# Patient Record
Sex: FEMALE | Race: BLACK OR AFRICAN AMERICAN | Employment: OTHER | URBAN - METROPOLITAN AREA
[De-identification: names, ages, dates, MRNs, and addresses within clinical notes are randomized per-mention and may not be internally consistent; named-entity substitution may affect disease eponyms.]

---

## 2017-01-13 NOTE — TELEPHONE ENCOUNTER
Forms received from [facility] for plan of care. Forms completed and faxed back. No further action needed.

## 2017-02-06 NOTE — TELEPHONE ENCOUNTER
Paulette from [address] [facility] pt is being discharged from [facility] this week as she is no longer home bound. Pt will need to have orders for outpatient Physical Therapy, pt had back surgery October 2016. Please advise.

## 2017-02-06 NOTE — TELEPHONE ENCOUNTER
Paulette from [facility] stts pt needs script to continue therapy. Pt would like script mailed to her home.  Please advise

## 2017-02-07 NOTE — TELEPHONE ENCOUNTER
Reviewed doctor's recommendations with Paulette from [address], she will contact the surgeon's office, no further questions at this time.

## 2017-02-16 NOTE — TELEPHONE ENCOUNTER
Rx request for Levocetirizine Dihydrochloride 5 mg, PASSED Allergy Protocol. Rx filled per protocol.      Refill Protocol Appointment Criteria  · Appointment scheduled in the past 12 months or in the next 3 months  Recent Visits       Provider Department Pr

## 2017-02-25 NOTE — TELEPHONE ENCOUNTER
Per Sartell, the Methocarbamol 750mg tab is not covered under pt's insurance. Pls call [phone] for a prior auth, pls let Sartell know when approved.

## 2017-03-01 NOTE — TELEPHONE ENCOUNTER
PA for Methocarbamol 750 mg tab completed with [employer] via CMM response time 3-5 business days [address] [facility]

## 2017-03-08 NOTE — PROGRESS NOTES
LUMBAR SPINE EVALUATION:   Referring Physician: Dr. Galdino Gay  Diagnosis: s/p lumbar fusion     Date of Service: 3/8/2017   Date of surgery: 10/20/16                       MD appt: 3/15/17    PATIENT SUMMARY   Mauricio Angeles is a 73year old y/o female lumbar ROM, decreased LE MMT, decreased left SLS. Pt currently has difficulty with bending, sitting, standing, sleeping, walking. Pt scored 59% limitation on FOTO.    Jayna Dia would benefit from skilled Physical Therapy to address the above impairments to im Therapeutic Exercises; Neuromuscular Re-education; Therapeutic Activity; Ultrasound;  Electrical Stim; Traction; Patient education: Home exercise program instruction;     Education or treatment limitation: None  Rehab Potential:good  FOTO: 41/100  Current s

## 2017-03-10 NOTE — PROGRESS NOTES
Diagnosis: s/p lumbar fusion L2-5  Authorized # of Visits:  2         Next MD visit: none scheduled  Fall Risk: standard         Precautions: n/a           Medication Changes since last visit?: No  5th visit FOTO:           D/C visit FOTO:   Subjective: Pt

## 2017-03-15 NOTE — PROGRESS NOTES
Diagnosis: s/p lumbar fusion L2-5  Authorized # of Visits: 3/8 (per POC)         Next MD visit: 3/31/17 Dr. Augie Cameron: standard         Precautions: n/a           Medication Changes since last visit?: No  5th visit FOTO:           D/C visit FOT

## 2017-03-17 NOTE — PROGRESS NOTES
Diagnosis: s/p lumbar fusion L2-5  DOS: 10/20/16  Authorized # of Visits: 4/8 (per POC)         Next MD visit: 3/31/17 Dr. Kory Soria: standard         Precautions: n/a           Medication Changes since last visit?: No  5th visit FOTO:

## 2017-03-21 NOTE — PROGRESS NOTES
Diagnosis: s/p lumbar fusion L2-5  DOS: 10/20/16  Authorized # of Visits: 5/8 (per POC)         Next MD visit: 3/31/17 Dr. Lorenzo Hall  Fall Risk: standard         Precautions: n/a           Medication Changes since last visit?: No  5th visit FOTO:  Status Treatment Time: 45 min  Goals       •  Therapy Goals          1. Pt will be I with HEP to improve therapy outcome.    2. Pt will be able to tolerate sitting >1 hour with LBP<2/10.   3. Pt will be able to tolerate sleeping > 4 hours without waking due to LBP

## 2017-03-23 NOTE — PROGRESS NOTES
Diagnosis: s/p lumbar fusion L2-5  DOS: 10/20/16  Authorized # of Visits: 6/8 (per POC)         Next MD visit: 3/31/17 Dr. Ines Workman  Fall Risk: standard         Precautions: n/a           Medication Changes since last visit?: No  5th visit FOTO:  Status sitting >1 hour with LBP<2/10. 3. Pt will be able to tolerate sleeping > 4 hours without waking due to LBP.    4.  Pt will be able to tolerate walking with no AD > 10 min with LBP<3/10.

## 2017-03-27 NOTE — PROGRESS NOTES
Diagnosis: s/p lumbar fusion L2-5  DOS: 10/20/16  Authorized # of Visits: 7/8 (per POC)         Next MD visit: 3/31/17 Dr. Kezia Tristan  Fall Risk: standard         Precautions: n/a           Medication Changes since last visit?: No  5th visit FOTO:  Status to improve therapy outcome.    2. Pt will be able to tolerate sitting >1 hour with LBP<2/10. 3. Pt will be able to tolerate sleeping > 4 hours without waking due to LBP.    4.  Pt will be able to tolerate walking with no AD > 10 min with LBP<3/10.

## 2017-03-29 NOTE — PROGRESS NOTES
Diagnosis: s/p lumbar fusion L2-5  DOS: 10/20/16  Authorized # of Visits: 8/8 (per POC)         Next MD visit: 3/31/17 Dr. Chano Bernstein  Fall Risk: standard         Precautions: n/a           Medication Changes since last visit?: No  5th visit FOTO:  Status shoulder flex, hooklying heel slides   3/15/17:  Added hooklying figure 4 stretch with gentle self OP     Assessment: Pt presents with limited lumbar ROM, improved LE strength, decreased balance on left leg. Pt has met 3/4 LTGs.  Pt will continue to benefi

## 2017-04-06 NOTE — PROGRESS NOTES
Diagnosis: s/p lumbar fusion L2-5  DOS: 10/20/16  Authorized # of Visits: 9/16 (per POC)         Next MD visit: 5/17/17 Dr. Soraya Richardson  Fall Risk: standard         Precautions: n/a           Medication Changes since last visit?: No  5th visit FOTO:  Status min  Goals       •  Therapy Goals          1. Pt will be I with HEP to improve therapy outcome.    2. Pt will be able to tolerate sitting >1 hour with LBP<2/10. 3. Pt will be able to tolerate sleeping > 4 hours without waking due to LBP.    4.  Pt will be

## 2017-04-11 NOTE — PROGRESS NOTES
Diagnosis: s/p lumbar fusion L2-5  DOS: 10/20/16  Authorized # of Visits: 10/16 (per POC)         Next MD visit: 5/17/17 Dr. Marcellus Szymanski: standard         Precautions: n/a           Medication Changes since last visit?: No  5th visit FOTO:  Statu and ROM, soft tissue mob for pain relief and tissue extensibility. Pt is able to demo proper squat lift. Initiated golfers lift techniques       Plan: progress as tolerated to improve functional mobility, progress standing exercises as able.   Begin body

## 2017-04-13 NOTE — PROGRESS NOTES
Diagnosis: s/p lumbar fusion L2-5  DOS: 10/20/16  Authorized # of Visits: 11/16 (per POC)         Next MD visit: 5/17/17 Dr. Ella Broussard: standard         Precautions: n/a           Medication Changes since last visit?: No  5th visit FOTO:  Statu Plan: progress as tolerated to improve functional mobility, progress standing exercises as able. Charges: TEx2, man mob 1       Total Timed Treatment: 44 min  Total Treatment Time: 45 min  Goals       •  Therapy Goals          1.  Pt will be I wi

## 2017-04-15 NOTE — TELEPHONE ENCOUNTER
Dr. Anitra Huerta pt requested order for mammogam. LOV w/you 12/5/2016 with no f/u appointments booked. Should pt make appointment with your before order? Order is pended, if you approve.

## 2017-04-18 NOTE — PROGRESS NOTES
Diagnosis: s/p lumbar fusion L2-5  DOS: 10/20/16  Authorized # of Visits: 12/16 (per POC)         Next MD visit: 5/17/17 Dr. Tesfaye Strickland: standard         Precautions: n/a           Medication Changes since last visit?: No  5th visit FOTO:  Statu mobility, progress standing exercises as able. Charges: TEx3      Total Timed Treatment: 44 min  Total Treatment Time: 45 min  Goals       •  Therapy Goals          1. Pt will be I with HEP to improve therapy outcome.    2.  Pt will be able to tolerate

## 2017-04-20 NOTE — PROGRESS NOTES
Diagnosis: s/p lumbar fusion L2-5  DOS: 10/20/16  Authorized # of Visits: 14/16 (per POC)         Next MD visit: 5/17/17 Dr. Cornelious Alpers: standard         Precautions: n/a           Medication Changes since last visit?: No  5th visit FOTO:  Statu paraspinals with pt in prone        HEP: 3/10/17-hooklying hip add squeeze, hooklying shoulder flex, hooklying heel slides   3/15/17:  Added hooklying figure 4 stretch with gentle self OP     Assessment: Pt reporting onset of LE tingling at night.   Nasra

## 2017-04-24 NOTE — TELEPHONE ENCOUNTER
Gynecology Medications; PLEASE ADVISE ON REFILLS. THANKS.   Protocol Criteria:  · Appointment scheduled in the past 12 months or the next 3 months  · Pap smear in the past 12 months  · Pap smear WNL manually verified  Recent Visits       Provider Department [facility], [facility]     In 4 days [facility], [address] [place] [address]     In 1 week [facility] [address]     In 1 week [place], [place] [address] [place]

## 2017-04-28 NOTE — PROGRESS NOTES
Diagnosis: s/p lumbar fusion L2-5  DOS: 10/20/16  Authorized # of Visits: 16/16 (per POC)         Next MD visit: 5/17/17 Dr. Perez Deaner: standard         Precautions: n/a           Medication Changes since last visit?: No  5th visit FOTO:  Statu PT to improve balance for gait for 3 scheduled visits     Charges: TEx1, NMREx2      Total Timed Treatment: 40 min  Total Treatment Time: 43 min

## 2017-05-01 NOTE — TELEPHONE ENCOUNTER
Pt verified she was able to  prescription. Pt asked if 30-day refills could be changed to 90-day refill. Noted pt has 3 refills remaining on Losartan-HCTZ, contacted pharmacy and changed to one 90-day refill.

## 2017-05-02 NOTE — PROGRESS NOTES
Diagnosis: s/p lumbar fusion L2-5  DOS: 10/20/16  Authorized # of Visits: 17/19 (per POC)         Next MD visit: 5/17/17 Dr. Kervin Herr: standard         Precautions: n/a           Medication Changes since last visit?: No  5th visit FOTO:  Statu Assessment: Pt with exacerbation of pain following ironing while seated. Instructed in body mechanics and corrected technique      Plan: progress as tolerated to improve functional mobility, progress standing exercises as able.       Charges: TEx3

## 2017-05-04 NOTE — PROGRESS NOTES
Diagnosis: s/p lumbar fusion L2-5  DOS: 10/20/16  Authorized # of Visits: 18/19 (per POC)         Next MD visit: 5/17/17 Dr. Carlos Kanner: standard         Precautions: n/a           Medication Changes since last visit?: No  5th visit FOTO:  Statu gentle self OP   5/4/17: Updated HEP    Assessment: Pt has good understanding of HEP and progression. Pt continues to have difficulty bridging and turning over in bed.       Plan: progress as tolerated to improve functional mobility, progress standing exer

## 2017-05-10 NOTE — PROGRESS NOTES
Diagnosis: s/p lumbar fusion L2-5  DOS: 10/20/16  Authorized # of Visits: 19/19 (per POC)         Next MD visit: 5/17/17 Dr. Nadia Swenson: standard         Precautions: n/a           Medication Changes since last visit?: No  5th visit FOTO:  Statu tolerate walking with no AD > 10 min with LBP<3/10.  GOAL ACHIEVED

## 2017-05-18 NOTE — PROGRESS NOTES
HPI:    Patient ID: Jermaine Perez is a 81year old female. Hypertension  This is a chronic problem. The current episode started more than 1 year ago. The problem is controlled.  Pertinent negatives include no chest pain, headaches, palpitations, periphe Tab Take 1 tablet by mouth once daily. Disp: 30 tablet Rfl: 3   DEBLITANE 0.35 MG Oral Tab TAKE ONE TABLET BY MOUTH EVERY DAY Disp: 84 tablet Rfl: 0   LANSOPRAZOLE 30 MG Oral Capsule Delayed Release TAKE 1 CAPSULE  BY MOUTH EVERY MORNING BEFORE BREAKFAST. Pulse: 71    Temp: 98.2 °F (36.8 °C)    TempSrc: Oral    Resp: 16    Height: 5' 6\" (1.676 m)    Weight: 176 lb (79.833 kg)          Body mass index is 28.42 kg/(m^2). PHYSICAL EXAM:   Physical Exam    Constitutional: No distress.    HENT:   Right Ea

## 2017-05-18 NOTE — PATIENT INSTRUCTIONS
Comply with medications. Pain management via prescription medications as needed. Recommend weight loss via daily exercising and consistent healthy dietary changes. Monitor blood pressures and record at home. Limit salt intake.   CBC order to reassess ane

## 2017-07-07 NOTE — TELEPHONE ENCOUNTER
May have been an old patient of mine at [facility], I cannot remember, was not there for very long, but she does not see me regularly, declined any prescriptions

## 2017-07-17 NOTE — PROGRESS NOTES
LOWER EXTREMITY EVALUATION:   Referring Physician: Dr. Laurent Jaime  Date of Onset: Feb. 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

## 2017-07-17 NOTE — PROGRESS NOTES
Patient is a 82-year-old female who presents today with left and right patella chondromalacia complaints left may be a little more symptomatic going up stairs can aggravate it.   She was treated some time ago she has not certain 1 with therapy and it did re arthritic change or femoral-tibial arthritis    Impression left and right knee patella chondromalacia    I discussed with her proper knee mechanics physical therapy was ordered if she can tolerate anti-inflammatories and approved through her primary care m

## 2017-07-20 NOTE — PROGRESS NOTES
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

## 2017-07-28 NOTE — TELEPHONE ENCOUNTER
Darryl Jorge needs a refill of:    •  AmLODIPine Besylate 5 MG Oral Tab, Take 1 tablet (5 mg total) by mouth daily. , Disp: 90 tablet, Rfl: 0

## 2017-07-31 NOTE — PROGRESS NOTES
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

## 2017-08-02 NOTE — PROGRESS NOTES
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 Discussed patient POC with the PT.     Charges: TX x 3       Total Timed Treatment: 45 min  Total Treatment Time: 47 min

## 2017-08-07 NOTE — PROGRESS NOTES
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 Discussed patient POC with the PT.     Charges: TX x 3       Total Timed Treatment: 45 min  Total Treatment Time: 47 min

## 2017-08-09 NOTE — PROGRESS NOTES
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 Progress Knee strength by one full grade so that patient may have sufficient strength to tolerate WB activities such as stairs without increased pain or deviation--IN PROGRESS  3.  Progress hip strength to 4+/5 or greater throughout so that pa

## 2017-08-14 NOTE — PROGRESS NOTES
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

## 2017-08-16 NOTE — PROGRESS NOTES
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 Thank you for your referral. If you have any questions, please contact me at Dept: [phone].     Sincerely,  Lorenzo Mention    Electronically signed

## 2017-08-16 NOTE — PROGRESS NOTES
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  All goals set at time of initial ev

## 2017-09-01 NOTE — TELEPHONE ENCOUNTER
LOV 5-18-17 refilled per protocol  Refill Protocol Appointment Criteria  · Appointment scheduled in the past 12 months or in the next 3 months  Recent Outpatient Visits            1 week ago S/P lumbar spinal fusion    [address] [place] [address]

## 2017-09-08 NOTE — TELEPHONE ENCOUNTER
Please advise on refill  Refill Protocol Appointment Criteria  · Appointment scheduled in the past 6 months or in the next 3 months  Recent Outpatient Visits            2 weeks ago S/P lumbar spinal fusion    [address]

## 2017-09-25 NOTE — TELEPHONE ENCOUNTER
Reason for Disposition  • Patient wants to be seen    Protocols used: FOOT PAIN-A-OH  Action Requested: Summary for Provider     []  Critical Lab, Recommendations Needed  [] Need Additional Advice  []   FYI    []   Need Orders  [] Need Medications Sent t

## 2017-10-02 NOTE — PATIENT INSTRUCTIONS
Continue with gabapentin. Consider follow up with neurosurgeon and an initial consult with neurology. EMG ordered.

## 2017-10-02 NOTE — PROGRESS NOTES
HPI:    Patient ID: Justen Steele is a 81year old female. Numbness   This is a chronic problem. The current episode started more than 1 year ago. The problem occurs intermittently. The problem has been waxing and waning.  Associated symptoms include nu mg total) rectally daily as needed. Disp: 10 suppository Rfl: 0   docusate sodium 100 MG Oral Cap Take 100 mg by mouth 2 (two) times daily. Disp: 10 capsule Rfl: 0   simvastatin 20 MG Oral Tab Take 20 mg by mouth nightly.  Disp:  Rfl:    Diclofenac Sodium (

## 2017-10-20 NOTE — PROCEDURES
[address]  Phone: [phone]  Fax: [phone]    ELECTRODIAGNOSTIC REPORT          Patient:  Prashant Lyles YOB: 1945  Patient ID: [id number] Hand Dominance: Sarah Conclusion: She relates bilateral toes feet anterior tibial uncomfortable sensation which predominant occurs at night. If she stands up symptoms resolved. He has had lumbar disc surgery fusion.     The [facility] conduction study reveals bilateral mild chr L.  GLUTEUS MED N None None None None 3-5 N N N   L. TIB ANTERIOR N None None None None 5-7 N N N   L. GASTROCN (MED) N None None None None N N N N   L. VAST LATERALIS N None None None None N N N N   L. TIB POSTERIOR N None None None None 3-5 N N N   TIB AN

## 2017-11-24 NOTE — TELEPHONE ENCOUNTER
Chart reviewed. Refills sent per Triage Dept protocol.      Refill Protocol Appointment Criteria  · Appointment scheduled in the past 6 months or in the next 3 months  Recent Outpatient Visits            1 month ago Lumbar radiculopathy    [facility]

## 2017-11-30 NOTE — TELEPHONE ENCOUNTER
Pt called in requesting a refill on medication below, please. Pt states she only has a few days left.     Current Outpatient Prescriptions:     •  LOSARTAN POTASSIUM-HCTZ 50-12.5 MG Oral Tab, TAKE ONE TABLET BY MOUTH ONE TIME DAILY , Disp: 90 tablet, Rfl:

## 2017-12-01 NOTE — TELEPHONE ENCOUNTER
Requesting Losartan-HCTZ refill    Failed IM/FM refill protocol, please advise    Hypertensive Medications  Protocol Criteria:  · Appointment scheduled in the past 6 months or in the next 3 months  · BMP or CMP in the past 12 months  · Creatinine result <

## 2017-12-22 NOTE — TELEPHONE ENCOUNTER
Pt calling to request refill request for GABAPENTIN 300 MG Oral Cap. .. please advise       Current Outpatient Prescriptions:  GABAPENTIN 300 MG Oral Cap TAKE ONE CAPSULE BY MOUTH TWICE DAILY  Disp: 60 capsule Rfl: 0

## 2017-12-22 NOTE — TELEPHONE ENCOUNTER
Please advise on behalf of Dr. Geoffrey Malik, pt is almost out of medication. She takes it daily since her surgery in October 2016. Pt called because she got a message that her medication was \"denied\". No documentation noted. Please advise.

## 2017-12-22 NOTE — TELEPHONE ENCOUNTER
Refill Protocol Appointment Criteria: Medication filled for 90 days per protocol.     · Appointment scheduled in the past 6 months or in the next 3 months  Recent Outpatient Visits            2 weeks ago S/P lumbar fusion    [address]

## 2018-02-14 NOTE — IMAGING NOTE
Pt returned to holding area 1253pm cookies and juice provided. Pt hob at 30 degrees denies headache .  Friend at pt side     1345 post instructions given verbal et written pt instructed complete bed rest with lying at 30 degree angle only activity to bathro

## 2018-02-22 NOTE — TELEPHONE ENCOUNTER
Patient dropped off disability parking placard to be completed and mailed to the [place], [place]. Form placed in [address]. Please contact pt once form has been mailed.

## 2018-02-28 NOTE — TELEPHONE ENCOUNTER
Pt calling regarding form. Pt is asking for a copy of the form for her records mailed to her home also. .please advise

## 2018-03-24 NOTE — TELEPHONE ENCOUNTER
Refill Protocol Appointment Criteria  · Appointment scheduled in the past 6 months or in the next 3 months  Recent Outpatient Visits            1 month ago S/P lumbar laminectomy    Rosy Renteria MD    Office Visi

## 2018-03-26 NOTE — TELEPHONE ENCOUNTER
Kalia Kc stopped in the [facility] office. She received notice from her insurance that the Methocarbamol is not on her list of covered drugs or is included on the drug list but subject to certain limits.  Does Dr. Nadiya Bennett want to send in a prior auth or does h

## 2018-03-26 NOTE — TELEPHONE ENCOUNTER
Maco Salazar is due for her annual mammogram next month. Can an order pls be entered? Yolis Rich know when done.

## 2018-03-26 NOTE — TELEPHONE ENCOUNTER
Patient informed that a prior authorization can be initiated per Dr. Burke Ignacio for Methocarbamol.    Patient states the methocarbamol was filled on 03/19/18 and she will contact pharmacy to have them send over prior authorization information one week before

## 2018-04-13 NOTE — TELEPHONE ENCOUNTER
Signed Prescriptions Disp Refills    lansoprazole 30 MG Oral Capsule Delayed Release 30 capsule 2      Sig: TAKE ONE CAPSULE BY MOUTH IN THE MORNING BEFORE BREAKFAST        Authorizing Provider: Sonya Velasquez        Ordering User: Sofi Garibay

## 2018-04-17 NOTE — PATIENT INSTRUCTIONS
Medication reviewed and renewed where needed and appropriate. Comply with medications. Monitor blood pressures and record at home. Limit salt intake. Recommend weight loss via daily exercising and consistent healthy dietary changes. Hep C screen done.

## 2018-04-17 NOTE — PROGRESS NOTES
HPI:    Patient ID: Rosa M Villegas is a 81year old female. Needs shingrix as well as Hep C screen. Hypertension   This is a chronic problem. The current episode started more than 1 year ago. The problem is unchanged. The problem is controlled.  Per FLUTICASONE PROPIONATE 50 MCG/ACT Nasal Suspension spray 2 sprays in each nostril daily Disp: 16 g Rfl: 1   LOSARTAN POTASSIUM-HCTZ 50-12.5 MG Oral Tab TAKE ONE TABLET BY MOUTH ONE TIME DAILY  Disp: 90 tablet Rfl: 0   Clindamycin Phosphate 1 % External L present. Pulmonary/Chest: Effort normal and breath sounds normal. No respiratory distress. Neurological: She is alert and oriented to person, place, and time. ASSESSMENT/PLAN:   1.  Essential hypertension  To goal  - AmLODIPine Besylate 5 MG

## 2018-04-30 NOTE — TELEPHONE ENCOUNTER
Pt called in requesting to file an appeal for the following medication to be approved:      Current Outpatient Prescriptions:     •  METHOCARBAMOL 750 MG Oral Tab, TAKE ONE TABLET BY MOUTH TWICE DAILY , Disp: 60 tablet, Rfl: 1

## 2018-05-03 NOTE — TELEPHONE ENCOUNTER
PA for Methocarbamol 750 mg tab completed with [employer] via CMM response time 24-72 hours KEY [id number].

## 2018-05-23 NOTE — LETTER
[address]   Date:   6/24/2019     Name:   Justen Steele    YOB: 1945   MRN:   [id number]       WHERE IS YOUR PAIN NOW? Ernie the areas on your body where you feel the described sensations.   Use the appropriate sym
no

## 2018-06-06 NOTE — TELEPHONE ENCOUNTER
Failed per nursing protocol - please advise on refill request       Hypertensive Medications  Protocol Criteria:  · Appointment scheduled in the past 6 months or in the next 3 months  · BMP or CMP in the past 12 months  · Creatinine result < 2  Recent Out

## 2018-07-17 NOTE — TELEPHONE ENCOUNTER
Refill Protocol Appointment Criteria  · Appointment scheduled in the past 6 months or in the next 3 months  Recent Outpatient Visits            3 months ago Need for hepatitis C screening test    [facility] [place], [facility], [address], [address] Frieda Plummer,

## 2018-08-25 NOTE — TELEPHONE ENCOUNTER
Failed per nursing protocol - please advise on refill request       Gynecology Medications  Protocol Criteria:  · Appointment scheduled in the past 12 months or the next 3 months  · Pap smear in the past 12 months  · Pap smear WNL manually verified  Recen

## 2018-09-04 NOTE — TELEPHONE ENCOUNTER
Hypertensive Medications  Protocol Criteria:  · Appointment scheduled in the past 6 months or in the next 3 months  · BMP or CMP in the past 12 months  · Creatinine result < 2  Recent Outpatient Visits            2 weeks ago S/P lumbar spinal fusion    D Sig: TAKE ONE TABLET BY MOUTH ONE TIME DAILY            LOV 4-17-18  LR 6-6-18 # 90    Unable to refill per protocol. pls advise, thanks.

## 2018-09-07 NOTE — PATIENT INSTRUCTIONS
Take the gabapentin 300mg capsules 3 times per day. When you run out you may request that either I or Dr. Daxa Malik refill the medication.  In either case let the provider know that the dose has been changed to three times per day so we can give you the appr office 48-72 hours to fill the prescription as our physicians rotate between multiple offices and procedure days in the hospitals. · Patient must present photo ID at time of .  If a designated family member will be picking up prescription, office mu

## 2018-09-07 NOTE — H&P
[facility] [address], [address], [address], [facility]    History and Physical    La Verkin Friend Patient Status:  No patient class for patient encounter    1945 MRN [id number]   [facility], symptoms. Alleviating factors: medications have been helping   Incontinence:  Denies b/b incontinence     Previous diagnostic tests: X-ray lumbar spine 8/17/18  Previous treatments:  Medications: Ibuprofen, methocarbamol. L2-L5 lumbar fusion in 2016.   C Musculoskeletal: Positive for back pain and gait problem. Skin: Negative for color change and rash. Neurological: Positive for numbness. Negative for weakness. Physical Exam:   Vital Signs:  Blood pressure 112/74, pulse 72, resp.  rate 16, heigh to 300mg TID. Methocarbamol refilled. Follow up in 6 weeks. If pain persists may need to consider caudal JACQUELINE.     Jessie Barrientos,   9/7/2018

## 2018-09-10 NOTE — TELEPHONE ENCOUNTER
Refill Protocol Appointment Criteria  · Appointment scheduled in the past 12 months or in the next 3 months  Recent Outpatient Visits            3 days ago Lumbar radiculopathy    LAILA Disla, [address], [address], [place]

## 2018-09-21 NOTE — PROGRESS NOTES
LUMBAR SPINE EVALUATION:   Referring Physician: Dr. Karen Drummond  Date of Onset: May 2018 Date of Service: 9/21/2018   Diagnosis: L5 radiculopathy  PATIENT SUMMARY:   Sabas Jack is a 72year old y/o female who presents to therapy today with complaints of syndrome L/S L, decreased lumbopelvic rotation.    ROM:     Lumbar         Pain (+/-)   Flexion WNL tips to ankles L/S flat Pain-free   Extension Limited 25%  Pain-free   R Sidebend WNL    L Sidebend Limited 25% L SIJ pain   R Rotation WNL    L Rotation Gamez improvement. Frequency / Duration: Patient will be seen for 2x/week or a total of 12 visits over a 90 day period.   Treatment will include: Manual Therapy, Neuromuscular Re-education, Therapeutic Activities, Therapeutic Exercise and Home Exercise Progra

## 2018-09-24 NOTE — PROGRESS NOTES
Diagnosis: L5 radiculopathy  Authorized # of Visits:  12         Next MD visit: none scheduled  Fall Risk: standard         Precautions: n/a           Medication Changes since last visit?: No  Subjective: Reports she felt great after last session and only mobility and continue core stabilization to improve body mechanics.      Charges: MT 1, EX 2       Total Timed Treatment: MT 15 min, EX 25 min  Total Treatment Time: 43 min

## 2018-09-25 PROBLEM — E78.00 HIGH CHOLESTEROL: Status: ACTIVE | Noted: 2018-09-25

## 2018-09-25 PROBLEM — E66.9 OBESITY (BMI 30-39.9): Status: ACTIVE | Noted: 2018-09-25

## 2018-09-25 PROBLEM — M19.90 OSTEOARTHRITIS: Status: ACTIVE | Noted: 2018-09-25

## 2018-09-25 NOTE — PROGRESS NOTES
[facility] Consultation Note       Date of Consult:  2018    Patient:  Vj Muir  :      1945  MRN:      [id number]    Referring Provider: Dr. Mike Aguilera       Chief Complaint:  Patient presents with:  Consult: non surgi TAKE ONE TABLET BY MOUTH ONE TIME DAILY  Disp: 90 tablet Rfl: 0   SHAROBEL 0.35 MG Oral Tab TAKE ONE TABLET BY MOUTH ONE TIME DAILY  Disp: 84 tablet Rfl: 0   ibuprofen 200 MG Oral Tab Take 200 mg by mouth every 6 (six) hours as needed for Pain.  Disp:  Rfl: - non-medical: Not on file    Occupational History      Occupation:         Comment: retired    Tobacco Use      Smoking status: Former Smoker        Packs/day: 0.00        Years: 15.00        Pack years: 0        Start date: 12/26/2017      Smoke oats, 2 % milk, one packet of oatmeal, sausage, eggs, toast. Black tea Fruit, yogurt, candy Skips, yogurt, fruit, chips,  Ice cream sandwich, popcorn Sausages, veggies, roll   +sweet tooth  Popcorn    Soda Drinker?: No  If yes, how much?:      Number of re and has remained in relatively good control. HYPERCHOLESTEROLEMIA:  The patient states that her cholesterol has been well controlled on her current medication.     Lab Results   Component Value Date/Time    CHOLEST 198 06/02/2016 12:30 PM     (H)

## 2018-09-28 NOTE — PROGRESS NOTES
Diagnosis: L5 radiculopathy  Authorized # of Visits:  12         Next MD visit: none scheduled  Fall Risk: standard         Precautions: n/a           Medication Changes since last visit?: No  Subjective: 1/10 LBP since last sessions, but feels a little st able to tolerate up to 1 hr standing activities with max 3/10 pain to be able to perform chores at home. (PROGRESSING)  Pt will be able to improve FOTO score by 10 pts to demonstrate functional improvement.      Plan: Continue manual therapy to restore SI m

## 2018-10-01 NOTE — PROGRESS NOTES
Diagnosis: L5 radiculopathy  Authorized # of Visits:  12         Next MD visit: none scheduled  Fall Risk: standard         Precautions: n/a           Medication Changes since last visit?: No  Subjective: Reports LLE pain-free since last session and is wal prone hip ext bilat      Assessment: No longer experiencing adverse neural tension of L sciatic N. Less stiffness into L hip extension with improved glut max activation. TA and multifidi strength still lacking making it difficult to control trunk mobility.

## 2018-10-05 NOTE — PROGRESS NOTES
Diagnosis: L5 radiculopathy  Authorized # of Visits:  12         Next MD visit: none scheduled  Fall Risk: standard         Precautions: n/a           Medication Changes since last visit?: No  Subjective: Reports she has been relatively pain-free since las hold 5 sec with PPT  -Standing hip flexor stretch L stance x2 min   -prone hip ext EOB 2x10 hold 3 sec  -Standing hip abd YTB 3x10 bilat  -Frog position PPT x10 hold 10 sec  -TG DL squat L6 hip dissociation 6 min.  -Sweeping and vacuuming mechanics instruc

## 2018-10-10 NOTE — TELEPHONE ENCOUNTER
Medication request: Methocarbamol. 750mg. Take 1 tablet BID. #60.  No refills    LOV-9/7/2018  NOV-10/19/2018    /Last refill:9/7/2018

## 2018-10-11 NOTE — TELEPHONE ENCOUNTER
Please call patient and verify the frequency of the medication, per latest medication recor (listed as historical) patient is taking it three times a day, previous record that was discontinued was twice a day, once verified, needs to route it to the [facility]

## 2018-10-11 NOTE — TELEPHONE ENCOUNTER
Please call patient and verify the frequency of the medication, per latest medication record, listed as historical, patient is taking it three times a day, previous record that was discontinued was twice a day, once verified, needs to route it to the PCP f

## 2018-10-12 NOTE — PROGRESS NOTES
Diagnosis: L5 radiculopathy  Authorized # of Visits:  12         Next MD visit: none scheduled  Fall Risk: standard         Precautions: n/a           Medication Changes since last visit?: No  Subjective: Tried sweeping at home and was able adjust and avoi hip ext EOB 2x10 hold 3 sec EX  -quadruped rocking x20  -TG DL squat L6 hip dissociation 6 min.  -Standing hip flexor stretch L stance x2 min   -prone hip ext EOB 2x10 hold 3 sec  -S/L clams with TA brace bilat hold 5 sec x10  -Frog position PPT x20 hold 5 along TL junction)  Pt will be able to improve FOTO score by 10 pts to demonstrate functional improvement.      Plan: Updated HEP with progression towards discharge next week secondary to progress made  Charges: MT 1, EX 2       Total Timed Treatment: MT 15

## 2018-10-13 NOTE — TELEPHONE ENCOUNTER
Pt states Dr. Momo aSwyer told her he would take over ordering this, she asked pharmacy to send rx to him, she prefers he orders it. She is taking it three times a day. She will reach out to pharmacy and Dr. Momo Sawyer.   Message sent to pharmacy with denial.

## 2018-10-15 NOTE — PROGRESS NOTES
Diagnosis: L5 radiculopathy  Authorized # of Visits:  12         Next MD visit: none scheduled  Fall Risk: standard         Precautions: n/a           Medication Changes since last visit?: No  Subjective: Did a couple loads of laundry over the weekend and min.  -Standing hip flexor stretch L stance.    -EOB hip flexor stretching x2 min bilat  -prone hip ext EOB 2x10 hold 3 sec EX  -quadruped rocking x20  -TG DL squat L6 hip dissociation 6 min.  -Standing hip flexor stretch L stance x2 min   -prone hip ext EO any adverse effects. Plan on progression of HEP in 2 visits followed by discharge secondary to progress made.      Goals: 6 wks  Pt will be able to walk up to 30 min max 3/10 pain to be able to go grocery shopping. (MET)  Be able to sleep through the night

## 2018-10-16 NOTE — TELEPHONE ENCOUNTER
Please review; protocol failed.   Hypertensive Medications  Protocol Criteria:  · Appointment scheduled in the past 6 months or in the next 3 months  · BMP or CMP in the past 12 months  · Creatinine result < 2  Recent Outpatient Visits            Yesterday

## 2018-10-19 NOTE — PROGRESS NOTES
HPI:    Patient ID: Isai Shore is a 72year old female. 72year old female with h/o L2-5 fusion with left radicular lower back pain presents for follow up. Has been participating in physical therapy. Reports 75% relief of radicular symptoms.   Takes g Rfl: 0   Clindamycin Phosphate 1 % External Lotion Apply 1 Application topically 2 (two) times daily. Disp: 60 mL Rfl: 11   ASPIRIN EC LOW DOSE 81 MG Oral Tab EC TAKE 1 TABLET BY MOUTH DAILY.  Disp: 30 tablet Rfl: 3   bisacodyl 10 MG Rectal Suppos Place 1 needed. She may see me on an as-needed basis.     Carson Vogel DO  Physical Medicine and [address] [place] [address]

## 2018-10-19 NOTE — PATIENT INSTRUCTIONS
If the pain stays in the same area and worsens after you have completed physical therapy you may give my office a phone call and I can certainly prescribe you some more therapy.  If the symptoms are in a different area or are different in a manner that is c

## 2018-10-25 NOTE — PROGRESS NOTES
Diagnosis: L5 radiculopathy  Authorized # of Visits:  12         Next MD visit: none scheduled  Fall Risk: standard         Precautions: n/a           Medication Changes since last visit?: No  Subjective: Reports she is almost 100% better and has been comp squeezes x15 hold 5 sec  -S/L clams with TA brace bilat hold 5 sec x15 EX  -TG DL squat L6 hip dissociation 6 min.  -Standing hip flexor stretch L stance.    -EOB hip flexor stretching x2 min bilat  -prone hip ext EOB 2x10 hold 3 sec EX  -quadruped rocking piriformis stretching 3x30 sec bilat  -BKFO with PPT x5 bilat alternating (4 min)  -STS Training 3x5 with 2# MB reach  -Standing hip abd YTB 3x10 bilat  -Standing hip ext YTB 3x10 bilat   -Multifidi Reaches RTB 3x10 bilat    HEP - EOB hip flexor stretching

## 2018-11-13 RX ORDER — METHOCARBAMOL 750 MG/1
TABLET, FILM COATED ORAL
Qty: 60 TABLET | Refills: 0 | Status: SHIPPED | OUTPATIENT
Start: 2018-11-13 | End: 2018-12-18

## 2018-11-13 NOTE — TELEPHONE ENCOUNTER
Refill request for methocarbamol 750 mg, BID, #60, no refills    LOV: 10/19/18  NOV: none  Last refilled on 10/10/18

## 2018-11-13 NOTE — TELEPHONE ENCOUNTER
Pharmacy sent us a refill order for pt Methocarbamol    I reviewed last progress note from Dr Russell Azar    I called pt to confirm she is still taking the Methocarbamol 750 mg Takes one twice daily.  Pt confirmed still taking this Rx  I will refill this Rx one time only and route this note to Dr Russell Azar as Cornelio Colbert

## 2018-11-19 NOTE — TELEPHONE ENCOUNTER
Refill request for gabapentin 300 mg, TID, #90, no refills    LOV: 10/19/18  NOV: none  Last refilled on 10/16/18

## 2018-11-21 NOTE — TELEPHONE ENCOUNTER
Received [facility] Rx Health Guide for prescribing narcotic and methocarbamal and nortriptyline  Left on Dr Konrad echeverria for review and routed    No response needed.

## 2018-11-29 NOTE — TELEPHONE ENCOUNTER
RX declined as patient does not seen any pcp at our office. Note sent to pharmacy to contact patients pcp office.

## 2018-12-03 NOTE — TELEPHONE ENCOUNTER
Hypertensive Medications  Protocol Criteria:  · Appointment scheduled in the past 6 months or in the next 3 months  · BMP or CMP in the past 12 months  · Creatinine result < 2  Recent Outpatient Visits            1 month ago     [address] advise. Thank you.         Hypertensive Medications Protocol [id number] 8:21 AM   CMP or BMP in past 12 months    Appointment in past 6 or next 3 months

## 2018-12-03 NOTE — TELEPHONE ENCOUNTER
Refill passed per Oralee Snantonietta protocol.     Refill Protocol Appointment Criteria  · Appointment scheduled in the past 6 months or in the next 3 months  Recent Outpatient Visits            1 month ago     [id number] [facility],

## 2018-12-05 NOTE — TELEPHONE ENCOUNTER
Pt is calling because she was told by Dorian Ospina that the pneumonia vaccine comes in two series. Pt would like to know if she has had both injections.  Pt states that she seeing a different provider at the time but had all of her records sent to Dr. Ashley Dash

## 2018-12-06 RX ORDER — LANSOPRAZOLE 30 MG/1
CAPSULE, DELAYED RELEASE ORAL
Qty: 90 CAPSULE | Refills: 0 | Status: SHIPPED | OUTPATIENT
Start: 2018-12-06 | End: 2019-04-01

## 2018-12-10 NOTE — PROGRESS NOTES
[address], [address]  Dept: [phone]       Patient:  Kylah Joe  :      1945  MRN:      [id number]    Chief Complaint:  Patient presents wit Oral Tab TAKE ONE TABLET BY MOUTH ONE TIME DAILY  Disp: 90 tablet Rfl: 0   SHAROBEL 0.35 MG Oral Tab TAKE ONE TABLET BY MOUTH ONE TIME DAILY  Disp: 84 tablet Rfl: 0   ibuprofen 200 MG Oral Tab Take 200 mg by mouth every 6 (six) hours as needed for Pain.  Sean Flower Tobacco comment: starting to quit. 4/22/17 is the quiting date    Substance and Sexual Activity      Alcohol use:  Yes        Alcohol/week: 0.0 oz        Comment: occasionally      Drug use: No      Sexual activity: Not on file    Other Topics      Cathleen 20  · # of snacks per day: 1 Type of snacks:  fruit  · Amount of soda consumption per day:    · Amount of water (in ounces) per day:  64  · Drinking between meals only:  yes  · Toughest challenge:  exercise    Nutritional Goals  Limit carbohydrates to 100 30-39. 9)    PLAN     Patient is not interested in bariatric surgery. Patient desires to pursue traditional weight loss at this time. HYPERTENSION: Blood pressure stable on the above medications. No interval change in antihypertensive medication.      D

## 2018-12-12 NOTE — TELEPHONE ENCOUNTER
[facility], scanned documents in media, and care everywhere. Do not see immunization records from another facility. Called the patient to see where she had the pneumonia vaccines.      Patient cannot recall which provider she was

## 2018-12-14 RX ORDER — METHOCARBAMOL 750 MG/1
TABLET, FILM COATED ORAL
Qty: 60 TABLET | Refills: 0 | Status: CANCELLED | OUTPATIENT
Start: 2018-12-14

## 2018-12-17 NOTE — TELEPHONE ENCOUNTER
Refill request for methocarbamol 175 mg, BID, #60, no refills    LOV: 10/19/18  NOV: none  Last refilled on 11/13/18

## 2018-12-18 NOTE — TELEPHONE ENCOUNTER
OK to refill this time, but if she continues to require these medications I'd like to see her in a month or two for follow up.

## 2018-12-18 NOTE — TELEPHONE ENCOUNTER
Medication request: Gabapentin 300 mg Oral Cap, #90, no refill  Take one capsule by mouth three times daily. [place]: 11/19/18    Medication request: Methocarbamol 750 MG Oral Tab, #60, no refill  Take one tablet by mouth twice daily.      ILP

## 2018-12-18 NOTE — TELEPHONE ENCOUNTER
Spoke with pt and informed her that Dr. Verna Maria has filled the medications requested this time, but if she continues to need the medications for pain management, she needs to schedule an appointment in the next month or two for a follow up.  Pt verbalized u

## 2019-01-22 NOTE — TELEPHONE ENCOUNTER
Please review; protocol failed.     Hypertensive Medications  Protocol Criteria:  · Appointment scheduled in the past 6 months or in the next 3 months  · BMP or CMP in the past 12 months  · Creatinine result < 2  Recent Outpatient Visits            1 month

## 2019-02-06 NOTE — TELEPHONE ENCOUNTER
Medication request: Methocarbamol 750mg 1 Tab PO BID    [place] 12/18/18 #60 r-0    Medication request: Gabapentin 300mg 1 Cap PO TID    [place]: 12/18/18 #90 r-0    LOV: 10/19/18  No follow up scheduled

## 2019-03-04 PROBLEM — R63.5 WEIGHT GAIN: Status: ACTIVE | Noted: 2019-03-04

## 2019-03-04 PROBLEM — R63.2 INCREASED APPETITE: Status: ACTIVE | Noted: 2019-03-04

## 2019-03-04 NOTE — PROGRESS NOTES
[address], [address]  Dept: [phone]       Patient:  Vj Friend  :      1945  MRN:      [id number]    Chief Complaint:  Patient presents wit IN THE MORNING BEFOR BREAKFAST Disp: 30 capsule Rfl: 0   lansoprazole 30 MG Oral Capsule Delayed Release TAKE ONE CAPSULE BY MOUTH IN THE MORNING BEFOR BREAKFAST Disp: 90 capsule Rfl: 0   LOSARTAN POTASSIUM-HCTZ 50-12.5 MG Oral Tab TAKE ONE TABLET BY MOUTH Years: 15.00        Pack years: 0        Start date: 12/26/2017      Smokeless tobacco: Never Used      Tobacco comment: starting to quit. 4/22/17 is the quiting date    Substance and Sexual Activity      Alcohol use:  Yes        Alcohol/week: 0.0 oz microdiscectomy   • TOTAL ABDOMINAL HYSTERECTOMY  1992    w/ BSO   • TUBAL LIGATION       Family History:    Family History   Problem Relation Age of Onset   • Diabetes Father    • Hypertension Mother    • Lipids Mother    • Heart Disorder Mother    • Canc appearance: alert, cooperative and mildly obese  Head: Normocephalic, without obvious abnormality, atraumatic  Eyes: conjunctivae/corneas clear. PERRL, EOM's intact. Fundi benign.   Lungs: clear to auscultation bilaterally  Heart: S1, S2 normal, no murmur, VITAMIN B12; Future  -     VITAMIN D, 25-HYDROXY; Future  -     HEMOGLOBIN A1C; Future  -     LEPTIN, SERUM; Future  -     EKG 12-LEAD;  Future    Obesity (BMI 30-39.9)  -     VITAMIN B12; Future  -     VITAMIN D, 25-HYDROXY; Future  -     HEMOGLOBIN A1C; F

## 2019-03-04 NOTE — PATIENT INSTRUCTIONS
I or my staff will contact you with results of the Xray. Start the steroid pack for pain. If the pain persists may consider physical therapy, either land-based or in the pool.

## 2019-03-04 NOTE — TELEPHONE ENCOUNTER
Pt req an order for needed labs before her Annual wellness check on 5/13 and Pt is also req an order for a mammogram, please call when order has been submitted.

## 2019-03-04 NOTE — PROGRESS NOTES
HPI:    Patient ID: Idalia Taylor is a 72year old female. She has a history of lumbosacral fusion, presents today with new onset right lateral hip and groin pain that started approximately 2 weeks ago with no inciting event.   Symptoms are described as Fluticasone Propionate 50 MCG/ACT Nasal Suspension spray 2 sprays in each nostril daily Disp: 16 g Rfl: 2   ibuprofen 200 MG Oral Tab Take 200 mg by mouth every 6 (six) hours as needed for Pain.  Disp:  Rfl:    acyclovir 5 % External Ointment Apply 1 g to from intra-articular hip pathology, trochanteric bursitis or both. Recommend medrol dosepak, XR of the right hip. If symptoms persist consider PT either land-based or aquatic therapy.     Kelly Espinosa DO  Physical Medicine and Rehabilitation  [facility]

## 2019-03-05 NOTE — TELEPHONE ENCOUNTER
Pt informed of order on chart. Agrees to make a appointment for next month. No further action needed.

## 2019-03-05 NOTE — TELEPHONE ENCOUNTER
Please advise on refill request.     Hypertensive Medications  Protocol Criteria:  · Appointment scheduled in the past 6 months or in the next 3 months  · BMP or CMP in the past 12 months  · Creatinine result < 2  Recent Outpatient Visits            Kayley Lobo

## 2019-03-06 NOTE — TELEPHONE ENCOUNTER
DO CAMILA Batista Nurse             Please let the patient know there is very little arthritis in the hip. She should continue with the medrol dosepak, call the clinic or send a message through [address] with a status update next week.

## 2019-03-11 NOTE — TELEPHONE ENCOUNTER
[facility] [employer] sent form regarding elderly patient taking Methocarbamol. Form put in Dr Jada Higgins for review.

## 2019-03-12 NOTE — TELEPHONE ENCOUNTER
Requested Prescriptions     Refused Prescriptions Disp Refills   • SHAROBEL 0.35 MG Oral Tab [Pharmacy Med Name: Sharobel 0.35 Mg Tab Nort] 84 tablet 0     Sig: TAKE ONE TABLET BY MOUTH ONE TIME DAILY     Refused By: Kaci Martinez     Reason for R

## 2019-03-13 NOTE — TELEPHONE ENCOUNTER
Pt. informed insurance was verified and MRI right hip wo cpt [id number] is a covered benefit and does not require authorization. She will call in the am to schedule appt.

## 2019-03-13 NOTE — TELEPHONE ENCOUNTER
Spoke with pt still with right groin pain 6/10, Medrol dose pack completed this Monday it help some but the pain still there, walking and moving pain is worsen. Takes Advil but doesn't help. Please advice?

## 2019-03-14 NOTE — TELEPHONE ENCOUNTER
Refill request for gabapentin 300 mg, TID, #90, no refills    LOV: 3/4/19  NOV: none  Last refilled on 2/6/19

## 2019-03-22 NOTE — TELEPHONE ENCOUNTER
----- Message from Miles Steen DO sent at 3/22/2019  9:02 AM CDT -----  Please let the patient know there is a small tear of one of the tendons of the hip, and a mild amount of arthritis seen on the MRI of the hip.  She should follow up with me about nikko

## 2019-03-22 NOTE — TELEPHONE ENCOUNTER
S/w patient and relayed the results of her Right hip MRI that was completed 3/21/19. Pt was informed that she has a small tear in one of the tendons and that there is a mild amount of arthritis.  Pt was instructed to begin PT (starting 4/1/19) and f/u half

## 2019-04-01 NOTE — PROGRESS NOTES
LUMBAR SPINE EVALUATION:   Referring Physician: Dr. Karen Drummond  Date of Onset: Mid Feb 2019 Date of Service: 4/1/2019   Diagnosis: Right groin pain (R10.31)  Spinal stenosis of lumbar region with neurogenic claudication (I99.560)  PATIENT SUMMARY:   Hiram Cabezas Total abdominal hysterectomy  1992    w/ BSO   • Tubal ligation     L2-5 Fusion    Medications reviewed in chart with pt.         ASSESSMENT:   Pt presents with R SIJ dysfunction with underlying extension rotation syndrome of lumbar spine secondary to core instructed in and issued a HEP for quadruped rocking for hip mobility, BKFO with TA bracing, SLS hip isometrics 4x30 sec bilat  Manual therapy: Long axis traction SIJ manip R, R femoral long axis traction manipulation, R L5-S1 gapping SB technique  Tx resp furnished under this plan of treatment and while under my care.     X______________________________________________ Date________________  Certification From: 2/7/2428      To: 6/30/2019

## 2019-04-10 NOTE — TELEPHONE ENCOUNTER
Gabapentin 300mg. Take 1 capsule TID. #90. No refills.     Last filled-3/15/2019    LOV-3/4/2019  NOV-4/22/2019

## 2019-04-11 NOTE — PROGRESS NOTES
Diagnosis: Right groin pain (R10.31)  Spinal stenosis of lumbar region with neurogenic claudication (S51.303)  Authorized # of Visits:  12 (MEDICARE PART A&B)         Next MD visit: none scheduled  Fall Risk: standard         Precautions: L2-5 fusion  Medi

## 2019-04-15 NOTE — PROGRESS NOTES
Diagnosis: Right groin pain (R10.31)  Spinal stenosis of lumbar region with neurogenic claudication (M78.294)  Authorized # of Visits:  12 (MEDICARE PART A&B)         Next MD visit: none scheduled  Fall Risk: standard         Precautions: L2-5 fusion  Medi pain  Improve FOTO scores by 10 pts to demonstrate functional improvement. Plan: Continue manual therapy to restore hip and lumbo sacral mobility and progress core and proximal hip stabilization exercises to improve segmental control.  ADD PRAVEENA Fleming

## 2019-04-17 NOTE — TELEPHONE ENCOUNTER
Medication request: methocarbamol 750mg 1 TAB BID    LOV: 03/04/19  NOV: 04/22/19    [place] 02[place]06/19 #60 r-0

## 2019-04-18 NOTE — PROGRESS NOTES
Diagnosis: Right groin pain (R10.31)  Spinal stenosis of lumbar region with neurogenic claudication (M80.383)  Authorized # of Visits:  12 (MEDICARE PART A&B)         Next MD visit: none scheduled  Fall Risk: standard         Precautions: L2-5 fusion  Medi Goals:  6 wks  Pain-free bed mobility with ease. (MET)  Pain-free STS transitions with minimal difficulty.  (LOW CHAIRS DIFFICULT)  Negotiate stairs reciprocally max 3/10 pain to be able to get around her house without difficulty. (MET)  At least 30 min

## 2019-04-22 RX ORDER — FLUTICASONE PROPIONATE 50 MCG
SPRAY, SUSPENSION (ML) NASAL
Qty: 16 G | Refills: 1 | Status: SHIPPED | OUTPATIENT
Start: 2019-04-22 | End: 2019-09-10

## 2019-04-22 NOTE — PROGRESS NOTES
HPI:    Patient ID: Jud Fink is a 69year old female.     72-year-old female with history of lumbosacral surgery presents for follow-up of aching left lower extremity pain in the posterior lateral thigh with associated numbness and tingling that is mo 4 MG Oral Tablet Therapy Pack Take as directed Disp: 1 Package Rfl: 0   AMLODIPINE BESYLATE 5 MG Oral Tab TAKE ONE TABLET BY MOUTH ONE TIME DAILY  Disp: 90 tablet Rfl: 0   lansoprazole 30 MG Oral Capsule Delayed Release TAKE ONE CAPSULE BY MOUTH IN THE MOR 3/21/19:  FINDINGS:          BONES:             No acute fracture. No significant marrow edema. Susceptibility artifact projects over the visualized lower lumbar spine, compatible with prior fusion. Correlate with history.   SOFT TISSUES:            Ther

## 2019-04-22 NOTE — TELEPHONE ENCOUNTER
Patient requesting refill for     LEVOCETIRIZINE DIHYDROCHLORIDE 5 MG Oral Tab TAKE ONE TABLET BY MOUTH IN THE EVENING Disp: 2 tablet Rfl: 0     Was last filled for 2 tablets and requesting 30 or 90 day supply. Patient is out and its allergy season.

## 2019-04-22 NOTE — PROGRESS NOTES
Diagnosis: Right groin pain (R10.31)  Spinal stenosis of lumbar region with neurogenic claudication (F83.058)  Authorized # of Visits:  12 (MEDICARE PART A&B)         Next MD visit: none scheduled  Fall Risk: standard         Precautions: L2-5 fusion  Medi iliopsoas activation R 2 sets to fatigue  -BKFO 5 reps alt x4 min  -SLS hip isometrics 4x45 sec bilat  -Hip unloading TG DL Squat L5 x5 min EX  -Supine hip flexion ball rolls x30  -Supine heel slides into [facility] for iliopsoas activation R 2 sets to fatigue

## 2019-04-23 NOTE — TELEPHONE ENCOUNTER
Please advise regarding rx. Last refilled for quantity of 2.  Pt is requesting 30 day or 90 day supply  Refill Protocol Appointment Criteria  · Appointment scheduled in the past 12 months or in the next 3 months  Recent Outpatient Visits            Today Kirstin March

## 2019-04-25 NOTE — PROGRESS NOTES
Diagnosis: Right groin pain (R10.31)  Spinal stenosis of lumbar region with neurogenic claudication (J03.759)  Authorized # of Visits:  12 (MEDICARE PART A&B)         Next MD visit: none scheduled  Fall Risk: standard         Precautions: L2-5 fusion  Medi flexion with MET holds  -R hip harmonics  -STM R TFL vastus lateralis MT  -Long axis traction SIJ manip bilat  -R hip traction and lateral distraciton manip  -Mid T/S and TL manip  -FMP joint technique into R hip flexion with MET holds  -R hip harmonics  - max 3/10 pain to be able to get around her house without difficulty. (MET)  At least 30 min walking to be able to go grocery shopping without difficulty max 3/10 pain  Improve FOTO scores by 10 pts to demonstrate functional improvement.      Plan: Continue

## 2019-04-25 NOTE — TELEPHONE ENCOUNTER
Please review; protocol failed.  D/t labs  Requested Prescriptions     Pending Prescriptions Disp Refills   • AMLODIPINE BESYLATE 5 MG Oral Tab [Pharmacy Med Name: Amlodipine Besylate 5 Mg Tab Asce] 90 tablet 0     Sig: TAKE ONE TABLET BY MOUTH ONE TIME ERICKA

## 2019-05-02 NOTE — PROGRESS NOTES
Diagnosis: Right groin pain (R10.31)  Spinal stenosis of lumbar region with neurogenic claudication (P32.869)  Authorized # of Visits:  12 (MEDICARE PART A&B)         Next MD visit: none scheduled  Fall Risk: standard         Precautions: L2-5 fusion  Medi bilat  -R hip traction and lateral distraciton manip  -Mid T/S and TL manip  -FMP joint technique into R hip flexion with MET holds  -R hip harmonics  -STM R TFL vastus lateralis MT  -Long axis traction SIJ manip R  -R hip traction manip  -Mid T/S and TL m 5x15 sec bilat  -BKFO 5 reps alt x4 min with biofeedback  -PPT against wall with arm raises 2x10 bilat   -Hip unloading TG DL Squat L5 x5 min  -STS with RTB knees and MB reaching 2x10 2 lb  -EOB Donkey kicks 3x10 bilat  -Self paraspinal release tennis ball

## 2019-05-06 NOTE — TELEPHONE ENCOUNTER
Refill request for gabapentin 300 mg, TID, #90, no refills    LOV: 4/22/19  NOV: 6/24/19  Last refilled on 4/10/19

## 2019-05-09 NOTE — PROGRESS NOTES
Diagnosis: Right groin pain (R10.31)  Spinal stenosis of lumbar region with neurogenic claudication (W38.148)  Authorized # of Visits:  12 (MEDICARE PART A&B)         Next MD visit: none scheduled  Fall Risk: standard         Precautions: L2-5 fusion  Medi limited by tissue tightness, but responded well to [facility] and stretching educated pt on proper self stretching with review of proximal hip strengthening. Ready to transition to full HEP next visit secondary to progress max.      Goals:  6 wks  Pain-free bed mo

## 2019-05-13 NOTE — PATIENT INSTRUCTIONS
All adult screening ordered and done appropriate for patient's age and gender and risk factors and complaints. Mammogram ordered. Pneumococcal vaccine ordered and administered. Medication reviewed and renewed where needed and appropriate.   Comply with m

## 2019-05-13 NOTE — PROGRESS NOTES
HPI:    Patient ID: Aileen Suresh is a 69year old female.     69year old male here for medicare physical and for status update on any confirmed chronic medical illnesses and follow up on any previous labs or procedures that were suggestive or in need of TABLET BY MOUTH IN THE EVENING Disp: 30 tablet Rfl: 0   lansoprazole 30 MG Oral Capsule Delayed Release TAKE ONE CAPSULE BY MOUTH IN THE MORNING BEFOR BREAKFAST Disp: 90 capsule Rfl: 1   lansoprazole 30 MG Oral Capsule Delayed Release TAKE ONE CAPSULE BY M for: FOB, OCCULTSTOOL No flowsheet data found. Glaucoma Screening      Ophthalmology Visit Annually       Bone Density Screening      Dexascan Every two years No results found for this or any previous visit. No flowsheet data found.    Pap and Pelvic flowsheet data found. Dilated Eye exam  Annually No flowsheet data found. No flowsheet data found. COPD      Spirometry Testing Annually No results found for this or any previous visit. No flowsheet data found.           General Health     In the pa Scorin    Scoring Interpretation: 0 - 3 No Risk     Depression Screening (PHQ-2/PHQ-9): Over the LAST 2 WEEKS   Little interest or pleasure in doing things (over the last two weeks)?: Not at all    Feeling down, depressed, or hopeless (over the last tw METABOLIC PANEL (14); Future  - URINALYSIS, ROUTINE; Future    3. S/P lumbar laminectomy  Stable; no complaints today    4. Dyslipidemia  Status update  - LIPID PANEL; Future    5. Breast cancer screening  Mammogram ordered.   - [facility] SCREENING BILAT (CPT=770

## 2019-05-17 NOTE — PROGRESS NOTES
Patient Name: Idalia Taylor, : 1945, MRN: [id number]   Date:  2019  Referring Physician:  Garret Edouard    Diagnosis: Right groin pain (R10.31)  Spinal stenosis of lumbar region with neurogenic claudication (L29.127)    Discharge Summary MT 1, EX 2       Total Timed Treatment: MT 15 min, EX 30 min  Total Treatment Time: 48 min    FOTO: 52/100 today, 44/100 at eval.    Plan: DC with full HEP due to goals met.     Thank you for your referral. If you have any questions, please contact me at [facility]

## 2019-05-20 RX ORDER — LEVOCETIRIZINE DIHYDROCHLORIDE 5 MG/1
TABLET, FILM COATED ORAL
Qty: 90 TABLET | Refills: 1 | Status: SHIPPED | OUTPATIENT
Start: 2019-05-20 | End: 2019-08-05

## 2019-05-20 NOTE — TELEPHONE ENCOUNTER
----- Message from Epifanio Alfaro DO sent at 5/14/2019  7:55 AM CDT -----  All labs have been reviewed and are normal.  Vitamin B12 is actually in excess. Patient actually could probably cut back on some of her vitamin B12 supplementation.

## 2019-05-20 NOTE — TELEPHONE ENCOUNTER
Left message for pt DARWIN turpin for verbal release advised of message from Kim Betts. Advised to contact office with further questions or concerns.

## 2019-05-20 NOTE — TELEPHONE ENCOUNTER
Advised patient of Dr. Reji Yuan note. Patient verbalized understanding. Patient indicated that she only takes one a day 50+ multivitamin and it contains 25mg of B-12, otherwise does not taking any additional Vitamin B-12 supplementation. Please advise.

## 2019-05-22 NOTE — TELEPHONE ENCOUNTER
Methocarbamol 750mg. Tkae 1 tablet BID. #60.  No refills    Last filled-4/17/2019    LOV-4/22/2019  NOV-6/24/2019

## 2019-05-24 NOTE — TELEPHONE ENCOUNTER
Refill request for meloxicam 15 mg, take 1 tab daily as needed, #30, no refills    LOV: 4/22/19  NOV: 6/24/19  Last refilled on 4/22/19

## 2019-05-29 PROBLEM — Z51.81 ENCOUNTER FOR THERAPEUTIC DRUG MONITORING: Status: ACTIVE | Noted: 2019-05-29

## 2019-05-29 NOTE — PROGRESS NOTES
[address], [address]  Dept: [phone]       Patient:  Kye Garcia  :      1945  MRN:      [id number]    Chief Complaint:  Patient presents wit EVENING Disp: 2 tablet Rfl: 0   FLUTICASONE PROPIONATE 50 MCG/ACT Nasal Suspension spray 2 sprays in each nostril daily Disp: 16 g Rfl: 1   lansoprazole 30 MG Oral Capsule Delayed Release TAKE ONE CAPSULE BY MOUTH IN THE MORNING BEFOR BREAKFAST Disp: 90 ca on file        Inability: Not on file      Transportation needs:        Medical: Not on file        Non-medical: Not on file    Tobacco Use      Smoking status: Former Smoker        Packs/day: 0.00        Years: 15.00        Pack years: 0        Start date COMBINED     • POSTERIOR LUMBAR INTERBODY FUSION - MIS TLIF 1 LEVEL N/A 10/20/2016    Performed by Landon Parra MD at [place] [address]  5/21/2014    Lumbar microdiscectomy   • TOTAL ABDOMINAL HYSTERECTOMY  1992    w/ B back pain  Neurological: negative  Behavioral/Psych: negative  Endocrine: negative  All other systems were reviewed and are negative    Physical Exam:   General appearance: alert, cooperative and mildly obese  Head: Normocephalic, without obvious abnormali for therapeutic drug monitoring          Josefina Velazquez MD

## 2019-05-30 RX ORDER — LOSARTAN POTASSIUM AND HYDROCHLOROTHIAZIDE 12.5; 5 MG/1; MG/1
TABLET ORAL
Qty: 90 TABLET | Refills: 0 | OUTPATIENT
Start: 2019-05-30

## 2019-05-30 NOTE — TELEPHONE ENCOUNTER
Duplicate request, previously addressed.   [address], [phone]   Outpatient Medication Detail      Disp Refills Start End    LOSARTAN POTASSIUM-HCTZ 50-12.5 MG Oral Tab 90 tablet 1 3/5/2019     Sig: TAKE ONE TABLET BY MOUTH ONE TIME DAILY     Sent to pharmacy as: Losartan Potassium-HCTZ 50-12.5 MG Oral Tablet    E-Prescribing Status: Receipt confirmed by pharmacy (3/5/2019 12:48 PM CST)      Too soon for refill

## 2019-06-05 NOTE — TELEPHONE ENCOUNTER
Please review; protocol failed.     Requested Prescriptions     Pending Prescriptions Disp Refills   • SHAROBEL 0.35 MG Oral Tab [Pharmacy Med Name: Sharobel 0.35 Mg Tab Nort] 84 tablet 0     Sig: TAKE ONE TABLET BY MOUTH ONE TIME DAILY         Recent Visit

## 2019-06-24 NOTE — PATIENT INSTRUCTIONS
Increase the gabapentin as follows: Take 1 cap in the morning, 2 capsules at nighttime x1 week, then    2 capsules in the mroning, 2 capsules at nighttime.

## 2019-06-24 NOTE — PROGRESS NOTES
HPI:    Patient ID: Evens Benitez is a 69year old female.     45-year-old female with history of lumbosacral surgery presents for follow-up of aching left lower extremity pain in the posterior lateral thigh with associated numbness and tingling that is mo every evening.  Disp: 30 tablet Rfl: 2   Meloxicam 15 MG Oral Tab Take 1 tablet daily as needed Disp: 30 tablet Rfl: 0   LEVOCETIRIZINE DIHYDROCHLORIDE 5 MG Oral Tab TAKE ONE TABLET BY MOUTH IN THE EVENING Disp: 90 tablet Rfl: 1   GABAPENTIN 300 MG Oral Cap pain with flexion or extension    Provocative tests: Negative supine SLR bilaterally. ttp bilateral proximal lateral hips.    Neurological:   Strength: 5/5 LE bilaterally    Sensation: decreased sensation to LT left lateral leg and dorsum of the foot    Ref

## 2019-06-25 NOTE — TELEPHONE ENCOUNTER
Meloxicam 15mg. Take 1 tablet daily PRN. #30. No refills.     Last filled- 5/24/2019    LOV-6/24/2019  NOV-8/5/2019

## 2019-06-26 NOTE — TELEPHONE ENCOUNTER
[employer] Medicare Rx RE: Methocarbamol use high risk in elderly patient. Letter left in Dr. Agustina Shaw for review.

## 2019-07-10 RX ORDER — GABAPENTIN 300 MG/1
CAPSULE ORAL
Qty: 120 CAPSULE | Refills: 1 | Status: SHIPPED | OUTPATIENT
Start: 2019-07-10 | End: 2019-08-05

## 2019-07-10 NOTE — TELEPHONE ENCOUNTER
Gabapentin 300mg Take 2 cap BID. #120.  No refill    Last filled-5/6/2019    LOV-6/24/2019  NOV-8/5/2019

## 2019-07-26 NOTE — TELEPHONE ENCOUNTER
Medication request: Meloxicam 15mg 1 TAB QD PRN    LOV: 06/24/19  NOV: 08/05/19    [place] 06/26/19 #30 r-0    S/w patient who states she takes meloxicam daily and she needs a refill.

## 2019-07-26 NOTE — TELEPHONE ENCOUNTER
Refill request for methocarbamol 750 mg, BID, #60, no refills    LOV: 6/24/19  NOV: 8/5/19  Last refilled on 6/24/19

## 2019-07-26 NOTE — TELEPHONE ENCOUNTER
Refill passed per [facility] protocol.     Hypertensive Medications  Protocol Criteria:  · Appointment scheduled in the past 6 months or in the next 3 months  · BMP or CMP in the past 12 months  · Creatinine result < 2  Recent Outpatient Visits

## 2019-07-29 NOTE — TELEPHONE ENCOUNTER
Action Requested: Summary for Provider     []  Critical Lab, Recommendations Needed  [x] Need Additional Advice  []   FYI    []   Need Orders  [] Need Medications Sent to Pharmacy  []  Other     SUMMARY: Patient is requesting to see only Dr. Vickie Galindo this

## 2019-08-05 NOTE — PROGRESS NOTES
HPI:    Patient ID: Patt Oconnell is a 69year old female. 72-year-old female with history of lumbar fusion presents for follow-up. States that she continues to improve since last time I saw her.   She has very lateral right hip pain, and the back seem admits        Current Outpatient Medications:  gabapentin 300 MG Oral Cap TAKE 2 CAPSULES BY MOUTH TWO TIMES DAILY Disp: 120 capsule Rfl: 1   Meloxicam 15 MG Oral Tab TAKE ONE TABLET BY MOUTH DAILY AS NEEDED Disp: 30 tablet Rfl: 0   METHOCARBAMOL 750 MG Or Constitutional: She appears well-developed and well-nourished. HENT:   Head: Normocephalic and atraumatic.    Eyes: Conjunctivae and EOM are normal.   Musculoskeletal:   Lumbar range of motion: Lumbar extension to approximately 10 degrees with mild bila

## 2019-08-13 NOTE — PATIENT INSTRUCTIONS
Medication reviewed and renewed where needed and appropriate. Comply with medications. Monitor blood pressures and record at home. Limit salt intake. Recommend weight loss via daily exercising and consistent healthy dietary changes.   DEXA scan ordered f

## 2019-08-13 NOTE — PROGRESS NOTES
HPI:    Patient ID: Milena Trejo is a 69year old female.     Patient is a 72-year-old -American female who on July 26, 2019 was traveling back to [facility] from a short stent from out of town stopped at a rest area with her family somewhat fatigued POTASSIUM-HCTZ 50-12.5 MG Oral Tab TAKE ONE TABLET BY MOUTH ONE TIME DAILY  Disp: 90 tablet Rfl: 1   ibuprofen 200 MG Oral Tab Take 200 mg by mouth every 6 (six) hours as needed for Pain.  Disp:  Rfl:    Clindamycin Phosphate 1 % External Lotion Apply 1 [facility] Visit:  Requested Prescriptions      No prescriptions requested or ordered in this encounter       Imaging & Referrals:  XR DEXA BONE DENSITOMETRY (CPT=77080)  Patient Instructions   Medication reviewed and renewed where needed and appropriate.   Comply wit

## 2019-08-28 NOTE — PROGRESS NOTES
[address]  Dept: [phone]       Patient:  Evens Benitez  :      1945  MRN:      [id number]    Chief Complaint:  Patient presents wit MOUTH IN THE EVENING Disp: 2 tablet Rfl: 0   FLUTICASONE PROPIONATE 50 MCG/ACT Nasal Suspension spray 2 sprays in each nostril daily Disp: 16 g Rfl: 1   lansoprazole 30 MG Oral Capsule Delayed Release TAKE ONE CAPSULE BY MOUTH IN THE MORNING BEFOR BREAKFAS starting to quit. 4/22/17 is the quiting date    Substance and Sexual Activity      Alcohol use:  Yes        Alcohol/week: 0.0 standard drinks        Comment: occasionally      Drug use: No      Sexual activity: Not on file    Lifestyle      Physical Jorge Olson Problem Relation Age of Onset   • Diabetes Father    • Hypertension Mother    • Lipids Mother    • Heart Disorder Mother    • Breast Cancer Sister         in her 29's.    • Cancer Other         Uncles with lung cancer       Food Journal  · Marlon and Sonja Sidhu intact. Fundi benign.   Lungs: clear to auscultation bilaterally  Heart: S1, S2 normal, no murmur, click, rub or gallop, regular rate and rhythm  Abdomen: soft, obese, non tender  Extremities: extremities normal, atraumatic, no cyanosis or edema  Pulses: 2+ refilled. Will start at 8 mg  Needs ekg    Diagnoses and all orders for this visit:    Essential hypertension    Increased appetite    Weight gain    Encounter for therapeutic drug monitoring    Obesity (BMI 30-39. 9)          Tyra Parr MD

## 2019-08-30 NOTE — TELEPHONE ENCOUNTER
Refill passed per [facility] protocol.   Hypertensive Medications  Protocol Criteria:  · Appointment scheduled in the past 6 months or in the next 3 months  · BMP or CMP in the past 12 months  · Creatinine result < 2  Recent Outpatient Visits

## 2019-09-03 NOTE — TELEPHONE ENCOUNTER
Pt LM on office VM re: status of phentermine Rx.    Returned pt phone call advising her that receipt confirmed by [facility] on [address]. 8/28/19  Pt will call pharm to confirm

## 2019-09-09 NOTE — TELEPHONE ENCOUNTER
Dr Kena Ellington, see messages--separate scripts for the losartan and HCTZ? See pended scripts. Called patient, she clarified that [facility] said her losartan-HCTZ combo is out of stock. Verified pharmacy.     Called [facility], the combo is out of stock, on backorder,

## 2019-09-09 NOTE — TELEPHONE ENCOUNTER
Per pt the pharmacy has sent two faxes to ask if the medication they are changing her medication to is sufficient? Pt was informed losartan is on back order. She is unsure of the name of the alt. She has been out for 1 week.        Current Outpatient Medica

## 2019-09-10 NOTE — TELEPHONE ENCOUNTER
Refill passed per [facility] protocol.   Refill Protocol Appointment Criteria  · Appointment scheduled in the past 6 months or in the next 3 months  Recent Outpatient Visits            1 week ago Essential hypertension    [facility]

## 2019-09-20 NOTE — TELEPHONE ENCOUNTER
Action Requested: Summary for Provider     []  Critical Lab, Recommendations Needed  [] Need Additional Advice  []   FYI    []   Need Orders  [] Need Medications Sent to Pharmacy  []  Other     SUMMARY: Per protocol, advised office visit.  Nothing available

## 2019-09-20 NOTE — TELEPHONE ENCOUNTER
Pt states having severe pain in left should and Pt is unable to raise her arm and has a bump on right side that her bra covers which has some discomfort.      Transferred to triage

## 2019-09-20 NOTE — PROGRESS NOTES
HPI:    Patient ID: Mary Anne Manuel is a 69year old female. HPI she came in today complainig of left sided neck pain radiating to her shoulder , she Woke up on Wednesday morning with pain on her left side . She does have pain with movement.  Being still Psychiatric/Behavioral: Negative for agitation, behavioral problems, confusion, hallucinations and sleep disturbance. The patient is not nervous/anxious.             Current Outpatient Medications:  FLUTICASONE PROPIONATE 50 MCG/ACT Nasal Suspension spray 2 hydrocortisone 1 % External Cream Apply 1 Application topically 2 (two) times daily. Disp: 30 g Rfl: 0   Probiotic Product (ACIDOPHILUS PROBIOTIC BLEND OR) Take  by mouth 2 (two) times daily.  Disp:  Rfl:    Multiple Vitamins-Minerals (MULTI-VITAMIN/MINERAL Constitutional: She is oriented to person, place, and time. She appears well-developed and well-nourished. No distress. HENT:   Head: Normocephalic and atraumatic. Right Ear: Tympanic membrane and external ear normal. No drainage or tenderness.  No mast She has no axillary adenopathy. Neurological: She is alert and oriented to person, place, and time. She has normal reflexes. No cranial nerve deficit. She exhibits normal muscle tone. Coordination normal.   Skin: Skin is warm, dry and intact.  No rash n

## 2019-09-20 NOTE — PATIENT INSTRUCTIONS
Left-sided neck pain? Spondylosis-we will try Medrol Dosepak can take methocarbamol muscle relaxant twice a day. Neck exercises.   If not better follow-up with PCP      Right-sided skin mole + skin tag- referal to dermatology

## 2019-09-25 NOTE — TELEPHONE ENCOUNTER
Advised patient of Dr. Tamika Busby note. Patient verbalized understanding  Patient states she will get x-ray done tomorrow.

## 2019-09-25 NOTE — TELEPHONE ENCOUNTER
Pt states saw Dr Jony Browning 9/20/19 for left sided neck pain. Per pt pain still persists despite taking Medrol Pack. Pt is asking if she can have an order for a neck x ray. Dr Nella Willams unavailable today, pt asking if Dr Jony Browning can order an x ray.     Please

## 2019-09-26 NOTE — TELEPHONE ENCOUNTER
Patient called in stating that the x-ray that she was requesting was supposed to be for her Left shoulder and not her cervical spine.      She is currently going to have her Dexa Scan done and is asking to have this order updated so she can have it done aft

## 2019-09-26 NOTE — TELEPHONE ENCOUNTER
Dr. Simona Blount - patient saw Dr. Arian Pierce for her neck and shoulder pain and requested an xray which she's trying to get today. Patient states that she is out of medication for muscle relaxer and requests a refill.

## 2019-09-26 NOTE — TELEPHONE ENCOUNTER
Refill Protocol Appointment Criteria  · Appointment scheduled in the past 12 months or in the next 3 months  Recent Outpatient Visits            6 days ago Skin mole    Hira Calles MD    Office Visit    4 weeks a

## 2019-11-01 NOTE — PROGRESS NOTES
[address]  Dept: [phone]       Patient:  Khushi Francis  :      1945  MRN:      [id number]    Chief Complaint:  Patient presents wit each nostril daily, Disp: 3 Inhaler, Rfl: 1  losartan Potassium 50 MG Oral Tab, Take 1 tablet (50 mg total) by mouth daily. , Disp: 90 tablet, Rfl: 1  hydrochlorothiazide 12.5 MG Oral Tab, Take 1 tablet (12.5 mg total) by mouth daily. , Disp: 90 tablet, Rfl: on file      Food insecurity:        Worry: Not on file        Inability: Not on file      Transportation needs:        Medical: Not on file        Non-medical: Not on file    Tobacco Use      Smoking status: Former Smoker        Packs/day: 0.00        Yea IR EPIDURAL STERIOD INJECTION     • LUMBAR SPINE FUSION COMBINED     • POSTERIOR LUMBAR INTERBODY FUSION - MIS TLIF 1 LEVEL N/A 10/20/2016    Performed by Eric Lyn MD at [place] [address]  5/21/2014    Lumbar microd negative  Musculoskeletal:positive for arthralgias and back pain  Neurological: negative  Behavioral/Psych: negative  Endocrine: negative  All other systems were reviewed and are negative    Physical Exam:   General appearance: alert, cooperative and mildl for therapeutic drug monitoring    Obesity (BMI 30-39. 9)          Colleen Tillman MD

## 2019-11-05 NOTE — PROGRESS NOTES
SPINE EVALUATION:   Referring Physician: Dr. Vincenzo Vazquez  Diagnosis: neck pain Date of Service: 11/5/2019     PATIENT SUMMARY   Jaleel Olivas is a 69year old female who presents to therapy today with complaints of L sided neck pain, stiffness, with pain shoot stiffness. The results of the objective tests and measures show loss of functional neck and shoulder mobility, facet jt restrictions along C/S and T/S. Functional deficits include but are not limited to reaching OH, turning head, driving.   Signs and sympt diagnosis, treatment plan, expectations, and prognosis.  Pt was also provided recommendations for activity modifications, possible soreness after evaluation, postural corrections, ergonomics and importance of remaining active  Patient was instructed in and via fax as soon as possible to [phone].  If you have any questions, please contact me at Dept: [phone]    Sincerely,  Electronically signed by therapist: Shayna Gonsales PT    Physician's certification required: Yes  I certify the need for these se

## 2019-11-07 NOTE — PROGRESS NOTES
Dx: neck pain        Insurance (Authorized # of Visits):  8 (MEDICARE PART A&B           Authorizing Physician: Dr. Pearl Arguello  Next MD visit: none scheduled        Precautions: L2-S1 fusion, HTN         Subjective: Reports her neck was sore yesterday but francine

## 2019-11-12 NOTE — PROGRESS NOTES
Dx: neck pain        Insurance (Authorized # of Visits):  8 (MEDICARE PART A&B           Authorizing Physician: Dr. Lori Dutta  Next MD visit: none scheduled        Precautions: L2-S1 fusion, HTN         Subjective: Reports neck soreness was mild after last ses EX  -Biofeedback chin tucks x10 hold 10 sec  -modified quadruped 2x10  -Touchdowns wall slides with deep breath and shrug 2x5  -unloaded scapula, bilat neck rotation 2x10 EX  -Biofeedback chin tucks x10 hold 10 sec  -quadruped rocking x10 with neutral sp

## 2019-11-14 NOTE — TELEPHONE ENCOUNTER
Refill passed per [facility], [facility] protocol.   Refill Protocol Appointment Criteria  · Appointment scheduled in the past 12 months or in the next 3 months  Recent Outpatient Visits            6 days ago Neck pain    [address] [facility],

## 2019-11-19 NOTE — PROGRESS NOTES
Dx: neck pain        Insurance (Authorized # of Visits):  8 (MEDICARE PART A&B           Authorizing Physician: Dr. Chepe March  Next MD visit: none scheduled        Precautions: L2-S1 fusion, HTN         Subjective: States neck is doing much better and is more distraction and framing bilat MT  -CT junction harmonics  -Upper and mid T/S manip  -Seated L CT junction rotational manip  -prone CT junction gapping manip  -PIVMs L rotation mid and upper C/S  -FMP supine cervical rotation tissue technique  -Scapular dis

## 2019-11-19 NOTE — PROGRESS NOTES
Dx: neck pain        Insurance (Authorized # of Visits):  8 (MEDICARE PART A&B)           Authorizing Physician: Dr. Pearl Arguello  Next MD visit: none scheduled        Precautions: L2-S1 fusion, HTN         Subjective: Reports her neck is doing much better and is harmonics  -Upper and mid T/S manip  -Seated L CT junction rotational manip  -prone CT junction gapping manip  -PIVMs L rotation mid and upper C/S  -FMP supine cervical rotation tissue technique  -Scapular distraction and framing bilat  -UT and levator str

## 2019-11-21 NOTE — PROGRESS NOTES
Dx: neck pain        Insurance (Authorized # of Visits):  8 (MEDICARE PART A&B)           Authorizing Physician: Dr. Paris Miranda MD visit: none scheduled        Precautions: L2-S1 fusion, HTN         Subjective: No complaint.  States she is less stiff and p harmonics  -Upper and mid T/S manip  -Seated L CT junction rotational manip  -prone CT junction gapping manip  -PIVMs L rotation mid and upper C/S  -FMP supine cervical rotation tissue technique  -Scapular distraction and framing bilat  -UT and levator str deep breath and shrug 3x5  -MET L serratus anterior\  -quadruped rocking with chin tuck hold x15   HEP - Touchdowns, seated neck rotation HEP - Reviewed for above   HEP - Levator and upper trap stretching            Charges: MT 2, EX 2      Total Timed Joe

## 2019-12-09 NOTE — PROGRESS NOTES
Discharge Summary  Pt has attended 7 visits in Physical Therapy  Dx: neck pain        Insurance (Authorized # of Visits):  8 (MEDICARE PART A&B)           Authorizing Physician: Dr. Sabrina Jackson  Next MD visit: none scheduled        Precautions: L2-S1 fusion, HTN required: Yes  I certify the need for these services furnished under this plan of treatment and while under my care.     X___________________________________________________ Date____________________    Certification From: 51/6/6503  To:3/8/2020    Date: 12/

## 2019-12-15 NOTE — PROGRESS NOTES
Bhavin Julien is a 69year old female. HPI:     CC:  Patient presents with:  Moles: NEW PATIENT - Pt presenting for a mole on back. Pt states she noticed it about 2 years ago. Pt states it bothers her when she wears certain bras.   Pt denies drainage or Take by mouth daily.      • SHAROBEL 0.35 MG Oral Tab TAKE ONE TABLET BY MOUTH ONE TIME DAILY  84 tablet 0   • gabapentin 300 MG Oral Cap TAKE 2 CAPSULES BY MOUTH TWO TIMES DAILY 120 capsule 2   • topiramate 25 MG Oral Tab Take 1 tablet (25 mg total) by brynn Colon polyp    • High blood pressure    • High cholesterol    • HTN (hypertension) 3/20/2015   • Obesity (BMI 30-39. 9)    • Osteoarthritis    • Other and unspecified hyperlipidemia    • Unspecified essential hypertension    • Uterine fibroid      Past Surg Active member of club or organization: Not on file        Attends meetings of clubs or organizations: Not on file        Relationship status: Not on file      Intimate partner violence:        Fear of current or ex partner: Not on file        Emotionall problematic lesions. Patient presents with concerns above. Patient has been in their usual state of health. History, medications, allergies reviewed as noted. ROS:  Denies any other systemic complaints.   No new or changeing lesions other than n given regarding benign skin lesions. Follow-up as needed  Please refer to map for specific lesions. See additional diagnoses. Pros cons of various therapies, risks benefits discussed. Pathophysiology discussed with patient. Therapeutic options reviewed.

## 2020-01-14 NOTE — TELEPHONE ENCOUNTER
Review pended refill request as it does not fall under a protocol.   Requested Prescriptions     Pending Prescriptions Disp Refills   • CLINDAMYCIN PHOSPHATE 1 % External Lotion [Pharmacy Med Name: Clindamycin Phosphate 1 % Lot Gree] 60 mL 0     Sig: apply

## 2020-01-17 NOTE — PROGRESS NOTES
[address], [address]  Dept: [phone]       Patient:  Babatunde Miranda  :      1945  MRN:      [id number]    Chief Complaint:  Patient presents wit methocarbamol 750 MG Oral Tab Take 1 tablet  by mouth 2 (two) times daily as needed (muscle tightness/spasms).  60 tablet 0   • lansoprazole 30 MG Oral Capsule Delayed Release TAKE ONE CAPSULE BY MOUTH IN THE MORNING BEFOR BREAKFAST 90 capsule 1   • FLUTICA Smoker        Packs/day: 0.00        Years: 15.00        Pack years: 0        Start date: 12/26/2017      Smokeless tobacco: Never Used      Tobacco comment: starting to quit. 4/22/17 is the quiting date    Substance and Sexual Activity      Alcohol use:  [facility] COLONOSCOPY  11/2015   • COLONOSCOPY     • HYSTERECTOMY     • IR EPIDURAL STERIOD INJECTION     • LUMBAR SPINE FUSION COMBINED     • POSTERIOR LUMBAR INTERBODY FUSION - MIS TLIF 1 LEVEL N/A 10/20/2016    Performed by Ute Durham MD at [address] [place] [address] fatigue  Respiratory: negative  Cardiovascular: negative  Gastrointestinal: negative  Musculoskeletal:positive for arthralgias and back pain  Neurological: negative  Behavioral/Psych: negative  Endocrine: negative  All other systems were reviewed and are n for this visit:    Essential hypertension    High cholesterol    Increased appetite    Weight gain    Encounter for therapeutic drug monitoring    Obesity (BMI 30-39. 9)          Tamiko Wilcox MD

## 2020-01-23 DIAGNOSIS — I10 ESSENTIAL HYPERTENSION: ICD-10-CM

## 2020-01-23 RX ORDER — AMLODIPINE BESYLATE 5 MG/1
TABLET ORAL
Qty: 90 TABLET | Refills: 1 | Status: SHIPPED | OUTPATIENT
Start: 2020-01-23 | End: 2020-07-13

## 2020-01-27 NOTE — TELEPHONE ENCOUNTER
Medication request: Gabapentin 600 mg oral Capsule. Take 2 capsules by mouth 2 times daily. #120. No refills.      LOV: 11/05/19  NOV: 05/05/2020    [place] 11/05/19

## 2020-02-13 NOTE — TELEPHONE ENCOUNTER
Requested Prescriptions     Pending Prescriptions Disp Refills   • SHAROBEL 0.35 MG Oral Tab [Pharmacy Med Name: Sharobel 0.35 Mg Tab Nort] 84 tablet 0     Sig: TAKE ONE TABLET BY MOUTH ONE TIME DAILY         Recent Visits  Date Type Provider Dept   08/13/

## 2020-03-03 NOTE — TELEPHONE ENCOUNTER
To reception staff, pls call pt for appt. Also [employer] message sent to pt       Please review; protocol failed.      Requested Prescriptions     Pending Prescriptions Disp Refills   • LOSARTAN POTASSIUM 50 MG Oral Tab [Pharmacy Med Name: Losartan Kalia

## 2020-03-16 NOTE — TELEPHONE ENCOUNTER
Medication request: Gabapentin 600 mg Oral Cap. Take 2 capsules by mouth 2 times daily. #120. No refills.      LOV: 11/05/19  NOV: 5/5/2020    [place]: 1/27/2020

## 2020-03-17 NOTE — PROGRESS NOTES
HPI:    Patient ID: Avani Garner is a 69year old female. Patient presents for follow-up on hypertension. No chest pain, shortness of breath, palpitations, cough, headaches or vision changes noted.  Patient takes losartan 50 mg, HCTZ 12.5 mg, Amlodipin needed for Pain. • acyclovir 5 % External Ointment Apply 1 g topically as needed. 30 g 0   • ASPIRIN EC LOW DOSE 81 MG Oral Tab EC TAKE 1 TABLET BY MOUTH DAILY. 30 tablet 3   • simvastatin 20 MG Oral Tab Take 20 mg by mouth nightly.      • hydrocortison Visit:  Requested Prescriptions      No prescriptions requested or ordered in this encounter       Imaging & Referrals:  None         ID#[id number]

## 2020-03-19 NOTE — TELEPHONE ENCOUNTER
Pharmacy called in to order Rx Lansoprazole 30 MG.  Please advise         Current Outpatient Medications   Medication Sig Dispense Refill      120 capsule 0      90 tablet 0      90 tablet 0      84 tablet 0      90 tablet 1      60 mL 1      90 tablet 1

## 2020-03-25 NOTE — PROGRESS NOTES
Diagnosis: L5 radiculopathy  Authorized # of Visits:  12         Next MD visit: none scheduled  Fall Risk: standard         Precautions: n/a           Medication Changes since last visit?: No  Subjective: Reports she is noticing less frequent episodes of B dissociation 6 min.  -Standing hip flexor stretch L stance x2 min   -prone hip ext EOB 2x10 hold 3 sec  -S/L clams with TA brace bilat hold 5 sec x10  -Frog position PPT x20 hold 5 sec EX  -quadruped rocking x10 hold 5 sec with PPT  -Standing hip flexor st Statement Selected

## 2020-04-14 ENCOUNTER — TELEPHONE (OUTPATIENT)
Dept: FAMILY MEDICINE CLINIC | Facility: CLINIC | Age: 75
End: 2020-04-14

## 2020-04-14 DIAGNOSIS — Z12.39 BREAST CANCER SCREENING: Primary | ICD-10-CM

## 2020-04-14 NOTE — TELEPHONE ENCOUNTER
Hello,    Patient called  and left  requesting a order for mammogram. Please contact patient once order has been signed. Thank you, Nevaeh Ohara Specialist    Managed Care.

## 2020-04-15 NOTE — TELEPHONE ENCOUNTER
Refill request for gabapentin 300 mg, 2 po BID, #120, no refills    LOV: 11/5/19  NOV: 6/16/20  Last refilled on 3/16/20

## 2020-05-05 ENCOUNTER — TELEPHONE (OUTPATIENT)
Dept: FAMILY MEDICINE CLINIC | Facility: CLINIC | Age: 75
End: 2020-05-05

## 2020-05-05 RX ORDER — ACETAMINOPHEN AND CODEINE PHOSPHATE 120; 12 MG/5ML; MG/5ML
0.35 SOLUTION ORAL DAILY
Qty: 84 TABLET | Refills: 1 | Status: SHIPPED | OUTPATIENT
Start: 2020-05-05 | End: 2020-10-28

## 2020-05-12 NOTE — TELEPHONE ENCOUNTER
Medication request: Gabapentin 300mg 2 CAPS BID    LOV: 11/05/19  NOV: 06/16/20    [place] 04/15/20 #120 r-0

## 2020-05-29 ENCOUNTER — TELEPHONE (OUTPATIENT)
Dept: FAMILY MEDICINE CLINIC | Facility: CLINIC | Age: 75
End: 2020-05-29

## 2020-05-29 NOTE — TELEPHONE ENCOUNTER
Patient wants to discuss if having a  colonoscopy is necessary.  She has reminders on [employer]

## 2020-05-30 NOTE — TELEPHONE ENCOUNTER
Yes according to her GI specialist note from Dr. Hellen Dave he recommended a repeat colonoscopy 5 years from her previous one which was in 2015. According to the specialist she is due.   If she has specific questions as to why it would be better addressed by the specialist.

## 2020-06-16 NOTE — PROGRESS NOTES
HPI:    Patient ID: Mauricio Angeles is a 70year old female. 26-year-old female returns for follow-up.   She continues to experience bilateral lower back pain described more as a stiffness in a bandlike pattern, with associated left leg numbness and cramp topiramate 25 MG Oral Tab Take 1 tablet (25 mg total) by mouth daily.  90 tablet 1   • lansoprazole 30 MG Oral Capsule Delayed Release TAKE ONE CAPSULE BY MOUTH IN THE MORNING BEFOR BREAKFAST 90 capsule 1   • AMLODIPINE BESYLATE 5 MG Oral Tab TAKE ONE TABLE continue therapeutic exercises on her own.   We did discuss that she may benefit from increasing the dose of gabapentin or adding another neuropathic medication if the lower back pain and radicular symptoms begin to affect her ADLs more frequently, but the

## 2020-06-16 NOTE — PROGRESS NOTES
[address]  [address] [place] [address],[address]  Dept: [phone]       Patient:  Bhavin Julien  :      1945  MRN:      [id number]    Chief Complaint:  Patient presents wit Oral Tab Take 1 tablet (0.35 mg total) by mouth daily. 84 tablet 1   • topiramate 25 MG Oral Tab Take 1 tablet (25 mg total) by mouth daily.  90 tablet 1   • lansoprazole 30 MG Oral Capsule Delayed Release TAKE ONE CAPSULE BY MOUTH IN THE MORNING ASHLEY HAWLEY quit. 4/22/17 is the quiting date    Substance and Sexual Activity      Alcohol use:  Yes        Alcohol/week: 0.0 standard drinks        Comment: occasionally      Drug use: No      Sexual activity: Not on file    Lifestyle      Physical activity:        D POSTERIOR LUMBAR INTERBODY FUSION - MIS TLIF 1 LEVEL N/A 10/20/2016    Performed by Desiree Guo MD at [place] [address]  5/21/2014    Lumbar microdiscectomy   • TOTAL ABDOMINAL HYSTERECTOMY  1992    w/ BSO   • TUBAL LI pain  Neurological: negative  Behavioral/Psych: negative  Endocrine: negative  All other systems were reviewed and are negative    Physical Exam:   General appearance: alert, cooperative and mildly obese  Head: Normocephalic, without obvious abnormality, a 30-39. 9)          Autumn Pa MD

## 2020-07-06 NOTE — TELEPHONE ENCOUNTER
If the weakness gets worse she needs to go to the ER or be seen here in the clinic. If the weakness is mild recommend she take gabapentin 600mg AM, 300mg afternoon, 600mg at night time.

## 2020-07-06 NOTE — TELEPHONE ENCOUNTER
Spoke to patient and notified her of below. She states that the weakness has been about the same. She states that she will try to increase gabapentin and see how she does. She will need a refill by the end of the week.      Gabapentin 300 mg, 2 caps in morn

## 2020-07-06 NOTE — TELEPHONE ENCOUNTER
Spoke to patient. She has been taking meloxicam 15 mg prn and does need a refill. She states that she has been having pain in left leg, from her hip down to her foot.  She states that she has a hard time walking due to pain and at times is dragging her l

## 2020-07-06 NOTE — TELEPHONE ENCOUNTER
90.3 meloxicam 15 mg, take 1 tab daily prn, #30, no refills    LOV: 6/16/20  NOV: none  Last refilled on 7/16/19

## 2020-07-20 NOTE — PATIENT INSTRUCTIONS
All adult screening ordered and done appropriate for patient's age and gender and risk factors and complaints. Medication reviewed and renewed where needed and appropriate.   Recommend weight loss via daily exercising and consistent healthy dietary changes

## 2020-07-22 NOTE — PROGRESS NOTES
HPI:    Patient ID: Idalia Taylor is a 70year old female.     This patient is a 44-year-old -American female here for Medicare annual visit and for status update on any confirmed chronic medical illnesses and follow up on any previous labs or proce PROBIOTIC BLEND OR) Take  by mouth 2 (two) times daily. • Multiple Vitamins-Minerals (MULTI-VITAMIN/MINERALS) Oral Tab Take 1 tablet by mouth daily. • ibuprofen 200 MG Oral Tab Take 200 mg by mouth every 6 (six) hours as needed for Pain.      • acyc results found for: CHLAMYDIA No flowsheet data found. Screening Mammogram      Mammogram  Annually Mammogram due on 05/16/2021 Update Health Maintenance if applicable   Immunizations      Influenza No orders found for this or any previous visit.  Update describe your daily physical activity?: (P) Light    How would you describe your current health state?: (P) Good    How do you maintain positive mental well-being?: (P) Social Interaction;Puzzles; Visiting Friends; Visiting Family    If you are a male age 41 hearing over the telephone:  No I have trouble following the conversations when two or more people are talking at the same time:  No   I have trouble understanding things on the TV:  No I have to strain to understand conversations:  No   I have to worry ab Neurological: She is alert and oriented to person, place, and time. No cranial nerve deficit. ASSESSMENT/PLAN:   1. Medicare annual wellness visit, initial  Exam has been performed and the survey is completed.     2. Essential hypertension  B 11/20/2020), or if symptoms worsen or fail to improve.          IN#[id number]

## 2020-07-27 NOTE — TELEPHONE ENCOUNTER
Prior authorization for Methocarbamol 750mg completed via covermymeds and sent to Alverto Chacon  Awaiting determination

## 2020-07-28 NOTE — TELEPHONE ENCOUNTER
Latoya Serrano would like call back to discuss medication.  States medications have been tried but failed     REQ# [id number]

## 2020-08-27 NOTE — TELEPHONE ENCOUNTER
Refill request for gabapentin 300 mg, take 2 caps in morning, 1 cap in afternoon, and 2 caps in evening, #150, 1 refill    LOV: 6/16/20  NOV: none  Lats refilled on 7/6 with 1 refill

## 2020-09-14 NOTE — PROGRESS NOTES
[address]  Dept: [phone]       Patient:  Isai Shore  :      1945  MRN:      [id number]    Chief Complaint:  Patient presents wit tablet 1   • LOMAIRA 8 MG Oral Tab Take one tablet with breakfast and one at noon 60 tablet 2   • topiramate 25 MG Oral Tab Take 1 tablet (25 mg total) by mouth daily.  90 tablet 1   • FLUTICASONE PROPIONATE 50 MCG/ACT Nasal Suspension spray 2 sprays in eac Years: 15.00        Pack years: 0        Start date: 12/26/2017      Smokeless tobacco: Never Used      Tobacco comment: starting to quit. 4/22/17 is the quiting date    Substance and Sexual Activity      Alcohol use:  Yes        Alcohol/week: 0.0 mark COLONOSCOPY  11/2015   • COLONOSCOPY     • HYSTERECTOMY     • IR EPIDURAL STERIOD INJECTION     • LUMBAR SPINE FUSION COMBINED     • POSTERIOR LUMBAR INTERBODY FUSION - MIS TLIF 1 LEVEL N/A 10/20/2016    Performed by Eric Lyn MD at [address] [place] [address] fatigue  Respiratory: negative  Cardiovascular: negative  Gastrointestinal: negative  Musculoskeletal:positive for arthralgias and back pain  Neurological: negative  Behavioral/Psych: negative  Endocrine: negative  All other systems were reviewed and are n for this visit:    Essential hypertension    High cholesterol    Increased appetite    Encounter for therapeutic drug monitoring    Obesity (BMI 30-39. 9)          Jack Westbrook MD

## 2020-09-20 NOTE — TELEPHONE ENCOUNTER
Requesting Amlodipine refill    Hypertensive Medications  Protocol Criteria:  · Appointment scheduled in the past 6 months or in the next 3 months  · BMP or CMP in the past 12 months  · Creatinine result < 2  Recent Outpatient Visits            1 week ago no

## 2020-10-06 NOTE — TELEPHONE ENCOUNTER
----- Message from Laury Banegas RN sent at 11/30/2015  8:25 AM CST -----  Regarding: colon recall  Repeat colon in 5 years per Dr Julian Brock

## 2020-10-14 NOTE — TELEPHONE ENCOUNTER
Patient calling to schedule 5 year colonoscopy recall. Last Procedure:  Colonoscopy 11/05/2015  Last Diagnosis:  1. Single, minute, incidental descending colon polyp removed today as above.   2. No obstructing process to correlate with the change in carmen consent was obtained. She was sedated as above. Digital rectal exam was performed which showed no masses.  The Olympus pediatric video colonoscope was placed in the patient's rectum and advanced under direct visualization through the entire length of the co prep, thank you  Last operative report visible in epic.

## 2020-10-15 NOTE — TELEPHONE ENCOUNTER
Trilyte/generic equivalent bowel prep sent to the pharmacy, per provider OK.  Pt to f/u w/ pharmacy  time and cost.

## 2020-10-15 NOTE — TELEPHONE ENCOUNTER
[facility] states Golytely is on back order and requesting Trilyte rx instead. Please call. Thank you.

## 2020-10-15 NOTE — TELEPHONE ENCOUNTER
Thanks all. Recent visits with Dr. Wilman Iyer 09/14/2020, Dr Julien Munroe 7/20/2020 reviewed. GI schedulers –    Please schedule colonoscopy exam at [facility] (Chad Jara).  Previous exam was at [facility]>    BMI Readings from Last 1 [place]

## 2020-10-22 NOTE — TELEPHONE ENCOUNTER
Scheduled for:  Colonoscopy - [address]72  Provider Name:  Dr. Aditya Ziegler  Date:  11/18/20  Location:  [facility]  Sedation:  MAC  Time:  Approx 3:15 pm (pt is aware that [facility] will call with arrival time)  Prep:  Trilyte, Prep instructions were given to pt over the phone, p

## 2020-10-27 NOTE — TELEPHONE ENCOUNTER
Refill request for meloxicam 15 mg, take 1 tab daily as needed, #30, no refills    LOV: 6/16/20  NOV: None  Last refilled on 7/6/20

## 2020-11-06 NOTE — TELEPHONE ENCOUNTER
Refill request for gabapentin 300 mg, take 2 caps in morning, 1 cap in afternoon, and 2 caps in evening, #150, 1 refill    LOV: 6/16/20  NOV: none  Last refilled on 8/27/20 with 1 refill

## 2020-11-17 ENCOUNTER — TELEPHONE (OUTPATIENT)
Dept: FAMILY MEDICINE CLINIC | Facility: CLINIC | Age: 75
End: 2020-11-17

## 2020-11-17 DIAGNOSIS — Z23 NEED FOR PNEUMOCOCCAL VACCINATION: Primary | ICD-10-CM

## 2020-11-17 NOTE — TELEPHONE ENCOUNTER
Patient had flu shot done at [facility] in [place], [address] in October. Patient would like to know if she is due for her pneumonia vaccine and if there is more than one kind. Please advise.

## 2020-11-18 NOTE — PROCEDURES
[facility]      COLONOSCOPY PROCEDURE REPORT     DATE OF PROCEDURE:  11/18/2020     PCP: Jose Hussein DO     PREOPERATIVE DIAGNOSIS: History adenomatous colon polyp     POSTOPERATIVE DIAGNOSIS:  See impression.      SURGEON: days to prevent bleeding

## 2020-11-19 NOTE — TELEPHONE ENCOUNTER
Dr Isaac Ridley, please see pt's message. Looks like she had [facility] on 5/1/19. Do you recommend [id number] at this time? Order pending if approve.

## 2020-11-24 ENCOUNTER — TELEPHONE (OUTPATIENT)
Dept: GASTROENTEROLOGY | Facility: CLINIC | Age: 75
End: 2020-11-24

## 2020-12-01 NOTE — PROGRESS NOTES
[address]  Dept: [phone]       Patient:  Shreya Melton  :      1945  MRN:      [id number]    Chief Complaint:  Patient presents wit May sub with Tgeneric equivalent if needed.  1 Bottle 0   • AMLODIPINE BESYLATE 5 MG Oral Tab TAKE ONE TABLET BY MOUTH ONE TIME DAILY  90 tablet 0   • lansoprazole 30 MG Oral Capsule Delayed Release Take one tablet by mouth every morning before breakfast 90 Transportation needs        Medical: Not on file        Non-medical: Not on file    Tobacco Use      Smoking status: Former Smoker        Packs/day: 0.00        Years: 15.00        Pack years: 0        Start date: 12/26/2017      Smokeless tobacco: Never device. .        The patient does live in a home with stairs.     Surgical History:    Past Surgical History:   Procedure Laterality Date   •      • COLONOSCOPY  2015   • COLONOSCOPY     • HYSTERECTOMY     • IR EPIDURAL STERIOD INJECTION 20 to 30 minutes to complete each meal    Exercise Goals Reviewed and Discussed    Continue to ambulate    ROS:    Constitutional: positive for fatigue  Respiratory: negative  Cardiovascular: negative  Gastrointestinal: negative  Musculoskeletal:positive f refill    PHENTERMINE:tolerating Lomaira    Follow up with PCP as scheduled       SLOW BURNER    Diagnoses and all orders for this visit:    Essential hypertension    Increased appetite    High cholesterol    Encounter for therapeutic drug monitoring    Ob

## 2020-12-06 DIAGNOSIS — M48.062 SPINAL STENOSIS OF LUMBAR REGION WITH NEUROGENIC CLAUDICATION: ICD-10-CM

## 2020-12-07 RX ORDER — GABAPENTIN 300 MG/1
CAPSULE ORAL
Qty: 150 CAPSULE | Refills: 2 | Status: SHIPPED | OUTPATIENT
Start: 2020-12-07 | End: 2021-02-23

## 2020-12-07 NOTE — TELEPHONE ENCOUNTER
Refill request for gabapentin 300 mg, take 2 caps in morning, 1 cap in afternoon, and 2 caps in evening, #150, no refills    LOV: 6/16/20  NOV: none  Last refilled on 11/6/20

## 2021-01-05 NOTE — PROGRESS NOTES
HPI:    Patient ID: Ivette Purcell is a 70year old female. 72-year-old female presents for follow-up. Approximately 1 to 2 weeks ago she had increasing lower back and left leg pain for approximately 3 to 4 days, then the symptoms returned to baseline. MOUTH DAILY AS NEEDED  30 tablet 0   • AMLODIPINE BESYLATE 5 MG Oral Tab TAKE ONE TABLET BY MOUTH ONE TIME DAILY  90 tablet 0   • LEVOCETIRIZINE DIHYDROCHLORIDE 5 MG Oral Tab TAKE ONE TABLET BY MOUTH IN THE EVENING 90 tablet 0   • methocarbamol 750 MG Oral recommend increasing the gabapentin to 600 mg 3 times daily to avoid an increase in pain in the evening. She will continue with the methocarbamol 500 mg as needed for muscle pain/spasms. Follow-up in 6 months or earlier as needed.     30 minutes spent pre

## 2021-01-05 NOTE — PATIENT INSTRUCTIONS
Start taking the gabapentin 2 capsules 3 times daily. You may use the glider for exercise; start slow for short durations, and if it does not increase your symptoms you may use this regularly.

## 2021-02-17 NOTE — PROGRESS NOTES
[address]  Dept: [phone]       Patient:  Rangel Dunn  :      1945  MRN:      [id number]    Chief Complaint:  Patient presents wit Tab TAKE ONE TABLET BY MOUTH ONE TIME DAILY  90 tablet 0   • LOMAIRA 8 MG Oral Tab Take one tablet with breakfast and one at noon 60 tablet 2   • topiramate 25 MG Oral Tab Take 1 tablet (25 mg total) by mouth daily.  90 tablet 1   • MELOXICAM 15 MG Oral Tab Smoker        Packs/day: 0.00        Years: 15.00        Pack years: 0        Start date: 12/26/2017      Smokeless tobacco: Never Used      Tobacco comment: starting to quit. 4/22/17 is the quiting date    Substance and Sexual Activity      Alcohol use:  Rocky Long Date   •      • COLONOSCOPY  2015   • COLONOSCOPY     • HYSTERECTOMY     • IR EPIDURAL STERIOD INJECTION     • LUMBAR SPINE FUSION COMBINED     • POSTERIOR LUMBAR INTERBODY FUSION - MIS TLIF 1 LEVEL N/A 10/20/2016    Performed by Digna Moreno positive for fatigue  Respiratory: negative  Cardiovascular: negative  Gastrointestinal: negative  Musculoskeletal:positive for arthralgias and back pain  Neurological: negative  Behavioral/Psych: negative  Endocrine: negative  All other systems were revie hypertension    Increased appetite    Encounter for therapeutic drug monitoring    Obesity (BMI 30-39. 9)          Sidney Mason MD

## 2021-02-23 DIAGNOSIS — M48.062 SPINAL STENOSIS OF LUMBAR REGION WITH NEUROGENIC CLAUDICATION: ICD-10-CM

## 2021-02-23 RX ORDER — GABAPENTIN 300 MG/1
CAPSULE ORAL
Qty: 150 CAPSULE | Refills: 2 | Status: SHIPPED | OUTPATIENT
Start: 2021-02-23 | End: 2021-05-13

## 2021-04-05 NOTE — PATIENT INSTRUCTIONS
Frozen Shoulder (Adhesive Capsulitis)  Frozen shoulder is when pain and stiffness make it hard to move your shoulder normally. This condition is also called adhesive capsulitis. The shoulder is a ball-and-socket joint.  The ball on the upper arm bone fit increase your range of motion. In rare cases, surgery may be needed to remove the scar tissue. Over time, most people with frozen shoulder get back nearly all range of motion without pain. Recovery may take a few months up to 1 year.  You may still feel van

## 2021-04-05 NOTE — PROGRESS NOTES
HPI/Subjective:   Patient ID: Ivette Purcell is a 70year old female. Shoulder Pain   The pain is present in the right shoulder and left shoulder. The current episode started 1 to 4 weeks ago. The problem occurs daily.  The problem has been rapidly worse sprays in each nostril daily 48 g 0   • CLINDAMYCIN PHOSPHATE 1 % External Lotion apply 1 application topically twice daily 60 mL 1   • acyclovir 5 % External Ointment Apply 1 g topically as needed.  30 g 0   • ASPIRIN EC LOW DOSE 81 MG Oral Tab EC TAKE 1 T encounter.       Meds This Visit:  Requested Prescriptions      No prescriptions requested or ordered in this encounter       Imaging & Referrals:  PHYSIATRY - INTERNAL  PHYSICAL THERAPY - INTERNAL  XR SHOULDER BILAT EM (CPT=73030)

## 2021-04-07 NOTE — TELEPHONE ENCOUNTER
S/w patient who states that Dr. Eliane Rothman ordered PT and she see Dr. Antoni Werner, asked if she need me to set up appointment with Dr. Antoni Werner and advised she can schedule PT if Dr. Eliane Rothman instructed her to do so.  Patient verbalized understanding and will come see

## 2021-04-19 NOTE — PROGRESS NOTES
Progress note    C/C: pain in both shoulders    HPI: 74-year-old female presents with a new issue. In the beginning of March she began developing bilateral shoulder pain, left worse than right.   Symptoms started on the left side first, and then the right therapy as scheduled. If symptoms worsen consider MRI of both shoulders. The left shoulder in particular is mildly concerning for adhesive capsulitis, though she believes that the range of motion of the left shoulder has actually been improving.     F/u a

## 2021-04-27 NOTE — PROGRESS NOTES
SPINE EVALUATION:   Referring Physician: Dr. Jaqui Reese  Diagnosis: BL shoulder pain     Date of Service: 4/27/2021     PATIENT SUMMARY   Jermaine Perez is a 78year old female who presents to therapy today with complaints of BL shoulder pain L > R, beginning Precautions:  None  OBJECTIVE:   Observation/Posture: Fwd head/rounded shoulders/inc thoracic kyphosis  Neuro Screen: not warranted    Cervical AROM: (* denotes performed with pain)  Flexion: WNL  Extension: 75%  Sidebending: R 75%; L WNL  Rotation: R will improve shoulder abduction AROM to >130 degrees to improve ability to don deodorant, don/doff shirts, and wash hair   · Pt will increase shoulder AROM IR to T12 to be able to reach in back pocket, tuck in shirt, and turn steering wheel without pain  ·

## 2021-04-29 NOTE — PROGRESS NOTES
Dx: L > R shoulder pain         Insurance (Authorized # of Visits):  Medicare (10)           Authorizing Physician: Dr. Brie Amaya  Next MD visit: none scheduled  Fall Risk: standard         Precautions: n/a             Subjective: Has not done exercise- havin Treatment: 42 min  Total Treatment Time: 42 min

## 2021-05-03 NOTE — TELEPHONE ENCOUNTER
Medication request: Meloxicam 15mg TAKE ONE TABLET BY MOUTH DAILY AS NEEDED     LOV: 04/19/21   NOV: none    [place] 10/27/20 #30 r-0

## 2021-05-10 NOTE — PROGRESS NOTES
[address]  Dept: [phone]       Patient:  Sabas Jack  :      1945  MRN:      [id number]    Chief Complaint:  Patient presents wit Capsule Delayed Release TAKE ONE CAPSULE BY MOUTH EVERY DAY IN THE MORNING BEFORE BREAKFAST 90 capsule 0   • LOSARTAN POTASSIUM 50 MG Oral Tab TAKE ONE TABLET BY MOUTH ONE TIME DAILY  90 tablet 0   • HYDROCHLOROTHIAZIDE 12.5 MG Oral Tab TAKE ONE TABLET BY Packs/day: 0.00        Years: 15.00        Pack years: 0        Start date: 12/26/2017      Smokeless tobacco: Never Used      Tobacco comment: starting to quit. 4/22/17 is the quiting date    Vaping Use      Vaping Use: Never used    Substance and Sexual Club or Organization Meetings:       Marital Status:   Intimate Partner Violence:       Fear of Current or Ex-Partner:       Emotionally Abused:       Physically Abused:       Sexually Abused:   Surgical History:    Past Surgical History:   Procedure Later take 20 to 30 minutes to complete each meal    Exercise Goals Reviewed and Discussed    Continue to ambulate    ROS:    Constitutional: positive for fatigue  Respiratory: negative  Cardiovascular: negative  Gastrointestinal: negative  Musculoskeletal:posit Lomaira twice a day    Follow up with PCP as scheduled       SLOW BURNER    Diagnoses and all orders for this visit:    Essential hypertension    Increased appetite    Encounter for therapeutic drug monitoring    Obesity (BMI 30-39. 9)          Henry Gracia

## 2021-05-13 DIAGNOSIS — M48.062 SPINAL STENOSIS OF LUMBAR REGION WITH NEUROGENIC CLAUDICATION: ICD-10-CM

## 2021-05-13 RX ORDER — GABAPENTIN 300 MG/1
600 CAPSULE ORAL 3 TIMES DAILY
Qty: 360 CAPSULE | Refills: 2 | Status: SHIPPED | OUTPATIENT
Start: 2021-05-13 | End: 2021-11-29

## 2021-05-13 NOTE — TELEPHONE ENCOUNTER
Medication request: Gabapentin 300mg take 2 capsules by mouth every morning, 1 capsule in the afternoon, and 2 capsules every evening    LOV: 04/19/21  NOV: none    [place]: 02/23/21 #150 r-2      Changed Rx to #180 since patient taking 2 caps TID.

## 2021-05-18 NOTE — PROGRESS NOTES
Dx: L > R shoulder pain         Insurance (Authorized # of Visits):  Medicare (10)           Authorizing Physician: Dr. Colon ref.  provider found  Next MD visit: none scheduled  Fall Risk: standard         Precautions: n/a             Subjective: Shoulders st improved from pant line to L2 (R T12)      Therex:  Supine stick stretch flexion x20 stop at P1  Sidelying ER 2# x20  Shoulder ext RTB x20  Standing ER x20 RTB (slight pull/tight in neck)  Review of IR resistance set up for home (pt was having difficulty s

## 2021-05-20 NOTE — PROGRESS NOTES
Dx: L > R shoulder pain         Insurance (Authorized # of Visits):  Medicare (10)           Authorizing Physician: Dr. Colon ref.  provider found  Next MD visit: none scheduled  Fall Risk: standard         Precautions: n/a             Subjective: A little sor improved with reps (ROM)  Overhead flexion, inf glide grade III+ GHJ     Therex:  Supine stick stretch flexion x20 stop at P1  Sidelying ER 2# x20  Shoulder ext RTB x20  Standing ER x20 RTB (slight pull/tight in neck)  [facility]

## 2021-05-25 NOTE — PROGRESS NOTES
Dx: L > R shoulder pain         Insurance (Authorized # of Visits):  Medicare (10)           Authorizing Physician: Dr. Colon ref.  provider found  Next MD visit: none scheduled  Fall Risk: standard         Precautions: n/a             Subjective: 75%+ improve pant line to L2 (R T12) Manual therapy  Posterior glide GHJ grade III+  Repeated physiologic stretching IR to P1, improved with reps (ROM)  Overhead flexion, inf glide grade III+ GHJ Manual therapy  Repeated physiologic IR stretching to P1  Posterior glide

## 2021-06-17 NOTE — PROGRESS NOTES
Dimas  Pt has attended 6 visits in Physical Therapy. Dx: L > R shoulder pain         Insurance (Authorized # of Visits):  Medicare (10)           Authorizing Physician: Dr. Colon ref.  provider found  Next MD visit: none scheduled  Fall Risk: referral. If you have any questions, please contact me at Dept: [phone].     Sincerely,  Electronically signed by therapist: Kai Black PT     Physician's certification required:  No  Please co-sign or sign and return this letter via fax as hoa x20  RTB wall taps (chest height) 2x10 each arm   T/s stretch over chair x20   Review/update of HEP Therex  Ball up wall flexion x20  Strap stretch HBB x20  Sh ext 3x10 RTB  Double RTB ER 3x10  Double RTB IR 3x10  Reach and roll x20  Sidelying ER 2# 2x10

## 2021-07-26 NOTE — PROGRESS NOTES
HPI/Subjective:   Patient ID: Cassi Che is a 78year old female.     78year old -American female for Medicare annual visit and for status update on any confirmed chronic medical illnesses and follow up on any previous labs or procedures that we Take 325 mg by mouth every 6 (six) hours as needed for Pain. • topiramate 25 MG Oral Tab Take 1 tablet (25 mg total) by mouth daily. 90 tablet 1   • acyclovir 5 % External Ointment Apply 1 g topically as needed.  30 g 0   • ASPIRIN EC LOW DOSE 81 MG Ora two years Last Dexa Scan:    XR DEXA BONE DENSITOMETRY (CPT=77080) 09/26/2019   No flowsheet data found.    Pap and Pelvic      Pap: Every 3 yrs age 21-65 or Pap+HPV every 5 yrs age 33-67, age 72 and older at high risk   No recommendations at this time [facility] LDL  Annually LDL Cholesterol (mg/dL)   Date Value   07/21/2020 110 (H)    No flowsheet data found. Dilated Eye exam  Annually No flowsheet data found. No flowsheet data found.     COPD      Spirometry Testing Annually No results found for this or a Depression Screening (PHQ-2/PHQ-9): Over the LAST 2 WEEKS                      Advance Directives     Do you have a healthcare power of ?: Yes    Do you have a living will?: No     Hearing Assessment (Required for AWV/SWV)      Hearing Screenin Encounter      CBC With Differential With Platelet      Comp Metabolic Panel (14)      Lipid Panel      TSH W Reflex To Free T4      Urinalysis, Routine      TETANUS, DIPHTHERIA TOXOIDS AND ACELLULAR PERTUSIS VACCINE (TDAP), >7 YEARS, IM USE      Meds This

## 2021-08-05 NOTE — TELEPHONE ENCOUNTER
Medication request: MELOXICAM 15 MG Oral Tab  Sig:   TAKE ONE TABLET BY MOUTH DAILY AS NEEDED    LOV: 4/19/21  NOV: 8/17/21    [place] 5/3/21 qty#30 r-0       Patient requesting rx, denies worsening symptoms or side effects.   Patient states afte

## 2021-08-17 NOTE — TELEPHONE ENCOUNTER
Per Medicare Guidelines -no authorization is required for radiology     Status: Approved-Covered Benefit     Patient can proceed with scheduling L-Spine MRI w+wo CPT 96880    mychart message sent to patient informing her of the above

## 2021-08-17 NOTE — PROGRESS NOTES
Progress note    C/C: low back pain    HPI: 81-year-old female with history of lumbar fusion presents for follow-up.   She has had increasing bilateral lower back pain at the waistline with intermittent radiation to the left lower extremity, associated numb note that this documentation was created using Dragon speech technology; please excuse any typos.     Miles Steen DO  Physical Medicine and [address] [place] [address]

## 2021-08-17 NOTE — PATIENT INSTRUCTIONS
Take the meloxicam every day for the next 10-14 days with food. Stay well hydrated during this time.

## 2021-08-30 NOTE — PATIENT INSTRUCTIONS
This is a video on epidural steroid injections for spine pain made by the [employer], a multidisciplinary organization dedicated to the treatment of spine conditions.  The link is as follows:     [email or web address]

## 2021-08-30 NOTE — TELEPHONE ENCOUNTER
Per Medicare guidelines authorization is not required for Caudal epidural steroid injectio under fluoroscopy cpt code 55465. Will inform Nursing.

## 2021-08-30 NOTE — TELEPHONE ENCOUNTER
Patient has been scheduled forCaudal epidural steroid injection under fluoroscopy, IV conscious sedation  on 9/8/21 at the [address] [place] [address] with Se José.    -Anesthesia type: IV conscious sedation.  -If receiving MAC or IVC sedation patient will need to get CO

## 2021-08-30 NOTE — PROGRESS NOTES
Progress note    C/C: back pain    HPI: 79 yo f presents for f/u; mild relief of pain with meloxicam, but no improvement in back pain radiating down both legs, left worse than right.  Continues to be limited to walking about a block at most before needing t bilateral neural foraminal narrowing.    L2-L3: Postoperative changes of pedicle screw and posterior fusion.  There is adjacent susceptibility artifact which mildly limits evaluation.  There are hypertrophic changes of the facet joints and ligamentum flavum h/o HTN    We discussed treatment options, which include PT for neutral to flexion lumbar stabilization, caudal JACQUELINE under fluoroscopy, or both. Patient elects to proceed with caudal JACQUELINE.  Discussed risks of procedure, including bleeding, infection, nerve in

## 2021-09-03 NOTE — TELEPHONE ENCOUNTER
Refill passed per [facility], [facility] protocol.   Requested Prescriptions   Pending Prescriptions Disp Refills    LOSARTAN POTASSIUM 50 MG Oral Tab [Pharmacy Med Name: Losartan Potassium 50 Mg Tab [facility]] 90 tablet 0     Sig: TAKE ONE TABLET BY MOUTH ONE TIME ERICKA [facility], [address], [place] MEDICARE  NON SX F/U

## 2021-09-03 NOTE — TELEPHONE ENCOUNTER
Pt called and has questions about the baby aspirin. - Should she stop the aspirin. She has a procedure 9/8/21- please call to advise.

## 2021-09-03 NOTE — TELEPHONE ENCOUNTER
Patient contacted. She will hold low-dose aspirin, take all of her blood pressure medications morning of the procedure. She was again instructed to not take the phentermine, which she has been holding already.     America Barnett DO  Physical Medicine and [facility]

## 2021-09-08 NOTE — PROCEDURES
Caudal Epidural steroid injection under fluoroscopy    Dx: lumbar stenosis, history of lumbar fusion    Description: Risks and benefits discussed, patient agreed to proceed. Informed consent obtained prior to procedure.     The patient was positioned prone

## 2021-09-17 NOTE — PROGRESS NOTES
Progress note    C/C: low back pain    HPI: 51-year-old female presents for follow-up. She underwent caudal epidural steroid injection with fluoroscopy, IV conscious sedation, on 9/8/2021. Approximately 80% relief of symptoms.   She still has provoked reza [address] [place] [address]

## 2021-10-19 NOTE — TELEPHONE ENCOUNTER
Refill passed per [facility], [facility] protocol.    Requested Prescriptions   Pending Prescriptions Disp Refills    FLUTICASONE PROPIONATE 50 MCG/ACT Nasal Suspension [Pharmacy Med Name: Fluticasone Propionate 50 Mcg/Act Spr Hikm] 48 g 0     Sig: SPRAY TWICE IN

## 2021-11-02 NOTE — PROGRESS NOTES
[address]  Dept: [phone]       Patient:  New Lin  :      1945  MRN:      [id number]    Chief Complaint:  Patient presents wit total) by mouth daily. 90 tablet 1   • hydrochlorothiazide 12.5 MG Oral Tab Take 1 tablet (12.5 mg total) by mouth daily. 90 tablet 1   • Acetaminophen  MG Oral Tab CR Take 650 mg by mouth every 8 (eight) hours as needed for Pain.      • TOPIRAMATE 25 Smokeless tobacco: Never Used      Tobacco comment: starting to quit. 4/22/17 is the quiting date    Vaping Use      Vaping Use: Never used    Substance and Sexual Activity      Alcohol use:  Yes        Alcohol/week: 0.0 standard drinks        Comment: occ Organizations: Not on file      Attends Club or Organization Meetings: Not on file      Marital Status: Not on file  Intimate Partner Violence:       Fear of Current or Ex-Partner: Not on file      Emotionally Abused: Not on file      Physically Abused: No Eat 3 meals per day, Plan meals in advance, Read nutrition labels, Drink 64 oz of water per day, Maintain a daily food journal, No drinking 30 minutes before or after meals, Utlize portion control strategies to reduce calorie intake, Identify triggers for 48-64 ounces of non-caloric beverages per day. No fruit juices or regular soda. 3. Increase activity-upper body exercises, walk 10 minutes per day. 4. Increase fruit and vegetable servings to 5-6 per day. Tolerating topiramate.  Elevated leptin noted

## 2021-11-27 NOTE — TELEPHONE ENCOUNTER
Medication request: Gabapentin 300 mg oral cap. Take 2 capsules by mouth in the morning, at noon, and at bedtime. #360. 2 refills.       Lyndsay Calvin    [facility]/Last refill:8/26/21

## 2021-12-31 RX ORDER — ACETAMINOPHEN AND CODEINE PHOSPHATE 120; 12 MG/5ML; MG/5ML
SOLUTION ORAL
Qty: 84 TABLET | Refills: 0 | Status: SHIPPED | OUTPATIENT
Start: 2021-12-31 | End: 2022-03-28

## 2021-12-31 NOTE — TELEPHONE ENCOUNTER
I approved this prescription but would like you to review when you return. Could not figure out if she is on this as part of HRT, as I did not see estrogen on the medication list.  Should this be discontinued?

## 2022-01-03 NOTE — TELEPHONE ENCOUNTER
Thank you Slim Driscoll. When I looked under the medication tab it appears that I refill this in September 2021, however there is a line going through it, so I wonder whether this was was actually discontinued. I do not know of any patient including this 1 that I prescribed medication for HRT unless I know that there gynecologist is giving it for some reason. I will check into this upon my return.

## 2022-01-17 DIAGNOSIS — I10 ESSENTIAL HYPERTENSION: ICD-10-CM

## 2022-01-17 RX ORDER — LEVOCETIRIZINE DIHYDROCHLORIDE 5 MG/1
TABLET, FILM COATED ORAL
Qty: 90 TABLET | Refills: 1 | Status: SHIPPED | OUTPATIENT
Start: 2022-01-17 | End: 2022-07-10

## 2022-01-17 RX ORDER — AMLODIPINE BESYLATE 5 MG/1
5 TABLET ORAL DAILY
Qty: 90 TABLET | Refills: 1 | Status: SHIPPED | OUTPATIENT
Start: 2022-01-17 | End: 2022-07-10

## 2022-01-18 NOTE — TELEPHONE ENCOUNTER
Refill passed per [facility] protocol.      Requested Prescriptions   Pending Prescriptions Disp Refills    AMLODIPINE 5 MG Oral Tab [Pharmacy Med Name: Amlodipine Besylate 5 Mg Tab [facility]] 90 tablet 0     Sig: TAKE ONE TABLET BY MOUTH ONE TIME DAILY        Hypertensive Medications Protocol Passed - 1/17/2022 10:43 PM        Passed - CMP or BMP in past 12 months        Passed - Appointment in past 6 or next 3 months        Passed - GFR  > 50     Lab Results   Component Value Date    GFRAA 78 07/26/2021                    LEVOCETIRIZINE 5 MG Oral Tab [Pharmacy Med Name: Levocetirizine Dihydrochloride 5 Mg Tab [facility]] 90 tablet 0     Sig: TAKE ONE TABLET BY MOUTH EVERY DAY AT BEDTIME        Allergy Medication Protocol Passed - 1/17/2022 10:43 PM        Passed - Appoinment in past 12 or next 3 months               Future Appointments         Provider Department Appt Notes    In 2 weeks Maggie Zavala MD [address], [address], [place] MEDICARE  NON SX F/U             Recent Outpatient Visits              2 months ago Essential hypertension    [address], [address], Teresa Wheeler MD    Office Visit    4 months ago Spinal stenosis of lumbar region with neurogenic claudication    [facility], [address] [facility] [place], Jerald, DO    Office Visit    4 months ago Lumbar stenosis with neurogenic claudication    [facility] Beth Camacho,     Office Visit    4 months ago Spinal stenosis of lumbar region with neurogenic claudication    Jerald Bronson,     Office Visit    5 months ago Spinal stenosis of lumbar region with neurogenic claudication    [facility] Camacho,     Office Visit

## 2022-01-27 ENCOUNTER — TELEPHONE (OUTPATIENT)
Dept: PHYSICAL MEDICINE AND REHAB | Facility: CLINIC | Age: 77
End: 2022-01-27

## 2022-01-27 NOTE — TELEPHONE ENCOUNTER
Please schedule this patient for a follow up with Dr. Steven Templeton to be assessed for this issue.

## 2022-01-27 NOTE — TELEPHONE ENCOUNTER
Patient has been having shoulders pain over 1 month . Pt has been applying hot compress and taking some muscle relaxer and  Tylenol but is not helping  . Please assist

## 2022-01-28 NOTE — TELEPHONE ENCOUNTER
Medication request: GABAPENTIN 300 MG Oral Cap  Sig:   take 2 capsules by mouth in the morning, at noon and at bedtime  Qty#360R-0    LOV: 9/17/21  NOV: 2/2/22    [place]Last [place]:11/29/21

## 2022-01-31 ENCOUNTER — TELEPHONE (OUTPATIENT)
Dept: INTERNAL MEDICINE CLINIC | Facility: CLINIC | Age: 77
End: 2022-01-31

## 2022-01-31 DIAGNOSIS — M48.062 SPINAL STENOSIS OF LUMBAR REGION WITH NEUROGENIC CLAUDICATION: ICD-10-CM

## 2022-01-31 RX ORDER — GABAPENTIN 300 MG/1
CAPSULE ORAL
Qty: 360 CAPSULE | Refills: 0 | OUTPATIENT
Start: 2022-01-31

## 2022-01-31 NOTE — TELEPHONE ENCOUNTER
Patient dropped off form for parking [employer], would like to be called and a copy for herself after completed, placed in Kay[employer] White 1995.

## 2022-02-02 NOTE — PROGRESS NOTES
See today's telephone encounter for virtual visit details.     Hiram Jacobs DO  Physical Medicine and [address] [place] [address]

## 2022-02-25 NOTE — TELEPHONE ENCOUNTER
Refill passed per [place] [facility] [place], [facility] protocol.   Requested Prescriptions   Pending Prescriptions Disp Refills    HYDROCHLOROTHIAZIDE 12.5 MG Oral Tab [Pharmacy Med Name: Hydrochlorothiazide 12.5 Mg Tab Acco] 90 tablet 0     Sig: TAKE ONE TABLET BY MOUTH ONE TIME DAILY        Hypertensive Medications Protocol Failed - 2/24/2022  1:32 AM        Failed - Appointment in past 6 or next 3 months        Passed - CMP or BMP in past 12 months        Passed - GFR  > 50     Lab Results   Component Value Date    GFR 78 07/26/2021                    LOSARTAN 50 MG Oral Tab [Pharmacy Med Name: Losartan Potassium 50 Mg Tab Camb] 90 tablet 0     Sig: TAKE ONE TABLET BY MOUTH ONE TIME DAILY        Hypertensive Medications Protocol Failed - 2/24/2022  1:32 AM        Failed - Appointment in past 6 or next 3 months        Passed - CMP or BMP in past 12 months        Passed - GFR  > 50     Lab Results   Component Value Date    [facility] 78 07/26/2021                     Recent Outpatient Visits              3 weeks ago Rotator cuff impingement syndrome, unspecified laterality    [facility] [place] Department[facility]erald Mcleod DO    Telemedicine    3 weeks ago Essential hypertension    Juan J , Chris Alvarenga MD    Office Visit    3 months ago Essential hypertension    [employer], [address], Chris Alvarenga MD    Office Visit    5 months ago Spinal stenosis of lumbar region with neurogenic claudication    [facility], [address] [place], [place] [address]Jerald DO    Office Visit    5 months ago Lumbar stenosis with neurogenic claudication    [facility] Chin Husbands, DO    Office Visit          Future Appointments         Provider Department Appt Notes    In 2 months Remi Pate MD [employer], [address], [place] MEDICARE  NON SX F/U

## 2022-02-25 NOTE — TELEPHONE ENCOUNTER
This refill approved. Left message for patient to call back for patient to call to schedule a follow-up to receive additional refills.

## 2022-03-28 NOTE — TELEPHONE ENCOUNTER
Please review. Protocol failed / No protocol.    No Pap on file  Requested Prescriptions   Pending Prescriptions Disp Refills    NORETHINDRONE 0.35 MG Oral Tab [Pharmacy Med Name: Norethindrone 0.35 Mg Tab Alyssia] 84 tablet 0     Sig: TAKE ONE TABLET BY MOUTH ONE TIME DAILY        Gynecology Medication Protocol Failed - 3/28/2022 12:16 PM        Failed - Pass dependent on manual look-up of last PAP and patient compliance with PAP follow up recommendations        Passed - Appointment in past 12 or next 3 months            Recent Outpatient Visits              1 month ago Rotator cuff impingement syndrome, unspecified laterality    [facility] Lisandro Crystal , DO Jerald    Telemedicine    1 month ago Essential hypertension    [address], [address], Mary Obregon MD    Office Visit    4 months ago Essential hypertension    [address], [address], Mary Obregon MD    Office Visit    6 months ago Spinal stenosis of lumbar region with neurogenic claudication    [facility], [address] [place] [address], DO Jerald    Office Visit    6 months ago Lumbar stenosis with neurogenic claudication    [facility] Montrell Hernandez DO    Office Visit          Future Appointments         Provider Department Appt Notes    In 1 month Daniella Wheeler MD [address], [address], [place] MEDICARE  NON SX F/U

## 2022-04-04 NOTE — TELEPHONE ENCOUNTER
Drug meloxicam 15 mg #30 prn R 3 pend to Dr Galdino Wilburn    Last refill started at [facility] 2/2/22    Next appointment none. I spoke with patient, states taking meloxicam prn since LOV. States medication is helping.

## 2022-04-06 NOTE — TELEPHONE ENCOUNTER
Pt called in to schedule a procedure, she states she got a notification. I do not see a letter yet.  Please call

## 2022-04-06 NOTE — TELEPHONE ENCOUNTER
Colon was recalled for 7 years    Recall colon in 7 years per CB.  Colon done 11/18/2020    Health maintenance updated and message sent to pt outreach to complete colonoscopy in 7 years

## 2022-04-07 NOTE — TELEPHONE ENCOUNTER
Refill Request    Medication request: GABAPENTIN 300 MG CAP NORT. Take 2 capsules by mouth in the morning, at noon and at bedtime. LUX:71/15/3657 Ross Patel,    Due back to clinic per last office note:  Per Dr. Billingsley Ranks: \"Follow up in 3-6 months, or earlier PRN. \"  NOV: Visit date not found      [place]: 01/31/2022 #360    Urine drug screen (if applicable): none  Pain contract: none    LOV plan (if weaning or changing medications): No changes to medications at this time.

## 2022-04-13 NOTE — TELEPHONE ENCOUNTER
Prescription has been signed and sent to the pharmacy. Please let the patient know. The patient should wait until she has been called as it has to be approved per the message that popped up when I sent the prescription.

## 2022-04-13 NOTE — TELEPHONE ENCOUNTER
Spoke with patient, (  verified ) informed of 's message below    Patient verbalizes understanding and agrees.

## 2022-04-13 NOTE — TELEPHONE ENCOUNTER
Verified name and . Patient states that she has been using a prescription from Dr. Zee Varela for Voltaren gel 1% for carpal tunnel to bilateral wrists. She states she is longer seeing Dr. Zee Varela and is requesting that Dr. Jenny Wells order the prescription for her.     Medication pended for your review and approval.

## 2022-04-13 NOTE — TELEPHONE ENCOUNTER
Patient is requesting annual mammogram order, she is unsure if it will be a diagnostic or screening.

## 2022-04-13 NOTE — TELEPHONE ENCOUNTER
Patient has a question regarding the following medication that use to be prescribed by Dr. Ellyn Meier.        Diclofenac Sodium (VOLTAREN) 1 % Transdermal Gel

## 2022-05-04 PROBLEM — F43.9 STRESS: Status: ACTIVE | Noted: 2022-05-04

## 2022-05-30 RX ORDER — LOSARTAN POTASSIUM 50 MG/1
50 TABLET ORAL DAILY
Qty: 90 TABLET | Refills: 1 | Status: SHIPPED | OUTPATIENT
Start: 2022-05-30 | End: 2022-11-19

## 2022-05-30 NOTE — TELEPHONE ENCOUNTER
Refill passed per [address] protocol. Requested Prescriptions   Pending Prescriptions Disp Refills    LOSARTAN 50 MG Oral Tab [Pharmacy Med Name: Losartan Potassium 50 Mg Tab [facility]] 90 tablet 0     Sig: TAKE ONE TABLET BY MOUTH ONE TIME DAILY        Hypertensive Medications Protocol Passed - 5/30/2022  3:01 PM        Passed - CMP or BMP in past 12 months        Passed - Appointment in past 6 or next 3 months        Passed - GFR  > 50     Lab Results   Component Value Date    GFRAA 78 07/26/2021                       Recent Outpatient Visits              3 weeks ago [address] [place], MD    Office Visit    3 months ago Rotator cuff impingement syndrome, unspecified laterality    [facility]    Telemedicine    3 months ago Essential hypertension    [address], [address], Cassie Mon MD    Office Visit    6 months ago Essential hypertension    [address], [address], Cassie Mon MD    Office Visit    8 months ago Spinal stenosis of lumbar region with neurogenic claudication    [facility], [address] [place] [address]al[facility]    Office Visit            Future Appointments         Provider Department Appt Notes    Tomorrow [facility] [address] [facility] I am hopping for a clear reading!     In 1 month DO Geovany Rodriguez, [facility] annual, last px 7/26/21    In 2 months Diego Bailey, [address], [address], [address]

## 2022-06-06 RX ORDER — HYDROCHLOROTHIAZIDE 12.5 MG/1
12.5 TABLET ORAL DAILY
Qty: 90 TABLET | Refills: 1 | Status: SHIPPED | OUTPATIENT
Start: 2022-06-06 | End: 2022-09-02

## 2022-06-06 NOTE — TELEPHONE ENCOUNTER
Medication request: Gabapentin 300 mg oral cap. Take 2 capsules by mouth in the morning, at noon and at bedtime. #360. 0 refills    LOV: 9/17/2021  NOV: 6/14/22    [place]: 4/7/2022    Spoke to patient state she is having increased pain in shoulder with radiating pain down the arm when it is raining outside.  Did schedule a follow up or 6/14/022

## 2022-06-06 NOTE — TELEPHONE ENCOUNTER
Refill passed per [facility], [facility] protocol. Requested Prescriptions   Pending Prescriptions Disp Refills    HYDROCHLOROTHIAZIDE 12.5 MG Oral Tab [Pharmacy Med Name: Hydrochlorothiazide 12.5 Mg Tab Acco] 90 tablet 0     Sig: TAKE ONE TABLET BY MOUTH ONE TIME DAILY        Hypertensive Medications Protocol Passed - 6/5/2022  8:53 PM        Passed - CMP or BMP in past 12 months        Passed - Appointment in past 6 or next 3 months        Passed - GFR  > 50     Lab Results   Component Value Date    GFRAA 78 07/26/2021                     Recent Outpatient Visits              1 month ago [address] Radames Kwon MD    Office Visit    4 months ago Rotator cuff impingement syndrome, unspecified laterality    [facility], [facility]-Physiatry Libby West DO    Telemedicine    4 months ago Essential hypertension    [address], [address], Libra Hyatt MD    Office Visit    7 months ago Essential hypertension    [address], [address], Libra Hyatt MD    Office Visit    8 months ago Spinal stenosis of lumbar region with neurogenic claudication    [facility], [facility], [facility], [facility] Visit          Future Appointments         Provider Department Appt Notes    In 1 week Libby West, [facility]-Physiatry follow up on shoulder with radiating pain.      In 1 month Bushra Tanner DO [facility] annual, last px 7/26/21    In 2 months Neal Prom, [address], [address], [place]

## 2022-07-08 NOTE — TELEPHONE ENCOUNTER
Refill Request    Medication request: methocarbamol 750 MG Oral Tab    LOV:6/14/22 Libby West,    Due back to clinic per last office note:  4-6W or PRN  NOV: 7/14/22      [place]: 4/19/21 #180    Urine drug screen (if applicable): None  Pain contract: None    LOV plan (if weaning or changing medications): N/A    Left message-Autofill?

## 2022-07-08 NOTE — TELEPHONE ENCOUNTER
Spoke with patient states that she does need rx. Takes it PRN only needed for stiffness in low back. States does not take it BID. Sig changed to 1 tab daily PRN.

## 2022-07-14 NOTE — TELEPHONE ENCOUNTER
Per Medicare guidelines-no authorization required for Lidocaine/Kenalog injections in office     Left glenohumeral joint, right subacromial bursa steroid injections under US guidance CPT 20611x2+    Status: Approved-Covered Benefit    Routing to clinical staff to schedule

## 2022-07-14 NOTE — PROGRESS NOTES
PT scheduled in about 2 weeks. In the meantime increased bilateral shoulder pain, left worse than right, with decreased ROM of both shoulders, particularly the left worse than the right. ROM about the same from last exam. Has been taking celebrex BID PRN pain, which does help; shoulder pain waxes and wanes with activity. We discussed treatment, elects to proceed with left GH joint, right subacromial bursa steroid injections today, and will proceed with PT to restore the ROM as scheduled. Dx: right subacromial impingement  Procedure: right subacromial bursa steroid injection under US guidance    The patient is here for a right shoulder subdeltoid bursal injection done under ultrasound guidance. Under ultrasound guidance the right lateral subdeltoid bursa was identified and a jazmine was placed on the patient's skin. The skin was cleaned with alcohol swabs x 3 and anesthetized with ethyl chloride spray. Then a 25 gauge needle was inserted under ultrasound guidance into the right lateral subdeltoid bursa. Aspiration was performed and no blood, fluid, or air was aspirated. The patient was then injected with 4 ml of 1.0 ml of 40 mg of Kenalog/ml and 3.0 ml of 1% lidocaine without epinephrine. The needle was removed and a band aid was applied. Images saved of procedure. Dx: Primary osteoarthritis of left shoulder  Procedure: left GH joint steroid injection under US guidance    The patient is here for a left shoulder injection done under ultrasound guidance. Under ultrasound guidance the left posterior glenohumeral joint was identified and a jazmine was placed on the patient's skin. The skin was cleaned with alcohol swabs x 3 and anesthetized with ethyl chloride spray. Then a 25 gauge needle was inserted under ultrasound guidance into the left posterior glenohumeral joint. Aspiration was performed and no blood, fluid, or air was aspirated.   The patient was then injected with 4 ml of 1 ml of 40 mg of Kenalog/ml and 3 ml of 1% lidocaine without epinephrine. The needle was removed and a band aid was applied. These images are permanently stored in the ultrasound unit or PACS system.     She will proceed with PT, and then follow up afterwards if pain and stiffness persist.     Sammuel Net DO  Physical Medicine and [address]

## 2022-07-28 NOTE — PATIENT INSTRUCTIONS
All adult screening ordered and done appropriate for patient's age and gender and risk factors and complaints. Recommend weight loss via daily exercising and consistent healthy dietary changes. Encouraged physical fitness and daily physical activity daily. Comply with medications. Monitor blood pressures and record at home. Limit salt intake.

## 2022-08-04 DIAGNOSIS — M75.40 ROTATOR CUFF IMPINGEMENT SYNDROME, UNSPECIFIED LATERALITY: ICD-10-CM

## 2022-08-04 DIAGNOSIS — M19.012 OSTEOARTHRITIS OF LEFT GLENOHUMERAL JOINT: ICD-10-CM

## 2022-08-08 RX ORDER — CELECOXIB 100 MG/1
100 CAPSULE ORAL 2 TIMES DAILY PRN
Qty: 60 CAPSULE | Refills: 0 | Status: SHIPPED | OUTPATIENT
Start: 2022-08-08 | End: 2023-04-04

## 2022-08-08 NOTE — TELEPHONE ENCOUNTER
Refill Request    Medication request: celecoxib 100 MG Oral Cap    LOV:7/14/2022 Courtneyjustin West,    Due back to clinic per last office note:    [facility]: Visit date not found      [place]: 6/14/22 #60    Urine drug screen (if applicable): NOne  Pain contract: None    LOV plan (if weaning or changing medications): N/A    Spoke with patient states she's currently in PT and taking Celebrex and Tylenol with some relief. Takes 1 tabs in the am and 2 takes PRN. States she feels like she needs to take it BID. Denies side effects/worsening symptoms. Admits to 40% improvement with medication.

## 2022-08-09 DIAGNOSIS — M48.062 SPINAL STENOSIS OF LUMBAR REGION WITH NEUROGENIC CLAUDICATION: ICD-10-CM

## 2022-08-09 NOTE — TELEPHONE ENCOUNTER
Refill Request    Medication request: GABAPENTIN 300 MG Oral Cap    LOV:7/14/22 Jamar Castellon,    Due back to clinic per last office note:   Follow up if physical therapy does not help  NOV: Visit date not found      [place]: 6/6/22 #360    Urine drug screen (if applicable): None  Pain contract: None    LOV plan (if weaning or changing medications): N/A.

## 2022-08-11 RX ORDER — GABAPENTIN 300 MG/1
600 CAPSULE ORAL 3 TIMES DAILY
Qty: 360 CAPSULE | Refills: 2 | Status: SHIPPED | OUTPATIENT
Start: 2022-08-11 | End: 2023-01-26

## 2022-09-02 NOTE — TELEPHONE ENCOUNTER
Refill passed per [employer] protocol.   Requested Prescriptions   Pending Prescriptions Disp Refills    HYDROCHLOROTHIAZIDE 12.5 MG Oral Tab [Pharmacy Med Name: Hydrochlorothiazide 12.5 Mg Tab Acco] 90 tablet 0     Sig: TAKE ONE TABLET BY MOUTH ONE TIME DAILY        Hypertensive Medications Protocol Passed - 8/31/2022  4:09 PM        Passed - In person appointment in the past 12 or next 3 months       Recent Outpatient Visits              4 days ago     [address] [place] [address]    Office Visit    2 weeks ago     [address] [place] [address]    Office Visit    2 weeks ago Essential hypertension    [address], [address], Teresa Wheeler MD    Office Visit    2 weeks ago     [address] [place] [address]    Office Visit    3 weeks ago     [address] [place] [address]    Office Visit     Future Appointments         Provider Department Appt Notes    In 4 days Jacoby Lyons, [address] [facility] Medicare A+B / Suppl    In 2 months Maggie Zavala MD [facility], [address], [place] MEDICARE  NON SX F/U               Passed - Last BP reading less than 140/90     BP Readings from Last 1 Encounters:  08/17/22 : 120/78                Passed - CMP or BMP in past 6 months     Recent Results (from the past 4392 hour(s))   COMP METABOLIC PANEL (14)    Collection Time: 07/28/22  2:05 PM   Result Value Ref Range    Glucose 93 70 - 99 mg/dL    Sodium 143 136 - 145 mmol/L    Potassium 3.7 3.5 - 5.1 mmol/L    Chloride 110 98 - 112 mmol/L    CO2 25.0 21.0 - 32.0 mmol/L    Anion Gap 8 0 - 18 mmol/L    BUN 16 7 - 18 mg/dL    Creatinine 0.98 0.55 - 1.02 mg/dL    BUN/CREA Ratio 16.3 10.0 - 20.0    Calcium, Total 9.5 8.5 - 10.1 mg/dL    Calculated Osmolality 297 (H) 275 - 295 mOsm/kg    GFR, Non- 57 (L) >=60    GFR, -American 65 >=60    ALT 27 13 - 56 U/L    AST 15 15 - 37 U/L    Alkaline Phosphatase 71 55 - 142 U/L    Bilirubin, Total 0.4 0.1 - 2.0 mg/dL    Total Protein 7.3 6.4 - 8.2 g/dL    Albumin 3.7 3.4 - 5.0 g/dL    Globulin  3.6 2.8 - 4.4 g/dL    A/G Ratio 1.0 1.0 - 2.0    Patient Fasting for CMP? Yes      *Note: Due to a large number of results and/or encounters for the requested time period, some results have not been displayed. A complete set of results can be found in Results Review.                  Passed - In person appointment or virtual visit in the past 6 months       Recent Outpatient Visits              4 days ago     [address] [place] [address]    Office Visit    2 weeks ago     [address] [place] [address]    Office Visit    2 weeks ago Essential hypertension    Charissa Peguero MD    Office Visit    2 weeks ago     [address] [place] [address]    Office Visit    3 weeks ago     [address] [place] [address]    Office Visit     Future Appointments         Provider Department Appt Notes    In 4 days Tammy Hutchison, [address] [facility] Medicare A+B / Suppl    In 2 months Yoni Lerma MD [facility],[address], [place] MEDICARE  NON SX F/U               Passed - GFR > 50     No results found for: [facility]                  Recent Outpatient Visits              4 days ago     [address] [place] [address]    Office Visit    2 weeks ago     [address] [place] [address]    Office Visit    2 weeks ago Essential hypertension    Charissa Peguero MD    Office Visit    2 weeks ago     [address] [place] [address], [address] [place] [address] [place] Visit    3 weeks ago     [address] [place] [address]    Office Visit          Future Appointments Provider Department Appt Notes    In 4 days Roberto Solitario, [address] [facility] Medicare A+B / Suppl    In 2 months Adelia Irvin MD [address] [facility],[address], [place] MEDICARE  NON SX F/U

## 2022-09-15 NOTE — PATIENT INSTRUCTIONS
1) start the medrol dosepak. Do not take either celebrex or naproxen while you are taking the medrol dosepak. 2) Once you have finished the medrol dosepak you can start naproxen in the morning and at night time for 5 nights, and then decrease to once in the morning. 3) have the MRI of your left shoulder.

## 2022-09-15 NOTE — TELEPHONE ENCOUNTER
Patient is calling in stating [facility] has not received the script for [facility]

## 2022-09-17 DIAGNOSIS — K21.9 GASTROESOPHAGEAL REFLUX DISEASE: ICD-10-CM

## 2022-09-20 RX ORDER — NORETHINDRONE 0.35 MG
KIT ORAL
Qty: 84 TABLET | Refills: 0 | Status: SHIPPED | OUTPATIENT
Start: 2022-09-20 | End: 2022-09-20

## 2022-09-20 RX ORDER — LANSOPRAZOLE 30 MG/1
CAPSULE, DELAYED RELEASE ORAL
Qty: 90 CAPSULE | Refills: 0 | Status: SHIPPED | OUTPATIENT
Start: 2022-09-20 | End: 2022-12-20

## 2022-09-20 NOTE — TELEPHONE ENCOUNTER
Refill passed per [place] [employer] protocol.   Requested Prescriptions   Pending Prescriptions Disp Refills    SHAROBEL 0.35 MG Oral Tab [Pharmacy Med Name: Sharobel 0.35 Mg Tab [place]] 84 tablet 0     Sig: TAKE ONE TABLET BY MOUTH ONE TIME DAILY        Gynecology Medication Protocol Failed - 9/17/2022  8:15 AM        Failed - Pass dependent on manual look-up of last PAP and patient compliance with PAP follow up recommendations        Passed - In person appointment or virtual visit in the past 12 mos or appointment in next 3 mos       Recent Outpatient Visits              5 days ago Osteoarthritis of left glenohumeral joint    [facility] Yamilet Meza DO    Office Visit    2 weeks ago     [address] [place] [address]    Office Visit    3 weeks ago     [address] [place] [address]    Office Visit    1 month ago     [address] [place] [address]    Office Visit    1 month ago Essential hypertension    [address], [address], Andres Schwab MD    Office Visit     Future Appointments         Provider Department Appt Notes    In 1 week ADO MRI RM1 (1.5T) [facility] [address] [place] [address]     In 2 months Ender Woodward, [address], [address], [place] MEDICARE  NON SX F/U                  LANSOPRAZOLE 30 MG Oral Capsule Delayed Release [Pharmacy Med Name: Lansoprazole Dr [address] Haylee Rivera 90 capsule 0     Sig: TAKE ONE CAPSULE BY MOUTH EVERY MORNING BEFORE BREAKFAST        Gastrointestional Medication Protocol Passed - 9/17/2022  8:15 AM        Passed - In person appointment or virtual visit in the past 12 mos or appointment in next 3 mos       Recent Outpatient Visits              5 days ago Osteoarthritis of left glenohumeral joint    [facility] Yamilet Meza DO    Office Visit    2 weeks ago     [address] [place], [place]    Office Visit    3 weeks ago     [address] [place] [address]    Office Visit    1 month ago     [address] [place] [address]    Office Visit    1 month ago Essential hypertension    [address], [address], Teresa Wheeler MD    Office Visit     Future Appointments         Provider Department Appt Notes    In 1 week ADO MRI RM1 (1.5T) [facility] [address] [place] [address]     In 2 months Maggie Zavala MD [address], [address], [place] MEDICARE  NON SX F/U                   Recent Outpatient Visits              5 days ago Osteoarthritis of left glenohumeral joint    [address] [facility], [place]-Physiatry Beth Camacho DO    Office Visit    2 weeks ago     [address] [place], [place]    Office Visit    3 weeks ago     [address] [place] [address] [place] Visit    1 month ago     [facility]    Office Visit    1 month ago Essential hypertension    Pat Ricketts MD    Office Visit          Future Appointments         Provider Department Appt Notes    In 1 week ADO MRI RM1 (1.5T) [facility] [address] [place] [address]     In 2 months Maggie Zavala MD [address], [address], [place] MEDICARE  NON SX F/U

## 2022-10-03 NOTE — TELEPHONE ENCOUNTER
Pt was unable to complete MRI, states she was told she was moving too much. Pt would like to have have smaller dose of valium for her MRI.  She is requesting 2.5 mg of valium

## 2022-10-03 NOTE — TELEPHONE ENCOUNTER
Pt called and had MRI done 9/29/22 and images were not good. Has to retake, needs valium refill and please lower dose. Please to call to advise.

## 2022-10-08 NOTE — TELEPHONE ENCOUNTER
Patient is requesting refill of medication SHAROBEL 0.35 MG Oral Tab for end of November.  Patient states this one has a lower copay cost.     [address] [facility] [address] [place]

## 2022-10-18 NOTE — TELEPHONE ENCOUNTER
Patient has been scheduled for Caudal epidural steroid injection under fluoroscopy  on 11/21/2022 at the  [facility] with Dr. Sharmin Talavera. -Anesthesia type: IVCS. -If scheduling [facility] covid testing required for all procedures whether patient is vaccinated or not. -Patient informed not to eat or drink anything after midnight the night prior to the procedure, if being sedated. -Patient was advised that if he/she does receive the covid vaccine it needs to be at least 2 weeks before or after the injection. -Medications and allergies reviewed. -Patient reminded to hold NSAIDs (Ibuprofen, ASA 81, Aleve, Naproxen, Mobic, Diclofenac, Etodolac, Celebrex etc.) for 3 days prior to [facility]  if BMI is greater than 35. For Cervical injections only hold multivitamins, Vitamin E, Fish Oil, Phentermine (Lomaira) for 7 days prior to injection and NSAIDS.  mg to be held for 7 days prior to injections.  -If patient is receiving MAC/IVCS Phentermine Valentine Or) will need to be held for 7 days prior to injection.  -If on blood thinner clearance has been received to hold this medication by provider. Pt ordered to hold ASA 81mg for 7Days prior to injection. Pt states she is taking as a preventative for High B/P  Pt will also stop taking Phentermine for 7 days prior and Naproxen for 3 days prior.   -Patient informed he/she will need a  to and from procedure. -[facility] is located in the [facility] 1st floor. Patient may park in the yellow parking. Patient verbalized understanding and agrees with plan.  -----> Scheduled in Epic: Yes  -----> Scheduled in Caset[employer]:  Yes

## 2022-10-18 NOTE — PROCEDURES
Dx: left glenohumeral joint osteoarthritis  Procedure: left glenohumeral joint steroid injection under US guidance    The patient is here for a left shoulder injection done under ultrasound guidance. Under ultrasound guidance the left posterior glenohumeral joint was identified and a jazmine was placed on the patient's skin. The skin was cleaned with alcohol swabs x 3 and anesthetized with ethyl chloride spray. Then a 25 gauge needle was inserted under ultrasound guidance into the left posterior glenohumeral joint. Aspiration was performed and no blood, fluid, or air was aspirated. The patient was then injected with 4 ml of 1 ml of 40 mg of Kenalog/ml and 3 ml of 1% lidocaine without epinephrine. The needle was removed and a band aid was applied. These images are permanently stored in the ultrasound unit or PACS system.     Aria Alexandre DO  Physical Medicine and [facility] [facility] [address]

## 2022-10-18 NOTE — TELEPHONE ENCOUNTER
Per Medicare Guidelines -no authorization is required for charli    Status: Authorization is not required however may be subject to review once claim is submitted-Covered Benefit    Clinical staff can proceed with scheduling Caudal epidural steroid injection under fluoroscopy CPT 75302     AND    Per Medicare guidelines-no authorization required for Lidocaine/Kenalog injections in office    Left glenohumeral steroid injection under US guidance CPT 15600+    Status: Authorization is not required-Covered Benefit

## 2022-10-20 NOTE — TELEPHONE ENCOUNTER
Refill passed per [facility] protocol   Requested Prescriptions   Pending Prescriptions Disp Refills    AMLODIPINE 5 MG Oral Tab [Pharmacy Med Name: Amlodipine Besylate 5 Mg Tab Unic] 90 tablet 0     Sig: TAKE ONE TABLET BY MOUTH ONE TIME DAILY        Hypertensive Medications Protocol Passed - 10/19/2022 11:34 PM        Passed - In person appointment in the past 12 or next 3 months       Recent Outpatient Visits              Yesterday Osteoarthritis of left glenohumeral joint    [address] [facility] [place], Celeste Lesches,     Office Visit    1 month ago Osteoarthritis of left glenohumeral joint    [address] [facility] [place], Celeste Lesches, DO    Office Visit    1 month ago     [address], [address]    Office Visit    1 month ago     [address], [address]    Office Visit    2 months ago     [address], [address]    Office Visit     Future Appointments         Provider Department Appt Notes    In 1 month Jamilah Cheung DO [facility] Caudal epidural steroid injection under fluoroscopy, IVCS    In 3 months Rhonda Carr MD [facility], [address] [facility],[address] Floor, [place] MEDICARE  NON SX F/U               Passed - Last BP reading less than 140/90     BP Readings from Last 1 Encounters:  10/18/22 : 134/72                Passed - CMP or BMP in past 6 months     Recent Results (from the past 4392 hour(s))   COMP METABOLIC PANEL (14)    Collection Time: 07/28/22  2:05 PM   Result Value Ref Range    Glucose 93 70 - 99 mg/dL    Sodium 143 136 - 145 mmol/L    Potassium 3.7 3.5 - 5.1 mmol/L    Chloride 110 98 - 112 mmol/L    CO2 25.0 21.0 - 32.0 mmol/L    Anion Gap 8 0 - 18 mmol/L    BUN 16 7 - 18 mg/dL    Creatinine 0.98 0.55 - 1.02 mg/dL    BUN/CREA Ratio 16.3 10.0 - 20.0    Calcium, Total 9.5 8.5 - 10.1 mg/dL    Calculated Osmolality 297 (H) 275 - 295 mOsm/kg    GFR, Non- 57 (L) >=60    GFR, -American 65 >=60    ALT 27 13 - 56 U/L    AST 15 15 - 37 U/L    Alkaline Phosphatase 71 55 - 142 U/L    Bilirubin, Total 0.4 0.1 - 2.0 mg/dL    Total Protein 7.3 6.4 - 8.2 g/dL    Albumin 3.7 3.4 - 5.0 g/dL    Globulin  3.6 2.8 - 4.4 g/dL    A/G Ratio 1.0 1.0 - 2.0    Patient Fasting for CMP? Yes      *Note: Due to a large number of results and/or encounters for the requested time period, some results have not been displayed. A complete set of results can be found in Results Review.                  Passed - In person appointment or virtual visit in the past 6 months       Recent Outpatient Visits              Yesterday Osteoarthritis of left glenohumeral joint    [address] [facility] Tino Dunbar, DO    Office Visit    1 month ago Osteoarthritis of left glenohumeral joint    [address] [facility] Tino Dunbar, DO    Office Visit    1 month ago     [address] [place] [address]    Office Visit    1 month ago     [address] [place] [address] Visit    2 months ago     [address] [place] [address]    Office Visit     Future Appointments         Provider Department Appt Notes    In 1 month Tino Dunbar, DO [facility] Caudal epidural steroid injection under fluoroscopy, IVCS    In 3 months Nena Nails MD [facility], [address] [place] [address],[address], [place] MEDICARE  NON SX F/U               Passed - [facility] or GFRAA > 50     GFR Evaluation  GFRAA: 72 , resulted on 7/28/2022

## 2022-11-03 NOTE — TELEPHONE ENCOUNTER
Refill Request    Medication request: Naproxen 500mg oral tab EC    LOV:10/18/2022 Montrell Hernandez DO   RTC: Post injection  NOV: Visit date not found      [place]: 9/16/22 #79    UDS: (if applicable): None  Pain contract: None    LOV plan (if weaning or changing medications): Continue Naproxen 500mg BID

## 2022-11-19 NOTE — TELEPHONE ENCOUNTER
Refill passed per [address] protocol. Requested Prescriptions   Pending Prescriptions Disp Refills    LOSARTAN 50 MG Oral Tab [Pharmacy Med Name: Losartan Potassium 50 Mg Tab [facility]] 90 tablet 0     Sig: Take 1 tablet (50 mg total) by mouth daily.        Hypertensive Medications Protocol Passed - 11/19/2022  1:31 AM        Passed - In person appointment in the past 12 or next 3 months     Recent Outpatient Visits              1 month ago Osteoarthritis of left glenohumeral joint    [address] [facility] Alfa Villalpando, DO    Office Visit    2 months ago Osteoarthritis of left glenohumeral joint    [address] [facility] Sheila Odonnell DO    Office Visit    2 months ago     [address] [place] [address]    Office Visit    2 months ago     [address] [place] [address]    Office Visit    3 months ago     [address] [place] [address]    Office Visit          Future Appointments         Provider Department Appt Notes    In 2 days Sheila Odonnell DO [facility] Caudal epidural steroid injection under fluoroscopy, IVCS    In 2 months Anna Chinchilla MD [facility], [place], [place] MEDICARE  NON SX F/U               Passed - Last BP reading less than 140/90     BP Readings from Last 1 Encounters:  10/18/22 : 134/72              Passed - CMP or BMP in past 6 months     Recent Results (from the past 4392 hour(s))   COMP METABOLIC PANEL (14)    Collection Time: 07/28/22  2:05 PM   Result Value Ref Range    Glucose 93 70 - 99 mg/dL    Sodium 143 136 - 145 mmol/L    Potassium 3.7 3.5 - 5.1 mmol/L    Chloride 110 98 - 112 mmol/L    CO2 25.0 21.0 - 32.0 mmol/L    Anion Gap 8 0 - 18 mmol/L    BUN 16 7 - 18 mg/dL    Creatinine 0.98 0.55 - 1.02 mg/dL    BUN/CREA Ratio 16.3 10.0 - 20.0    Calcium, Total 9.5 8.5 - 10.1 mg/dL Calculated Osmolality 297 (H) 275 - 295 mOsm/kg    GFR, Non- 57 (L) >=60    GFR, -American 65 >=60    ALT 27 13 - 56 U/L    AST 15 15 - 37 U/L    Alkaline Phosphatase 71 55 - 142 U/L    Bilirubin, Total 0.4 0.1 - 2.0 mg/dL    Total Protein 7.3 6.4 - 8.2 g/dL    Albumin 3.7 3.4 - 5.0 g/dL    Globulin  3.6 2.8 - 4.4 g/dL    A/G Ratio 1.0 1.0 - 2.0    Patient Fasting for CMP? Yes      *Note: Due to a large number of results and/or encounters for the requested time period, some results have not been displayed. A complete set of results can be found in Results Review.                Passed - In person appointment or virtual visit in the past 6 months     Recent Outpatient Visits              1 month ago Osteoarthritis of left glenohumeral joint    [address] [facility] Barrera Harrison, DO    Office Visit    2 months ago Osteoarthritis of left glenohumeral joint    [address] [facility] Barrera Old, DO    Office Visit    2 months ago     [address] [place] [address]    Office Visit    2 months ago     [address] [place] [address] Visit    3 months ago     [address] [place] [address]    Office Visit          Future Appointments         Provider Department Appt Notes    In 2 days Holly Terry, DO [facility] Caudal epidural steroid injection under fluoroscopy, IVCS    In 2 months Amelia Handy MD [facility], [address] [place] [address],[address], [place] MEDICARE  NON SX F/U               Passed - [facility] or GFRAA > 50     GFR Evaluation  GFRAA: 72 , resulted on 7/28/2022

## 2022-11-21 NOTE — PROCEDURES
Caudal Epidural steroid injection under fluoroscopy    Dx: left lumbar radiculitis, history of lumbar fusion    Description: Risks and benefits discussed, patient agreed to proceed. Informed consent obtained prior to procedure. The patient was positioned prone on the fluoroscopy table with a pillow placed underneath the lower abdomen. Noninvasive monitors including pulse oximeter, blood pressure cuff and EKG leads were placed. A sterile prep with chloroprep and drape at the site of injection was performed. The C arm was oriented to see the lateral view. Landmarks were identifuied using fluoroscopy. Next, using lidocaine 1% solution the soft tissue was infiltrated at the site of intended needle puncture. A 22 gauge Quincke spinal needle was introduced into the caudal canal using fluoroscopic guidance. AP/lateral fluoroscopy was used to verify positioning. The stylet was removed and after negative aspiration for blood and CSF radiopaque dye was injected which demonstrated epidural spread without vascular spread. Subsequently, 80mg kenalog with 8mL of normal saline was injected after negative aspiration. The needle was then withdrawn. Recovery: Patient was monitored in recovery with regular VS, pulse ox monitoring. Patent was moving all extremities and able to ambulate, and patient was given discharge instructions and discharge in care of a family member/friend in stable condition. Follow up in 3-4 weeks for post-procedure evaluation. IV conscious sedation was utilized for situational anxiety; a total of 2mg of versed and 100mcg of fentanyl was administered over 12 minutes.      Tim Smith DO  Physical Medicine and [address] [place] [address]

## 2022-12-10 NOTE — TELEPHONE ENCOUNTER
Patient requesting refill for     FLUTICASONE PROPIONATE 50 MCG/ACT Nasal Suspension spray 2 sprays in each nostril daily Disp: 16 g Rfl: 1     This was sent to incorrect doctor.      Please advise ASAP 2

## 2022-12-20 NOTE — TELEPHONE ENCOUNTER
Please review. Protocol failed or has no protocol. Requested Prescriptions   Pending Prescriptions Disp Refills    CLINDAMYCIN 1 % External Lotion [Pharmacy Med Name: Clindamycin Phosphate 1 % Lot Foug] 60 mL 0     Sig: APPLY ONE APPLICATION TOPICALLY TWICE DAILY.        There is no refill protocol information for this order      Signed Prescriptions Disp Refills    lansoprazole 30 MG Oral Capsule Delayed Release 90 capsule 1     Sig: TAKE ONE CAPSULE BY MOUTH EVERY MORNING BEFORE BREAKFAST       Gastrointestional Medication Protocol Passed - 12/16/2022  8:59 PM        Passed - In person appointment or virtual visit in the past 12 mos or appointment in next 3 mos     Recent Outpatient Visits              4 weeks ago Left lumbar radiculitis    [facility], DO    Office Visit    2 months ago Osteoarthritis of left glenohumeral joint    [address] [facility], DO    Office Visit    3 months ago Osteoarthritis of left glenohumeral joint    [address] [facility], DO    Office Visit    3 months ago     [address] [place] [address] Visit    3 months ago     [address] [place] [address]    Office Visit          Future Appointments         Provider Department Appt Notes    In 2 days [facility], [facility] right arm pain    In 1 month Maine Knott MD [address] [facility],2[address] Floor, [place] MEDICARE  NON SX F/U                    Recent Outpatient Visits              4 weeks ago Left lumbar radiculitis    [facility], DO    Office Visit    2 months ago Osteoarthritis of left glenohumeral joint    [address] [facility], DO    Office Visit    3 months ago Osteoarthritis of left glenohumeral joint    [facility] [place] Regla Kam DO    Office Visit    3 months ago     [address], [place]    Office Visit    3 months ago     [address] [place] [address]    Office Visit            Future Appointments         Provider Department Appt Notes    In 2 days Regla Kam, [facility] [place] right arm pain    In 1 month Jeremy Gramajo MD [address] [place] [address], [address], [place] MEDICARE  NON SX F/U

## 2022-12-20 NOTE — TELEPHONE ENCOUNTER
Refill passed per [facility] protocol. Requested Prescriptions   Pending Prescriptions Disp Refills    LANSOPRAZOLE 30 MG Oral Capsule Delayed Release [Pharmacy Med Name: Lansoprazole  Norman TRISTENbritney Williamjaylin 90 capsule 0     Sig: TAKE ONE CAPSULE BY MOUTH EVERY MORNING BEFORE BREAKFAST       Gastrointestional Medication Protocol Passed - 12/16/2022  8:59 PM        Passed - In person appointment or virtual visit in the past 12 mos or appointment in next 3 mos     Recent Outpatient Visits              4 weeks ago Left lumbar radiculitis    [facility] Mechelle Us,     Office Visit    2 months ago Osteoarthritis of left glenohumeral joint    [address] [facility] Mechelle Us, DO    Office Visit    3 months ago Osteoarthritis of left glenohumeral joint    [address] [facility] Mechelle Us, DO    Office Visit    3 months ago     [address] [place] [address]    Office Visit    3 months ago     [address] [place] [address]    Office Visit          Future Appointments         Provider Department Appt Notes    In 2 days Mechelle Us, [address] [facility] right arm pain    In 1 month Halima Soares MD [facility], [address] [facility],[address], [place] MEDICARE  NON SX F/U                 CLINDAMYCIN 1 % External [facility] Med Name: Clindamycin Phosphate 1 % Lot Foug] 60 mL 0     Sig: APPLY ONE APPLICATION TOPICALLY TWICE DAILY.        There is no refill protocol information for this order           Recent Outpatient Visits              4 weeks ago Left lumbar radiculitis    [facility] Mechelle Us DO    Office Visit    2 months ago Osteoarthritis of left glenohumeral joint    [address] [facility] Mechelle Us, [place]    Office Visit    3 months ago Osteoarthritis of left glenohumeral joint    [address] [facility] [place] Jabari Sanchez DO    Office Visit    3 months ago     [address] [place] [address]    Office Visit    3 months ago     [address] [place], [place]    Office Visit            Future Appointments         Provider Department Appt Notes    In 2 days Jabari Sanchez, [facility] right arm pain    In 1 month Haven Sutton MD [address], [address], [place] MEDICARE  NON SX F/U

## 2022-12-22 NOTE — PATIENT INSTRUCTIONS
Stop the naproxen while taking the medrol dosepack. If the symptoms in the right arm have not gotten better once you have finished the medication please let me know.

## 2023-01-26 NOTE — TELEPHONE ENCOUNTER
Refill Request    Medication request: gabapentin 300 MG Oral Cap  Take 2 capsules (600 mg total) by mouth in the morning, at noon, and at bedtime. LOV: 12/22/22  Due back to clinic per last office note:  \"Return if symptoms worsen or fail to improve. \"  NOV: Visit date not found      [place] 11/30/22 #360- 60 day supply    Urine drug screen (if applicable): n/a  Pain contract: none    LOV plan (if weaning or changing medications): Per  at LOV: \"Takes naproxen PRN. Gabapentin TID. \" No mention of any changes to the Gabapentin.

## 2023-02-21 NOTE — TELEPHONE ENCOUNTER
Refill passed per [facility] protocol   Requested Prescriptions   Pending Prescriptions Disp Refills    FLUTICASONE PROPIONATE 50 MCG/ACT Nasal Suspension [Pharmacy Med Name: Fluticasone Propionate 50 Mcg/Act Spr Hikm] 48 g 0     Sig: SPRAY TWICE IN EACH NOSTRIL DAILY       Allergy Medication Protocol Passed - 2/20/2023  3:11 PM        Passed - In person appointment or virtual visit in the past 12 mos or appointment in next 3 mos     Recent Outpatient Visits              1 month ago Essential hypertension    Delroy Sim MD    Office Visit    2 months ago Radiculitis of right cervical region    [address], [address], Jerald Barajas, DO    Office Visit    3 months ago Left lumbar radiculitis    [facility] Elizabeth Howell, DO    Office Visit    4 months ago Osteoarthritis of left glenohumeral joint    [facility], [address], [place] Elizabeth Howell,     Office Visit    5 months ago Osteoarthritis of left glenohumeral joint    [facility], [address], [place] Elizabeth Howell, DO    Office Visit          Future Appointments         Provider Department Appt Notes    In 2 months Shivani Vargas MD [facility], [address], [place] MEDICARE  NON SX F/U

## 2023-03-09 NOTE — TELEPHONE ENCOUNTER
Refill Request    Medication request: NAPROXEN 500 MG Oral Tab  Take 1 tablet by mouth 2 times daily as needed    LOV:Visit date not found   Due back to clinic per last office note:  Return if symptoms worsen or fail to improve. NOV: Visit date not found      [place]: 11/3/22 #30    Urine drug screen (if applicable): jermaine  Pain contract: jermaine    LOV plan (if weaning or changing medications): Per Dr. Dayne Stevens  \" Has been taking naproxen 500 mg on an as-needed basis only. \"    Please review and sign if appropriate

## 2023-03-21 NOTE — TELEPHONE ENCOUNTER
NO PAP SMEAR   Please review; protocol failed/no protocol.      Requested Prescriptions   Pending Prescriptions Disp Refills    SHAROBEL 0.35 MG Oral Tab [Pharmacy Med Name: Sharobel 0.35 Mg Tab Nort] 84 tablet 0     Sig: TAKE ONE TABLET BY MOUTH ONE TIME DAILY       Gynecology Medication Protocol Failed - 3/19/2023  8:40 PM        Failed - Pass dependent on manual look-up of last PAP and patient compliance with PAP follow up recommendations        Passed - In person appointment or virtual visit in the past 12 mos or appointment in next 3 mos     Recent Outpatient Visits              2 months ago Essential hypertension    Abiel Carney MD    Office Visit    2 months ago Radiculitis of right cervical region    [facility], [address], JennLatrice odonnelle, DO    Office Visit    3 months ago Left lumbar radiculitis    [facility] Chin Husbands, DO    Office Visit    5 months ago Osteoarthritis of left glenohumeral joint    [facility], [address], [facility], DO    Office Visit    6 months ago Osteoarthritis of left glenohumeral joint    [facility], [address], JennNichole odonnellroe, DO    Office Visit          Future Appointments         Provider Department Appt Notes    In 1 month Remi Pate MD [facility], [address], [place] MEDICARE  NON SX F/U                     Recent Outpatient Visits              2 months ago Essential hypertension    [address] [place] [address], Chris Alvarenga MD    Office Visit    2 months ago Radiculitis of right cervical region    [facility], [address], Jerald Barajas, DO    Office Visit    3 months ago Left lumbar radiculitis    [facility] Chin Husbands, DO    Office Visit    5 months ago Osteoarthritis of left glenohumeral joint    [facility], [address], Emmy Olvera DO    Office Visit    6 months ago Osteoarthritis of left glenohumeral joint    [facility], [address], [place] Lara Buckley, [place]    Office Visit             Future Appointments         Provider Department Appt Notes    In 1 month Jaci Alves MD [facility], [address], [place] MEDICARE  NON SX F/U

## 2023-04-04 NOTE — TELEPHONE ENCOUNTER
Per Medicare Guidelines -no authorization is required for Caudal epidural steroid injection under fluoroscopy CPT 88913     Status: Authorization is not required however may be subject to review once claim is submitted-Covered Benefit    Fyi, per CMS LCD limitations-Use of Moderate or Deep Sedation, General Anesthesia, or Monitored Anesthesia Care (MAC) is usually unnecessary or rarely indicated for these procedures and therefore, is not considered medically reasonable and necessary. Even in patients with a needle phobia and anxiety, typically oral anxiolytics suffice. In exceptional and unique cases, documentation must clearly establish the need for such sedation in the specific patient.       Per Dr. Lorna Parks w/ IVCS

## 2023-04-05 NOTE — TELEPHONE ENCOUNTER
Patient has been scheduled for Caudal epidural steroid injection under fluoroscopy on 4/11/2023 at the [facility] with Dr. Jayshree Arshad. -Anesthesia type: IVCS. -If scheduling [address] covid testing required for all procedures whether patient is vaccinated or not. -Patient informed not to eat or drink anything after midnight the night prior to the procedure, if being sedated. (Patient informed to fast 12 hours prior to procedure with IVCS/MAC. )  -Patient was advised that if he/she does receive the covid vaccine it needs to be at least 2 weeks before or after the injection. -Medications and allergies reviewed. -Patient reminded to hold NSAIDs (Ibuprofen, ASA 81, Aleve, Naproxen, Mobic, Diclofenac, Etodolac, Celebrex etc.) for 3 days prior to [facility]  if BMI is greater than 35. For Cervical injections only hold multivitamins, Vitamin E, Fish Oil, Phentermine (Lomaira) for 7 days prior to injection and NSAIDS.  mg to be held for 7 days prior to injections.  -If patient is receiving MAC/IVCS Phentermine Anatoliy Fantasma) will need to be held for 7 days prior to injection.  -If on blood thinner clearance has been received to hold this medication by provider.   -Patient informed he/she will need a  to and from procedure. -[facility] is located in the [facility] 1st floor. Patient may park in the yellow or purple parking. Patient verbalized understanding and agrees with plan.  -----> Scheduled in Epic: Yes  -----> Scheduled in [employer]:  Yes

## 2023-04-11 NOTE — PROCEDURES
Caudal Epidural steroid injection under fluoroscopy    Dx: lumbar stenosis, history of lumbar fusion    Description: Risks and benefits discussed, patient agreed to proceed. Informed consent obtained prior to procedure. The patient was positioned prone on the fluoroscopy table with a pillow placed underneath the lower abdomen. Noninvasive monitors including pulse oximeter, blood pressure cuff and EKG leads were placed. A sterile prep with chloroprep and drape at the site of injection was performed. The C arm was oriented to see the lateral view. Landmarks were identifuied using fluoroscopy. Next, using lidocaine 1% solution the soft tissue was infiltrated at the site of intended needle puncture. A 22 gauge Quincke spinal needle was introduced into the caudal canal using fluoroscopic guidance. AP/lateral fluoroscopy was used to verify positioning. The stylet was removed and after negative aspiration for blood and CSF radiopaque dye was injected which demonstrated epidural spread without vascular spread. Subsequently, 80mg kenalog with 8mL of normal saline was injected after negative aspiration. The needle was then withdrawn. Recovery: Patient was monitored in recovery with regular VS, pulse ox monitoring. Patent was moving all extremities and able to ambulate, and patient was given discharge instructions and discharge in care of a family member/friend in stable condition. Follow up in 3-4 weeks for post-procedure evaluation. IV conscious sedation was utilized for this procedure. A total of 2mg of versed and 100mcg of fentanyl was administered over 10 minutes.      Karolina Market DO  Physical Medicine and [address] [place] [address]

## 2023-04-13 NOTE — TELEPHONE ENCOUNTER
Protocol failed or has No Protocol, please review  Requested Prescriptions   Pending Prescriptions Disp Refills    AMLODIPINE 5 MG Oral Tab [Pharmacy Med Name: Amlodipine Besylate 5 Mg Tab Unic] 90 tablet 0     Sig: Take 1 tablet by mouth daily. Hypertensive Medications Protocol Failed - 4/13/2023  1:31 AM        Failed - CMP or BMP in past 6 months     No results found for this or any previous visit (from the past 4392 hour(s)).               Failed - In person appointment or virtual visit in the past 6 months     Recent Outpatient Visits              2 days ago Lumbar stenosis with neurogenic claudication    [facility] Silver Serge, DO    Office Visit    1 week ago Osteoarthritis of left glenohumeral joint    [facility], [address], [place] Maxime Serge, DO    Office Visit    2 months ago Essential hypertension    [facility]    Office Visit    3 months ago Radiculitis of right cervical region    [address], [address], Jerald Prado, DO    Office Visit    4 months ago Left lumbar radiculitis    [facility] Silver Serge, DO    Office Visit          Future Appointments         Provider Department Appt Notes    In 1 month yRan Oreilly MD [address], [address] [facility],[address], [place] MEDICARE  NON SX F/U               Passed - In person appointment in the past 12 or next 3 months     Recent Outpatient Visits              2 days ago Lumbar stenosis with neurogenic claudication    [facility] Silver Serge, DO    Office Visit    1 week ago Osteoarthritis of left glenohumeral joint    [facility], [address], [place] Silver Serge, DO    Office Visit    2 months ago Essential hypertension    [address] [place], [place] [address]Tramaine MD    Office Visit    3 months ago Radiculitis of right cervical region    [address], [place],     Office Visit    4 months ago Left lumbar radiculitis    [facility] Concepcion Long, DO    Office Visit          Future Appointments         Provider Department Appt Notes    In 1 month [facility], [place] MEDICARE  NON SX F/U               Passed - Last BP reading less than 140/90     BP Readings from Last 1 Encounters:  04/04/23 : 128/62                Passed - EGFRCR or GFRAA > 50     GFR Evaluation  GFRAA: 65 , resulted on 7/28/2022               Future Appointments         Provider Department Appt Notes    In 1 month Adelia Irvin MD [address], [place] MEDICARE  NON SX F/U          Recent Outpatient Visits              2 days ago Lumbar stenosis with neurogenic claudication    [facility] Concepcion Long, DO    Office Visit    1 week ago Osteoarthritis of left glenohumeral joint    [facility], [place],     Office Visit    2 months ago Essential hypertension    [address] [facility], Daymon Gaucher, MD    Office Visit    3 months ago Radiculitis of right cervical region    [address], [place], [place]    Office Visit    4 months ago Left lumbar radiculitis    [facility] Concepcion Long, DO    Office Visit

## 2023-05-06 NOTE — TELEPHONE ENCOUNTER
Refill passed per [place] [facility] protocol    Requested Prescriptions   Pending Prescriptions Disp Refills    LEVOCETIRIZINE 5 MG Oral Tab [Pharmacy Med Name: Levocetirizine Dihydrochloride 5 Mg Tab Teva] 90 tablet 0     Sig: TAKE ONE TABLET BY MOUTH EVERY DAY AT BEDTIME       Allergy Medication Protocol Passed - 5/6/2023  1:31 AM        Passed - In person appointment or virtual visit in the past 12 mos or appointment in next 3 mos     Recent Outpatient Visits              3 weeks ago Lumbar stenosis with neurogenic claudication    [facility] Mechelle Us, DO    Office Visit    1 month ago Osteoarthritis of left glenohumeral joint    [facility], [address], [place] Mechelle Us,     Office Visit    3 months ago Essential hypertension    Sally Travis, [place] MD    Office Visit    4 months ago Radiculitis of right cervical region    [facility], [address], Jerald Barajas,     Office Visit    5 months ago Left lumbar radiculitis    [facility] Mechelle Us, DO    Office Visit          Future Appointments         Provider Department Appt Notes    In 1 week Halima Soares MD [facility], [address], [place] MEDICARE  NON SX F/U

## 2023-05-23 NOTE — TELEPHONE ENCOUNTER
Please review. Protocol failed / Has no protocol. Requested Prescriptions   Pending Prescriptions Disp Refills    HYDROCHLOROTHIAZIDE 12.5 MG Oral Tab [Pharmacy Med Name: Hydrochlorothiazide 12.5 Mg Tab Acta] 90 tablet 0     Sig: TAKE ONE TABLET BY MOUTH ONE TIME DAILY       Hypertensive Medications Protocol Failed - 5/23/2023  1:30 AM        Failed - CMP or BMP in past 6 months     No results found for this or any previous visit (from the past 4392 hour(s)).               Failed - In person appointment or virtual visit in the past 6 months     Recent Outpatient Visits              1 week ago Essential hypertension    Simone Kaplan MD    Office Visit    1 month ago Lumbar stenosis with neurogenic claudication    [facility] Sheila Odonnell DO    Office Visit    1 month ago Osteoarthritis of left glenohumeral joint    [facility], [address], [place] Sheila Odonnell DO    Office Visit    4 months ago Essential hypertension    Simone Kaplan MD    Office Visit    5 months ago Radiculitis of right cervical region    [address], [address], [place] Barajas, DO    Office Visit          Future Appointments         Provider Department Appt Notes    In 3 months [facility] OF [address] 5/31/22    In 4 months Anna Chinchilla MD [address], [address], [place] MEDICARE  NON SX F/U               Passed - In person appointment in the past 12 or next 3 months     Recent Outpatient Visits              1 week ago Essential hypertension    [address], [address] [facility], Simone Reyes MD    Office Visit    1 month ago Lumbar stenosis with neurogenic claudication    [facility] Sheila Odonnell DO    Office Visit    1 month ago Osteoarthritis of left glenohumeral joint    [facility], [address], [place], [place]    Office Visit    4 months ago Essential hypertension    Zari Navarro MD    Office Visit    5 months ago Radiculitis of right cervical region    [facility], [address], [place], [place], [place]    Office Visit          Future Appointments         Provider Department Appt Notes    In 3 months [address] 5/31/22    In 4 months Rhonda Carr MD [facility], [address], [place] MEDICARE  NON SX F/U               Passed - Last BP reading less than 140/90     BP Readings from Last 1 Encounters:  05/16/23 : 122/70                Passed - EGFRCR or GFRAA > 50     GFR Evaluation  GFRAA: 65 , resulted on 7/28/2022               Future Appointments         Provider Department Appt Notes    In 3 months Christopher [address] [facility] [address] 5/31/22    In 4 months Mary Breeze, MD [facility] Boas, [address], [place] MEDICARE  NON [address] F/U           Recent Outpatient Visits              1 week ago Essential hypertension    Zari Navarro MD    Office Visit    1 month ago Lumbar stenosis with neurogenic claudication    [facility] Jamilah Cheung, DO    Office Visit    1 month ago Osteoarthritis of left glenohumeral joint    [facility], [address], [place], [place]    Office Visit    4 months ago Essential hypertension    Zari Navarro MD    Office Visit    5 months ago Radiculitis of right cervical region    [facility], [address], Jerald Barajas, [address] [place] [address] Visit

## 2023-06-05 NOTE — TELEPHONE ENCOUNTER
Please review. Protocol failed / No protocol. Requested Prescriptions   Pending Prescriptions Disp Refills    LOSARTAN 50 MG Oral Tab [Pharmacy Med Name: Losartan Potassium 50 Mg Tab [facility]] 90 tablet 0     Sig: Take 1 tablet (50 mg total) by mouth daily. Hypertensive Medications Protocol Failed - 6/3/2023 10:21 PM        Failed - CMP or BMP in past 6 months     No results found for this or any previous visit (from the past 4392 hour(s)).               Failed - In person appointment or virtual visit in the past 6 months     Recent Outpatient Visits              2 weeks ago Essential hypertension    [place] Jake Hernadez MD    Office Visit    1 month ago Lumbar stenosis with neurogenic claudication    [facility] Whitney Maki DO    Office Visit    2 months ago Osteoarthritis of left glenohumeral joint    [facility], [address], [place] Whitney Maki DO    Office Visit    4 months ago Essential hypertension    [place] Jake Hernadez MD    Office Visit    5 months ago Radiculitis of right cervical region    [address], [address] [facility],[address], Gianni Barajas [place],     Office Visit          Future Appointments         Provider Department Appt Notes    In 2 months Christopher [address] [place] [address] 5/31/22    In 3 months Moy High DO [address], [address], [place] px (policy informed)    In 4 months Emily Blackwood MD [address], [address], [place] MEDICARE  NON SX F/U               Passed - In person appointment in the past 12 or next 3 months     Recent Outpatient Visits              2 weeks ago Essential hypertension    Price Fairbanks MD    Office Visit    1 month ago Lumbar stenosis with neurogenic claudication    [facility] Whitney Maki, DO    Office Visit    2 months ago Osteoarthritis of left glenohumeral joint    [facility], [address], [place] Jerald Villalpando, DO    Office Visit    4 months ago Essential hypertension    Beena Seals MD    Office Visit    5 months ago Radiculitis of right cervical region    [address], [address], [place], [place]    Office Visit          Future Appointments         Provider Department Appt Notes    In 2 months [facility] OF [facility] [address] 5/31/22    In 3 months Ryann Garcia DO [address], [address], [address] px (policy informed)    In 4 months Diego Bailey MD [facility], [address], [place] MEDICARE  NON SX F/U               Passed - Last BP reading less than 140/90     BP Readings from Last 1 Encounters:  05/16/23 : 122/70                Passed - EGFRCR or GFRAA > 50     GFR Evaluation  GFRAA: 65 , resulted on 7/28/2022                Future Appointments         Provider Department Appt Notes    In 2 months [facility] [address] 5/31/22    In 3 months Ryann Garcia DO [address], [address], [address] px (policy informed)    In 4 months Diego Bailey MD [address], [address], [place] MEDICARE  NON SX F/U           Recent Outpatient Visits              2 weeks ago Essential hypertension    Bianca Grimm [facility] MD El[place]er    Office Visit    1 month ago Lumbar stenosis with neurogenic claudication    [facility] Radha Cope,     Office Visit    2 months ago Osteoarthritis of left glenohumeral joint    [facility], [address], [place] Radha Cope,     Office Visit    4 months ago Essential hypertension    [address], [address] [facility],[address], Berna Latham MD    Office Visit    5 months ago Radiculitis of right cervical region    Sharri Miller, [address], [place], [place]    Office Visit

## 2023-07-11 NOTE — PATIENT INSTRUCTIONS
Please let me know how your right shoulder and the numbness and tingling are doing in 1-2 months - either MyChart message or telephone call.

## 2023-07-11 NOTE — TELEPHONE ENCOUNTER
Per CMS Guidelines -no authorization is required for Right glenohumeral joint steroid injection under US guidance CPT 09822,      Status: Authorization is not required however may be subject to review once claim is submitted-Covered Benefit     Inj done in office

## 2023-07-11 NOTE — PROCEDURES
Dx: right glenohumeral osteoarthritis  Procedure: right glenohumeral joint steroid injection under US guidance    The patient is here for a right shoulder injection done under ultrasound guidance. Under ultrasound guidance the right posterior glenohumeral joint was identified and a jazmine was placed on the patient's skin. The skin was cleaned with alcohol swabs x 3 and anesthetized with ethyl chloride spray. Then a 25 gauge needle was inserted under ultrasound guidance into the right posterior glenohumeral joint. Aspiration was performed and no blood, fluid, or air was aspirated. The patient was then injected with 4 ml of 1 ml of 40 mg of Kenalog/ml and 3 ml of 1% lidocaine without epinephrine. The needle was removed and a band aid was applied. These images are permanently stored in the ultrasound unit or PACS system.      Leonel Baum DO  Physical Medicine and [facility] [address] [place] [address]

## 2023-07-21 NOTE — TELEPHONE ENCOUNTER
Patient has the following physical appointment scheduled:    Future Appointments   Date Time Provider Patrick Andrews   8/22/2023 12:00 PM [facility] RM[facility]   9/7/2023  2:00 PM DO LISS Winter Asa   10/17/2023  3:15 PM Emily Blackwood MD [address] [place], [place] [address]Aidan for review.

## 2023-07-21 NOTE — TELEPHONE ENCOUNTER
Please review. Protocol failed. LOV was 5/2021 but has physical scheduled:  Future Appointments   Date Time Provider Patrick Andrews   8/22/2023 12:00 PM [facility] SYSTEM OF [facility] RM1 [facility] SYSTEM [facility] EM [facility] SYSTEM [facility]   9/7/2023  2:00 PM [facility]   10/17/2023  3:15 PM Jo Lea MD [address] [facility] SYSTEM OF THE [place]       Requested Prescriptions   Pending Prescriptions Disp Refills    amLODIPine 5 MG Oral Tab 90 tablet 0     Sig: Take 1 tablet (5 mg total) by mouth daily. Hypertensive Medications Protocol Failed - 7/21/2023 11:41 AM        Failed - CMP or BMP in past 6 months     No results found for this or any previous visit (from the past 4392 hour(s)).             Failed - In person appointment or virtual visit in the past 6 months     Recent Outpatient Visits              1 week ago Primary osteoarthritis of right shoulder    [facility], [address] [facility],[address], [place]    Office Visit    2 months ago Essential hypertension    [address] [facility], Otf Mora MD    Office Visit    3 months ago Lumbar stenosis with neurogenic claudication    [facility] Taj Soto DO    Office Visit    3 months ago Osteoarthritis of left glenohumeral joint    [facility], [address] [facility], [place] Taj Soto DO    Office Visit    6 months ago Essential hypertension    [address] [facility], Otf Mora MD    Office Visit          Future Appointments         Provider Department Appt Notes    In 1 month [facility] SYSTEM OF [facility] [address] [place] [address] 5/31/22    In 1 month Ronal Oconnell DO [address], [address], [address] px (policy informed)    In 2 months Jo Lea MD [address], [address], [place] MEDICARE  NON SX F/U               Passed - In person appointment in the past 12 or next 3 months     Recent Outpatient Visits              1 week ago Primary osteoarthritis of right shoulder    [facility], [address], [place] Siobhan Manley DO    Office Visit    2 months ago Essential hypertension    Verenice Main MD    Office Visit    3 months ago Lumbar stenosis with neurogenic claudication    [facility] Siobhan Manley,     Office Visit    3 months ago Osteoarthritis of left glenohumeral joint    [facility], [address], Jerald Prado DO    Office Visit    6 months ago Essential hypertension    Svetlana Bailey MD    Office Visit          Future Appointments         Provider Department Appt Notes    In 1 month [facility] SYSTEM OF THE [address] 5/31/22    In 1 month Que Fowler DO [address], [address], [facility] px (policy informed)    In 2 months Marcellus Bella MD [facility], [address], [place] MEDICARE  NON SX F/U               Passed - Last BP reading less than 140/90     BP Readings from Last 1 Encounters:  05/16/23 : 122/70              Passed - EGFRCR or GFRAA > 50     GFR Evaluation  GFRAA: 65 , resulted on 7/28/2022           Refused Prescriptions Disp Refills    amLODIPine 5 MG Oral Tab [Pharmacy Med Name: Amlodipine Besylate 5 Mg Tab Unic] 90 tablet 0     Sig: Take 1 tablet (5 mg total) by mouth daily. Hypertensive Medications Protocol Failed - 7/21/2023 11:41 AM        Failed - CMP or BMP in past 6 months     No results found for this or any previous visit (from the past 4392 hour(s)).             Failed - In person appointment or virtual visit in the past 6 months     Recent Outpatient Visits              1 week ago Primary osteoarthritis of right shoulder    [facility], [address], [place], [place], [place]    Office Visit    2 months ago Essential hypertension Sharon Colon MD    Office Visit    3 months ago Lumbar stenosis with neurogenic claudication    [facility], DO    Office Visit    3 months ago Osteoarthritis of left glenohumeral joint    [facility], [address], [facility], DO    Office Visit    6 months ago Essential hypertension    Sharon Colon MD    Office Visit          Future Appointments         Provider Department Appt Notes    In 1 month [facility] SYSTEM OF Counts include 234 beds at the [facility] [address] [place] [address] 5/31/22    In 1 month Jodi Dole, DO [address], [address], [place] px (policy informed)    In 2 months Jemma Nesbitt MD [address], [address], [place] MEDICARE  NON [address] [facility] F/U               Passed - In person appointment in the past 12 or next 3 months     Recent Outpatient Visits              1 week ago Primary osteoarthritis of right shoulder    [address] [facility], [facility] [place], [place]    Office Visit    2 months ago Essential hypertension    [address] [facility] Dylan Rodriguez [facility]    Office Visit    3 months ago Lumbar stenosis with neurogenic claudication    [facility], [facility]    Office Visit    3 months ago Osteoarthritis of left glenohumeral joint    [facility], [address], [facility], DO    Office Visit    6 months ago Essential hypertension    [address] [facility], Dylan Rodriguez MD    Office Visit          Future Appointments         Provider Department Appt Notes    In 1 month [facility] SYSTEM OF Counts include 234 beds at the [facility] [address] [place] [address] 5/31/22    In 1 month Jodi Dole, DO [address], [address], [place] px (policy informed)    In 2 months Jemma Nesbitt MD [facility], [address], [place] MEDICARE  NON SX F/U               Passed - Last BP reading less than 140/90     BP Readings from Last 1 Encounters:  05/16/23 : 122/70              Passed - EGFRCR or GFRAA > 50     GFR Evaluation  GFRAA: 65 , resulted on 7/28/2022

## 2023-08-18 NOTE — TELEPHONE ENCOUNTER
Please review; protocol failed. No active /future labs noted   Message sent for patient to make an appointment. Requested Prescriptions   Pending Prescriptions Disp Refills    HYDROCHLOROTHIAZIDE 12.5 MG Oral Tab [Pharmacy Med Name: Hydrochlorothiazide 12.5 Mg Tab Acta] 90 tablet 0     Sig: TAKE ONE TABLET BY MOUTH ONE TIME DAILY       Hypertensive Medications Protocol Failed - 8/18/2023  9:34 AM        Failed - CMP or BMP in past 6 months     No results found for this or any previous visit (from the past 4392 hour(s)).             Failed - In person appointment or virtual visit in the past 6 months     Recent Outpatient Visits              1 month ago Primary osteoarthritis of right shoulder    [address] [facility], Jerald Prado DO    Office Visit    3 months ago Essential hypertension    [address] [facility], Jessy Pierson MD    Office Visit    4 months ago Lumbar stenosis with neurogenic claudication    [facility] Lara Buckley,     Office Visit    4 months ago [facility]oarthritis [facility] left glenohumeral joint    [facility] [address], Jerald Prado DO    Office Visit    7 months ago Essential hypertension    [address] [facility], Jessy Pierson MD    Office Visit          Future Appointments         Provider Department Appt Notes    In 4 days [facility] SYSTEM OF THE [place] [id number] [place] [address] 5/31/22    In 2 weeks Gagan Clancy DO [address], [address], [place] px (policy informed)    In 2 months Jaci Alves MD [facility], [address], [place] MEDICARE  NON SX F/U               Failed - EGFRCR or GFRAA > 50     GFR Evaluation            Passed - In person appointment in the past 12 or next 3 months     Recent Outpatient Visits              1 month ago [facility] of right shoulder    [facility], [address] [facility] [address], [place] Jerald Villalpando [place]    Office Visit    3 months ago Essential hypertension    [address] [facility], Yuri Coreas MD    Office Visit    4 months ago Lumbar stenosis with neurogenic claudication    [facility] Regla Kam, DO    Office Visit    4 months ago Osteoarthritis of left glenohumeral joint    [facility], [address], [place] Jerald Villalpando, DO    Office Visit    7 months ago Essential hypertension    Megan Gil MD    Office Visit          Future Appointments         Provider Department Appt Notes    In 4 days [address] [place] [address] 5/31/22    In 2 weeks DO Nadege Pepe, [address], [address] px (policy informed)    In 2 months MD Nadege Garcia, [address] [facility] [address], [place] MEDICARE  NON SX F/U               Passed - Last BP reading less than 140/90     BP Readings from Last 1 Encounters:  05/16/23 : 122/70                 Recent Outpatient Visits              1 month ago Primary osteoarthritis of right shoulder    [address] [facility], Jerald Barajas, DO    Office Visit    3 months ago Essential hypertension    [address] [facility], Yuri Coreas MD    Office Visit    4 months ago Lumbar stenosis with neurogenic claudication    [facility] Regla Kam, DO    Office Visit    4 months ago Osteoarthritis of left glenohumeral joint    [facility], [address], [place] Regla Kam, DO    Office Visit    7 months ago Essential hypertension    [address] [facility], Yuri Coreas MD    Office Visit          Future Appointments         Provider Department Appt Notes    In 4 days [address] [place] [address] glad[place] 5/31/22    In 2 weeks Onita Asa,  [address] px (policy informed)    In 2 months Emily Blackwood MD [facility], [address] [place] [address],[address], [place] MEDICARE  NON SX F/U

## 2023-09-07 NOTE — PATIENT INSTRUCTIONS
All adult screening ordered and done appropriate for patient's age and gender and risk factors and complaints. Medication reviewed and renewed where needed and appropriate. Comply with medications. Monitor blood pressures and record at home. Limit salt intake. Encouraged safe physical fitness and daily physical activity daily. Neurosurgery appointment recommended. Lotrisone prescribed for posterior ear rash.

## 2023-09-07 NOTE — PROGRESS NOTES
Subjective:     Patient ID: Javier Cavazos is a 70year old female. 70year old male here for complete preventive care physical and for status update on any confirmed chronic medical illnesses and follow up on any previous labs or procedures that were suggestive or in need of further work up. Colonoscopy is current. Bowel and bladder functions are intact. Patient reports a continuation of left lower extremity leg cramps along with great toe numbness on the left and a tight band around her waist just prior to bedtime and after not having any pain medication in any form for some hours. Patient did get a corticosteroid injection earlier in this calendar year which lasted for some time, however symptoms have returned. I have spoken to the patient regarding gaining a neurosurgery perspective with the persistence of her symptoms. Patient has developed a pruritic rash in the region of the crease of the right ear posterior region. History/Other:   Review of Systems  Current Outpatient Medications   Medication Sig Dispense Refill    lansoprazole 30 MG Oral Capsule Delayed Release TAKE ONE CAPSULE BY MOUTH EVERY MORNING BEFORE BREAKFAST 90 capsule 3    losartan 50 MG Oral Tab Take 1 tablet (50 mg total) by mouth daily. 90 tablet 0    hydroCHLOROthiazide 12.5 MG Oral Tab Take 1 tablet (12.5 mg total) by mouth daily. 90 tablet 0    amLODIPine 5 MG Oral Tab Take 1 tablet (5 mg total) by mouth daily. 90 tablet 0    escitalopram 5 MG Oral Tab Take 1 tablet (5 mg total) by mouth daily. 90 tablet 1    topiramate 25 MG Oral Tab Take 1 tablet (25 mg total) by mouth daily. 90 tablet 1    Phentermine HCl 30 MG Oral Cap Take 1 capsule (30 mg total) by mouth every morning. 30 capsule 2    levocetirizine 5 MG Oral Tab TAKE ONE TABLET BY MOUTH EVERY DAY AT BEDTIME 90 tablet 3    gabapentin 300 MG Oral Cap Take 2 capsules  by mouth in the morning, at noon, and at bedtime.  360 capsule 2    Norethindrone (SHAROBEL) 0.35 MG Oral Tab Take 1 tablet (0.35 mg total) by mouth daily. 84 tablet 3    METHOCARBAMOL 750 MG Oral Tab Take 1 tablet  by mouth Daily as needed (muscle tightness/spasms). 90 tablet 0    NAPROXEN 500 MG Oral Tab TAKE ONE TABLET BY MOUTH TWICE DAILY AS NEEDED 60 tablet 0    fluticasone propionate 50 MCG/ACT Nasal Suspension SPRAY TWICE IN EACH NOSTRIL DAILY 48 g 1    clindamycin 1 % External Lotion Apply 1 Application. topically 2 (two) times daily. 60 mL 1    diazePAM 2 MG Oral Tab 1 tab PO 1 hour before MRI; 1 tab PO 30 minutes before MRI PRN claustrophobia. Must have . 2 tablet 0    diclofenac 1 % External Gel Apply 4 g topically 4 (four) times daily. 1 each 0    Acetaminophen  MG Oral Tab CR Take 1 tablet (650 mg total) by mouth every 8 (eight) hours as needed for Pain. acyclovir 5 % External Ointment Apply 1 g topically as needed. 30 g 0    ASPIRIN EC LOW DOSE 81 MG Oral Tab EC TAKE 1 TABLET BY MOUTH DAILY. 30 tablet 3    simvastatin 20 MG Oral Tab Take 1 tablet (20 mg total) by mouth nightly. hydrocortisone 1 % External Cream Apply 1 Application topically 2 (two) times daily. 30 g 0    Probiotic Product (ACIDOPHILUS PROBIOTIC BLEND OR) Take  by mouth 2 (two) times daily. Multiple Vitamins-Minerals (MULTI-VITAMIN/MINERALS) Oral Tab Take 1 tablet by mouth daily. Allergies:No Known Allergies    Past Medical History:   Diagnosis Date    Arthritis     left shoulder     Back problem     Cervical disc disease     Colon polyp     High blood pressure     High cholesterol     HTN (hypertension) 3/20/2015    Obesity (BMI 30-39. 9)     Osteoarthritis     Other and unspecified hyperlipidemia     Unspecified essential hypertension     Uterine fibroid       Past Surgical History:   Procedure Laterality Date          COLONOSCOPY  2015    COLONOSCOPY      HYSTERECTOMY      IR EPIDURAL STERIOD INJECTION      LUMBAR SPINE FUSION COMBINED      SPINE SURGERY PROCEDURE UNLISTED 5/21/2014    Lumbar microdiscectomy    TOTAL ABDOMINAL HYSTERECTOMY  1992    w/ BSO    TUBAL LIGATION        Family History   Problem Relation Age of Onset    Diabetes Father     Hypertension Mother     Lipids Mother     Heart Disorder Mother     Breast Cancer Sister         in her 29's. Cancer Other         Uncles with lung cancer      Social History:   Social History     Socioeconomic History    Marital status:     Number of children: 11   Occupational History    Occupation:      Comment: retired   Tobacco Use    Smoking status: Every Day     Packs/day: 0.00     Years: 15.00     Pack years: 0.00     Types: Cigarettes     Start date: 12/26/2017    Smokeless tobacco: Never    Tobacco comments:     currently every day smoker, 5-6 cigs/day   Vaping Use    Vaping Use: Never used   Substance and Sexual Activity    Alcohol use: Yes     Alcohol/week: 0.0 standard drinks of alcohol     Comment: occasionally    Drug use: No   Other Topics Concern    Caffeine Concern Yes     Comment: 2 cups tea/day    Exercise Yes     Comment: chair exercise class 1x/week, walks stairs    Reaction to local anesthetic No   Social History Narrative    The patient does not use an assistive device. .      The patient does live in a home with stairs. Rocío Dalton's SCREENING SCHEDULE   Tests on this list are recommended by your physician but may not be covered, or covered at this frequency, by your insurer. Please check with your insurance carrier before scheduling to verify coverage.    PREVENTATIVE SERVICES  INDICATIONS AND SCHEDULE Internal Lab or Procedure External Lab or Procedure   Diabetes Screening      HbgA1C   Annually HgbA1C (%)   Date Value   05/13/2019 5.6         No data to display                Fasting Blood Sugar (FSB)Annually Glucose (mg/dL)   Date Value   07/28/2022 93         No data to display               Cardiovascular Disease Screening     LDL Annually LDL Cholesterol (mg/dL)   Date Value 07/28/2022 114 (H)        EKG One Time      Colorectal Cancer Screening      Colonoscopy Screen every 10 years Colorectal Cancer Screening due on 11/18/2027 Update Health Maintenance if applicable    Flex Sigmoidoscopy Screen every 5 years No results found for this or any previous visit. No data to display                 Fecal Occult Blood Annually No results found for: FOB, OCCULTSTOOL      No data to display                Glaucoma Screening      Ophthalmology Visit Annually      Bone Density Screening      Dexascan Every two years Last Dexa Scan:    XR DEXA BONE DENSITOMETRY (CPT=77080) 09/26/2019        No data to display               Pap and Pelvic      Pap: Every 3 yrs age 21-65 or Pap+HPV every 5 yrs age 33-67, age 72 and older at high risk   No recommendations at this time Update Health Maintenance if applicable    Chlamydia  Annually if high risk No results found for: CHLAMYDIA      No data to display                Screening Mammogram      Mammogram  Annually Mammogram Discontinued Update Health Maintenance if applicable   Immunizations      Influenza No orders found for this or any previous visit. Update Immunization Activity if applicable    Pneumococcal Orders placed or performed in visit on 11/17/20    PNEUMOCOCCAL IMM (PNEUMOVAX)   Orders placed or performed in visit on 05/13/19    PNEUMOCOCCAL VACC, 13 MICKEY IM    Update Immunization Activity if applicable    Hepatitis B No orders found for this or any previous visit. Update Immunization Activity if applicable    Tetanus Orders placed or performed in visit on 07/26/21    TETANUS, DIPHTHERIA TOXOIDS AND ACELLULAR PERTUSIS VACCINE (TDAP), >7 YEARS, IM USE    Update Immunization Activity if applicable    Zoster (Not covered by Medicare Part B) No orders found for this or any previous visit.  Update Immunization Activity if applicable     SPECIFIC DISEASE MONITORING Internal Lab or Procedure External Lab or Procedure   Annual Monitoring of Persistent     Medications (ACE/ARB, digoxin, diuretics)    Potassium  Annually Potassium (mmol/L)   Date Value   07/28/2022 3.7     POTASSIUM (P) (mmol/L)   Date Value   09/29/2016 3.8         No data to display                Creatinine  Annually Creatinine (mg/dL)   Date Value   07/28/2022 0.98   12/26/2017 0.94         No data to display                Digoxin Serum Conc  Annually No results found for: DIGOXIN      No data to display                Diabetes      HgbA1C  Annually HgbA1C (%)   Date Value   05/13/2019 5.6         No data to display                Creat/alb ratio  Annually      LDL  Annually LDL Cholesterol (mg/dL)   Date Value   07/28/2022 114 (H)         No data to display                 Dilated Eye exam  Annually      No data to display                   No data to display                COPD      Spirometry Testing Annually No results found for this or any previous visit. No data to display                      General Health     In the past six months, have you lost more than 10 pounds without trying?: 2 - No    Has your appetite been poor?: No    Type of Diet: Balanced; Low Salt    How does the patient maintain a good energy level?: Stretching    How would you describe your daily physical activity?: Light    How would you describe your current health state?: Good    How do you maintain positive mental well-being?: Social Interaction; Visiting Friends; Visiting Family         Have you had any immunizations at another office such as Influenza, Hepatitis B, Tetanus, or Pneumococcal?: Yes     Functional Ability     Bathing or Showering: Able without help    Toileting: Able without help    Dressing: Able without help    Eating: Able without help    Driving: Able without help    Preparing your meals: Able without help    Managing money/bills: Able without help    Taking medications as prescribed: Able without help    Are you able to afford your medications?: Yes    Hearing Problems?: No Functional Status     Hearing Problems?: No    Vision Problems? : Yes    Difficulty walking?: Yes    Difficulty dressing or bathing?: No    Problems with daily activities? : No    Memory Problems?: Yes      Fall/Risk Assessment                                                              Depression Screening (PHQ-2/PHQ-9): Over the LAST 2 WEEKS                      Advance Directives     Do you have a healthcare power of ?: Yes    Do you have a living will?: No     Hearing Assessment (Required for AWV/SWV)      Hearing Screening    Time taken: 9/7/2023  2:00 PM  Entry User: Cande Morejon MA  Screening Method: Questionnaire  I have a problem hearing over the telephone: No I have trouble following the conversations when two or more people are talking at the same time: No   I have trouble understanding things on the TV: No I have to strain to understand conversations: No   I have to worry about missing the telephone ring or doorbell: No I have trouble hearing conversations in a noisy background such as a crowded room or restaurant: No   I get confused about where sounds come from: No I misunderstand some words in a sentence and need to ask people to repeat themselves: No   I especially have trouble understanding the speech of women and children: No I have trouble understanding the speaker in a large room such as at a meeting or place of [employer]: No   Many people I talk to seem to mumble (or don't speak clearly): No People get annoyed because I misunderstand what they say: No   I misunderstand what others are saying and make inappropriate responses: No I avoid social activities because I cannot hear well and fear I will reply improperly: No   Family members and friends have told me they think I may have hearing loss:  No             Visual Acuity     Right Eye Visual Acuity: Uncorrected Left Eye Visual Acuity: Uncorrected   Right Eye Chart Acuity: 20/40 Left Eye Chart Acuity: 20/40     Cognitive Assessment What day of the week is this?: Correct    What month is it?: Correct    What year is it?: Correct    Recall \"Ball\": Correct    Recall \"Flag\": Correct    Recall \"Tree\": Correct          Objective:    09/07/23  1405   BP: 122/71   Pulse: 85   Temp:        Physical Exam  Constitutional:       Appearance: Normal appearance. HENT:      Head: Normocephalic and atraumatic. Right Ear: Tympanic membrane normal.      Left Ear: Tympanic membrane normal.      Nose: Nose normal.      Mouth/Throat:      Mouth: Mucous membranes are moist.   Cardiovascular:      Rate and Rhythm: Regular rhythm. Heart sounds: Murmur heard. Pulmonary:      Effort: Pulmonary effort is normal.      Breath sounds: Normal breath sounds. Musculoskeletal:      Lumbar back: Spasms and tenderness present. Decreased range of motion. Back:       Comments: Region of discomfort as depicted. Neurological:      Mental Status: She is alert and oriented to person, place, and time. Psychiatric:         Mood and Affect: Mood normal.         Assessment & Plan:   1. Medicare annual wellness visit, initial    - CBC WITH DIFFERENTIAL WITH PLATELET; Future  - COMP METABOLIC PANEL (14); Future  - LIPID PANEL; Future  - TSH W REFLEX TO FREE T4; Future  - URINALYSIS, ROUTINE; Future    2. Gastroesophageal reflux disease  Prescribed. - lansoprazole 30 MG Oral Capsule Delayed Release; TAKE ONE CAPSULE BY MOUTH EVERY MORNING BEFORE BREAKFAST  Dispense: 90 capsule; Refill: 3    3. Essential hypertension  To goal.    4. Hyperlipidemia, unspecified hyperlipidemia type  Status to be updated. 5. Renal insufficiency  Status to be updated. 6. Spinal stenosis of lumbar region with neurogenic claudication  Referred. - NEUROSURGERY - INTERNAL    7. Rash and nonspecific skin eruption  Prescribed. - clotrimazole-betamethasone 1-0.05 % External Cream; Apply 1 Application topically 2 (two) times daily as needed.   Dispense: 45 g; Refill: 3      Orders Placed This Encounter      CBC With Diff      CMP      Lipid Panel      TSH,  Reflex Free T4      Urinalysis, Routine [E]      Meds This Visit:  Requested Prescriptions     Signed Prescriptions Disp Refills    lansoprazole 30 MG Oral Capsule Delayed Release 90 capsule 3     Sig: TAKE ONE CAPSULE BY MOUTH EVERY MORNING BEFORE BREAKFAST       Imaging & Referrals:  None     Patient Instructions   All adult screening ordered and done appropriate for patient's age and gender and risk factors and complaints. Medication reviewed and renewed where needed and appropriate. Comply with medications. Monitor blood pressures and record at home. Limit salt intake. Encouraged safe physical fitness and daily physical activity daily. Neurosurgery appointment recommended. Lotrisone prescribed for posterior ear rash. Return in about 1 year (around 9/7/2024), or if symptoms worsen or fail to improve.

## 2023-09-23 NOTE — TELEPHONE ENCOUNTER
Spoke to patient , states she is still sleeping with the wrist brace. The numbness and tingling is almost all gone. Per patient state 70 %  better after injection on 7/11/2023. Still taking gabapentin 2 caps morning, noon and Evening. Medication request: Gabapentin 300 mg oral cap. Take 2 capsules by mouth in the morning, at noon and at bedtime. #360. 2 refills. LOV:7/11/2023  Due back to clinic per last office note: If the numbness and tingling persist consider EMG of the bilateral upper extremities to evaluate for bilateral carpal tunnel syndrome, rule out cervical radiculopathy. NOV: 10/12/2023    [place]    Urine drug screen (if applicable):n/a  Pain contract: n/a    LOV plan:   Please let me know how your right shoulder and the numbness and tingling are doing in 1-2 months - either [employer] Direct[employer] message or telephone call. I also recommend nighttime wrist bracing for the next 1 to 2 months and see if the numbness and tingling resolve.

## 2023-09-26 ENCOUNTER — HOSPITAL ENCOUNTER (OUTPATIENT)
Dept: CT IMAGING | Facility: HOSPITAL | Age: 78
Discharge: HOME | End: 2023-09-26
Attending: STUDENT IN AN ORGANIZED HEALTH CARE EDUCATION/TRAINING PROGRAM
Payer: MEDICARE

## 2023-09-26 ENCOUNTER — HOSPITAL ENCOUNTER (OUTPATIENT)
Dept: GENERAL RADIOLOGY | Facility: HOSPITAL | Age: 78
Discharge: HOME | End: 2023-09-26
Attending: STUDENT IN AN ORGANIZED HEALTH CARE EDUCATION/TRAINING PROGRAM
Payer: MEDICARE

## 2023-09-26 DIAGNOSIS — R20.2 NUMBNESS AND TINGLING OF FOOT: ICD-10-CM

## 2023-09-26 DIAGNOSIS — Z98.1 HISTORY OF LUMBAR FUSION: ICD-10-CM

## 2023-09-26 DIAGNOSIS — M48.062 NEUROGENIC CLAUDICATION DUE TO LUMBAR SPINAL STENOSIS: ICD-10-CM

## 2023-09-26 DIAGNOSIS — R29.898 WEAKNESS OF BOTH LOWER EXTREMITIES: ICD-10-CM

## 2023-09-26 DIAGNOSIS — R20.0 NUMBNESS AND TINGLING OF FOOT: ICD-10-CM

## 2023-09-26 PROCEDURE — 72131 CT LUMBAR SPINE W/O DYE: CPT | Performed by: STUDENT IN AN ORGANIZED HEALTH CARE EDUCATION/TRAINING PROGRAM

## 2023-09-26 PROCEDURE — 72082 X-RAY EXAM ENTIRE SPI 2/3 VW: CPT | Performed by: STUDENT IN AN ORGANIZED HEALTH CARE EDUCATION/TRAINING PROGRAM

## 2023-10-12 RX ORDER — PHENTERMINE HYDROCHLORIDE 30 MG/1
30 CAPSULE ORAL EVERY MORNING
Qty: 30 CAPSULE | Refills: 0 | Status: SHIPPED | OUTPATIENT
Start: 2023-10-12

## 2023-10-17 ENCOUNTER — OFFICE VISIT (OUTPATIENT)
Dept: SURGERY | Facility: CLINIC | Age: 78
End: 2023-10-17
Payer: MEDICARE

## 2023-10-17 ENCOUNTER — TELEPHONE (OUTPATIENT)
Dept: PHYSICAL MEDICINE AND REHAB | Facility: CLINIC | Age: 78
End: 2023-10-17

## 2023-10-17 ENCOUNTER — OFFICE VISIT (OUTPATIENT)
Dept: PHYSICAL MEDICINE AND REHAB | Facility: CLINIC | Age: 78
End: 2023-10-17
Payer: MEDICARE

## 2023-10-17 VITALS — WEIGHT: 190 LBS | HEART RATE: 85 BPM | BODY MASS INDEX: 32 KG/M2 | OXYGEN SATURATION: 96 %

## 2023-10-17 VITALS
BODY MASS INDEX: 31.34 KG/M2 | HEART RATE: 82 BPM | OXYGEN SATURATION: 95 % | HEIGHT: 66 IN | DIASTOLIC BLOOD PRESSURE: 72 MMHG | WEIGHT: 195 LBS | SYSTOLIC BLOOD PRESSURE: 120 MMHG

## 2023-10-17 DIAGNOSIS — I10 ESSENTIAL HYPERTENSION: Primary | ICD-10-CM

## 2023-10-17 DIAGNOSIS — E66.9 OBESITY (BMI 30-39.9): ICD-10-CM

## 2023-10-17 DIAGNOSIS — M48.062 SPINAL STENOSIS OF LUMBAR REGION WITH NEUROGENIC CLAUDICATION: Primary | ICD-10-CM

## 2023-10-17 DIAGNOSIS — I10 ESSENTIAL HYPERTENSION: ICD-10-CM

## 2023-10-17 DIAGNOSIS — F43.9 STRESS: ICD-10-CM

## 2023-10-17 DIAGNOSIS — Z51.81 ENCOUNTER FOR THERAPEUTIC DRUG MONITORING: ICD-10-CM

## 2023-10-17 PROCEDURE — 99214 OFFICE O/P EST MOD 30 MIN: CPT | Performed by: PHYSICAL MEDICINE & REHABILITATION

## 2023-10-17 PROCEDURE — 99214 OFFICE O/P EST MOD 30 MIN: CPT | Performed by: INTERNAL MEDICINE

## 2023-10-17 PROCEDURE — 1125F AMNT PAIN NOTED PAIN PRSNT: CPT | Performed by: PHYSICAL MEDICINE & REHABILITATION

## 2023-10-17 RX ORDER — ESCITALOPRAM OXALATE 10 MG/1
10 TABLET ORAL DAILY
Qty: 90 TABLET | Refills: 1 | Status: SHIPPED | OUTPATIENT
Start: 2023-10-17 | End: 2024-01-15

## 2023-10-17 RX ORDER — PHENTERMINE HYDROCHLORIDE 30 MG/1
30 CAPSULE ORAL EVERY MORNING
Qty: 30 CAPSULE | Refills: 3 | Status: SHIPPED | OUTPATIENT
Start: 2023-11-15

## 2023-10-17 NOTE — TELEPHONE ENCOUNTER
Per CMS Guidelines -no authorization is required for Caudal epidural steroid injection under fluoroscopy CPT 58417    Status: Authorization is not required based on medical necessity however may be subject to review once claim is submitted-Covered Benefit    Fyi, per CMS LCD limitations-Use of Moderate or Deep Sedation, General Anesthesia, or Monitored Anesthesia Care (MAC) is usually unnecessary or rarely indicated for these procedures and therefore, is not considered medically reasonable and necessary. Even in patients with a needle phobia and anxiety, typically oral anxiolytics suffice. In exceptional and unique cases, documentation must clearly establish the need for such sedation in the specific patient.     Per Dr. Marko Solis w/ IVCS

## 2023-10-17 NOTE — PATIENT INSTRUCTIONS
You may have breakfast more than 6 hours before the scheduled procedure. Make sure you hold the phentermine 7 days prior to procedure.

## 2023-10-18 RX ORDER — AMLODIPINE BESYLATE 5 MG/1
5 TABLET ORAL DAILY
Qty: 90 TABLET | Refills: 3 | Status: SHIPPED | OUTPATIENT
Start: 2023-10-18

## 2023-10-18 NOTE — TELEPHONE ENCOUNTER
Per Dr. Russell Azar, [facility] schedule patient after 2pm as she has an MRI scheduled for 12:45pm.\"  Patient has been scheduled for Caudal epidural steroid injection under fluoroscopy  on 11/06/23 at the [facility] with . -Anesthesia type: IVCS. -If scheduling [facility] covid testing required for all procedures whether patient is vaccinated or not. -Patient informed not to eat or drink anything after midnight the night prior to the procedure, if being sedated. (For afternoon injections: Patient to fast 6-8 hours prior to procedure with IVCS/MAC. )  -Patient was advised that if he/she does receive the covid vaccine it needs to be at least 2 weeks before or after the injection. -Medications and allergies reviewed. -Patient reminded to hold NSAIDs (Ibuprofen, ASA 81, Aleve, Naproxen, Mobic, Diclofenac, Etodolac, Celebrex etc.) for 3 days prior to [facility]  if BMI is greater than 35. For Cervical injections only hold multivitamins, Vitamin E, Fish Oil, Phentermine (Lomaira) for 7 days prior to injection and NSAIDS.  mg to be held for 7 days prior to injections.  -If patient is receiving MAC/IVCS Phentermine Ruth Romberg), Adlyxin (Lixisenatide), Bydureon BCise (Exenatide-release), Byetta (Exenatide), Mounjaro (Tirezepatide), Ozempic (Semaglutide), Rybelsus (oral demaglutide), Saxenda (Liraglutide), Trulicity (Dulaglutide), Victoriza (Liraglutide), Wegovy (Semaglutide), Berberine (oral natures ozempic) will need to be held for 7 days prior to injection.  -If patient's BMI is greater than 50. Patient is unable to get IVCS/MAC at [facility]. (Options: Patient can go to [facility] under MAC or ENDO under IVCS-reminder physician's slots at ENDO are limited. OR Patient can have the procedure done under local anesthesia at [facility] with Valium ( per physician's preference.)    -If on blood thinner clearance has been received to hold this medication by provider.  Patient does not need to hold her aspirin.  -Patient informed he/she will need a  to and from procedure. -[facility] is located in the [facility] 1st floor. Patient may park in the yellow or purple parking.     Follow up appointment has been scheduled for patient on: n/a      Patient verbalized understanding and agrees with plan.  -----> Scheduled in Epic: Yes  -----> Scheduled in [employer]/Surgical Case Request: Yes

## 2023-10-18 NOTE — TELEPHONE ENCOUNTER
Refill passed per [facility] protocol.    Requested Prescriptions   Pending Prescriptions Disp Refills    AMLODIPINE 5 MG Oral Tab [Pharmacy Med Name: Amlodipine Besylate 5 Mg Tab Unic] 90 tablet 0     Sig: TAKE ONE TABLET BY MOUTH ONE TIME DAILY       Hypertensive Medications Protocol Passed - 10/17/2023 11:28 AM        Passed - In person appointment in the past 12 or next 3 months     Recent Outpatient Visits              Yesterday Spinal stenosis of lumbar region with neurogenic claudication    [facility] [facility]    [facility]    Yesterday Essential hypertension    [facility] Jose Elias Templeton MD    Office Visit    1 month ago Neurogenic claudication due to lumbar spinal stenosis    [facility] [place] Giovanny Noguera PA-C    Office Visit    1 month ago Medicare annual wellness visit, initial    [facility] Estuardo Lopes DO    Office Visit    3 months ago Primary osteoarthritis of right shoulder    [facility] [place] [facility]    [facility] Visit          Future Appointments         Provider Department Appt Notes    Tomorrow Marce Chacko, PT [facility] Medicare/BCBS Suppl    In 1 week Marce Chacko, SKYE [facility] Medicare/BCBS Suppl    In 2 weeks Marce Chacko, SKYE [facility] Medicare/BCBS Suppl    In 2 weeks [facility] MRI RM1 (1.5T) [facility]     In 2 weeks Sher Loo DO [facility] Caudal epidural steroid injection under fluoroscopy, IVCS    In 3 weeks Holland Castano, PT [facility] Medicare/BCBS Suppl    In 4 weeks Holland Castano, PT [facility]  Rehab Services [facility] Medicare/BCBS Suppl    In 1 month Holland Castano, PT [facility] Medicare/BCBS Suppl    In 1 month Holland Castano, PT [facility] [address] Medicare/BCBS Suppl    In 4 months Jose Elias Templeton MD [facility], [address], [place] MEDICARE  NON SX F/U                      Passed - Last BP reading less than 140/90     BP Readings from Last 1 Encounters:  10/17/23 : 120/72              Passed - CMP or BMP in past 6 months     Recent Results (from the past 4392 hour(s))   CMP    Collection Time: 09/07/23  3:21 PM   Result Value Ref Range    Glucose 83 70 - 99 mg/dL    Sodium 144 136 - 145 mmol/L    Potassium 3.6 3.5 - 5.1 mmol/L    Chloride 112 98 - 112 mmol/L    CO2 28.0 21.0 - 32.0 mmol/L    Anion Gap 4 0 - 18 mmol/L    BUN 13 7 - 18 mg/dL    Creatinine 1.12 (H) 0.55 - 1.02 mg/dL    BUN/CREA Ratio 11.6 10.0 - 20.0    Calcium, Total 9.5 8.5 - 10.1 mg/dL    Calculated Osmolality 297 (H) 275 - 295 mOsm/kg    eGFR-Cr 51 (L) >=60 mL/min/1.73m2    ALT 22 13 - 56 U/L    AST 22 15 - 37 U/L    Alkaline Phosphatase 90 55 - 142 U/L    Bilirubin, Total 0.4 0.1 - 2.0 mg/dL    Total Protein 7.3 6.4 - 8.2 g/dL    Albumin 4.2 3.4 - 5.0 g/dL    Globulin  3.1 2.8 - 4.4 g/dL    A/G Ratio 1.4 1.0 - 2.0    Patient Fasting for CMP? No      *Note: Due to a large number of results and/or encounters for the requested time period, some results have not been displayed. A complete set of results can be found in Results Review.               Passed - In person appointment or virtual visit in the past 6 months     Recent Outpatient Visits              Yesterday Spinal stenosis of lumbar region with neurogenic claudication    [facility]    Office Visit    Yesterday Essential hypertension    [facility] [place] [place] [facility]    Office Visit    1  month ago Neurogenic claudication due to lumbar spinal stenosis    [facility] Giovanny Noguera PA-C    Office Visit    1 month ago Medicare annual wellness visit, initial    [facility] Estuardo Lopes DO    Office Visit    3 months ago Primary osteoarthritis of right shoulder    [facility]    [facility] Visit          Future Appointments         Provider Department Appt Notes    Tomorrow Marce Chacko, PT [facility] Medicare/BCBS Suppl    In 1 week Marce Chacko, PT [facility] Medicare/BCBS Suppl    In 2 weeks Marce Chacko, PT [facility] Medicare/BCBS Suppl    In 2 weeks [facility] MRI [facility] (1.5T) [facility]     In 2 weeks Sher Loo DO [facility] Caudal epidural steroid injection under fluoroscopy, IVCS    In 3 weeks Holland Castano, PT [facility] Medicare/BCBS Suppl    In 4 weeks Holland Castano, PT [facility] Medicare/BCBS Suppl    In 1 month Holland [facility]ano, [facility] Medicare/BCBS Suppl    In 1 month Holland Castano, PT [facility] Medicare/BCBS Suppl    In 4 months Jose Elias Templeton MD [facility], [address], [place] MEDICARE  NON SX F/U                      Passed - EGFRCR or GFRAA > 50     GFR Evaluation  EGFRCR: 51 , resulted on 9/7/2023               Recent Outpatient Visits              Yesterday Spinal stenosis of lumbar region with neurogenic claudication    [facility]    Office Visit    Yesterday Essential hypertension     [facility] Jose Elias Templeton MD    Office Visit    1 month ago Neurogenic claudication due to lumbar spinal stenosis    [facility] Giovanny Noguera PA-C    Office Visit    1 month ago Medicare annual wellness visit, initial    [facility] Estuardo Lopes, DO    Office Visit    3 months ago Primary osteoarthritis of right shoulder    [facility] Sher Loo DO    Office Visit            Future Appointments         Provider Department Appt Notes    Tomorrow Marce Chacko, PT [facility] Medicare/BCBS Suppl    In 1 week Macre Chacko, PT [facility] Medicare/BCBS Suppl    In 2 weeks Marce Chacko, SKYE [facility] Medicare/BCBS Suppl    In 2 weeks [facility] MRI RM (1.5T) [facility]     In 2 weeks Sher Loo DO [facility] Caudal epidural steroid injection under fluoroscopy, IVCS    In 3 weeks Holland Castano, PT [facility] Medicare/BCBS Suppl    In 4 weeks Holland Castano, PT [facility] Medicare/BCBS Suppl    In 1 month Holland Castano, PT [facility] Medicare/BCBS Suppl    In 1 month Holland Castano, PT [facility] Medicare/BCBS Suppl    In 4 months Jose Elias Templeton MD [facility], [address], [place] MEDICARE  NON SX F/U

## 2023-10-19 ENCOUNTER — OFFICE VISIT (OUTPATIENT)
Dept: PHYSICAL THERAPY | Age: 78
End: 2023-10-19
Attending: STUDENT IN AN ORGANIZED HEALTH CARE EDUCATION/TRAINING PROGRAM
Payer: MEDICARE

## 2023-10-19 DIAGNOSIS — Z98.1 HISTORY OF LUMBAR FUSION: ICD-10-CM

## 2023-10-19 DIAGNOSIS — R29.898 WEAKNESS OF BOTH LOWER EXTREMITIES: ICD-10-CM

## 2023-10-19 DIAGNOSIS — R20.0 NUMBNESS AND TINGLING OF FOOT: ICD-10-CM

## 2023-10-19 DIAGNOSIS — R20.2 NUMBNESS AND TINGLING OF FOOT: ICD-10-CM

## 2023-10-19 DIAGNOSIS — M48.062 NEUROGENIC CLAUDICATION DUE TO LUMBAR SPINAL STENOSIS: Primary | ICD-10-CM

## 2023-10-19 PROCEDURE — 97162 PT EVAL MOD COMPLEX 30 MIN: CPT

## 2023-10-19 PROCEDURE — 97110 THERAPEUTIC EXERCISES: CPT

## 2023-10-19 NOTE — PATIENT INSTRUCTIONS
10 times, 3 times a day         Standing at wall, squeeze shoulder blades back, and perform pelvic tilt at wall    10 times, 3 times a day

## 2023-10-24 ENCOUNTER — TELEPHONE (OUTPATIENT)
Dept: FAMILY MEDICINE CLINIC | Facility: CLINIC | Age: 78
End: 2023-10-24

## 2023-10-24 NOTE — TELEPHONE ENCOUNTER
Patient is calling and would like to know If she is due for the covid booster and if she needs to get the RSV vaccine.

## 2023-10-24 NOTE — TELEPHONE ENCOUNTER
Do we administer the RSV vaccine from the [facility] clinic? Regarding the patient's inquiry about the COVID vaccine, please let her know that there are designated sites in the [facility] system to give this vaccine.

## 2023-10-24 NOTE — TELEPHONE ENCOUNTER
Patient asking if it is recommended she receive RSV and covid booster vaccines? Last covid vaccine 11/2/2022. Please advise. Order flags this message    Medicare does not cover Arexvy vaccines administered in office.  Switch order to prescription and recommend that the patient receives vaccine at a pharmacy

## 2023-10-25 ENCOUNTER — OFFICE VISIT (OUTPATIENT)
Dept: PHYSICAL THERAPY | Age: 78
End: 2023-10-25
Attending: STUDENT IN AN ORGANIZED HEALTH CARE EDUCATION/TRAINING PROGRAM

## 2023-10-25 PROCEDURE — 97110 THERAPEUTIC EXERCISES: CPT

## 2023-10-25 NOTE — PROGRESS NOTES
Dx: Neurogenic claudication due to lumbar spinal stenosis (L17.183)  History of lumbar fusion (Z98.1); Weakness of both lower extremities (R29.898); Numbness and tingling of foot (R20.0,R20.2)             Insurance (Authorized # of Visits): Medicare A&B (10 POC)           Authorizing Physician: Dr. Kathy Gutierrez / Demetrius Elizondo PAKeniaC Next MD visit: post therapy   Fall Risk: standard         Precautions: n/a             Subjective: Pt notes starting her exercises, no issues. Notes full body aching with change in weather   PAS 0-1/10  Objective:   (I, NW) postural adjustments, including upright trunk      Assessment:  Progression of both flexibility as well as core strength/stability. Reviewed HEP and given handout. Patient required intermittent cueing for proper posture and body mechanics.  Pt denies pain post session        Goals:     Pt will improve transversus abdominis recruitment to perform proper isometric contraction without requiring verbal or tactile cuing to promote advancement of therex   Pt will demonstrate good understanding of proper posture and body mechanics to decrease pain and improve spinal safety   Pt will improve lumbar spine AROM flexion (finger tips to toes) to allow increase ease with bending forward to don shoes   Pt will demonstrate l/s extension past neutral necessary for upright trunk during gait as well as reaching overhead into cabinet   Pt will have decreased paraspinal mm tension to tolerate standing >30 minutes for work and home activities   Pt will demonstrate improved core strength to be able to bend and lift groceries from the floor without limitation    Pt will be independent and compliant with comprehensive HEP to maintain progress achieved in PT     Plan: alleviate pain; improve lumbosacral rhythm, loss lower lumbar ROM/mobility, flexibility, core/proximal hip strength, neural tension, posture, balance, and body mechanics   Date: 10/25/2023  TX#: 2/10 Date:                 TX#: 3/ Date: TX#: 4/ Date:                 TX#: 5/ Date:    Tx#: 6/   Ther ex:  LTR 10x  Supine SB iso with pelvic tilt 20x  Hamstring stretch 6 x 10 cts  Hooklying hip abd ER ([place]) 20x  R/L SLR 10x  Seated LAQ with SB R/L 10x5 cts  Narrow rows YTB 15x  Roll ball up wall 5x              Manual:  NA                     HEP: seated and wall pelvic tilts; SLR; hamstring stretch; bridge     Charges: 3TE       Total Timed Treatment: 40 min  Total Treatment Time: 42 min

## 2023-11-01 ENCOUNTER — APPOINTMENT (OUTPATIENT)
Dept: PHYSICAL THERAPY | Age: 78
End: 2023-11-01
Attending: STUDENT IN AN ORGANIZED HEALTH CARE EDUCATION/TRAINING PROGRAM
Payer: MEDICARE

## 2023-11-06 ENCOUNTER — HOSPITAL ENCOUNTER (OUTPATIENT)
Dept: MRI IMAGING | Facility: HOSPITAL | Age: 78
Discharge: HOME | End: 2023-11-06
Attending: STUDENT IN AN ORGANIZED HEALTH CARE EDUCATION/TRAINING PROGRAM
Payer: MEDICARE

## 2023-11-06 DIAGNOSIS — R20.0 NUMBNESS AND TINGLING OF FOOT: ICD-10-CM

## 2023-11-06 DIAGNOSIS — R20.2 NUMBNESS AND TINGLING OF FOOT: ICD-10-CM

## 2023-11-06 DIAGNOSIS — F43.9 STRESS: ICD-10-CM

## 2023-11-06 DIAGNOSIS — M48.062 NEUROGENIC CLAUDICATION DUE TO LUMBAR SPINAL STENOSIS: ICD-10-CM

## 2023-11-06 DIAGNOSIS — R29.898 WEAKNESS OF BOTH LOWER EXTREMITIES: ICD-10-CM

## 2023-11-06 DIAGNOSIS — Z98.1 HISTORY OF LUMBAR FUSION: ICD-10-CM

## 2023-11-06 PROCEDURE — 72148 MRI LUMBAR SPINE W/O DYE: CPT | Performed by: STUDENT IN AN ORGANIZED HEALTH CARE EDUCATION/TRAINING PROGRAM

## 2023-11-06 RX ORDER — ESCITALOPRAM OXALATE 5 MG/1
5 TABLET ORAL DAILY
Qty: 90 TABLET | Refills: 0 | OUTPATIENT
Start: 2023-11-06

## 2023-11-07 ENCOUNTER — MED REC SCAN ONLY (OUTPATIENT)
Dept: PHYSICAL MEDICINE AND REHAB | Facility: CLINIC | Age: 78
End: 2023-11-07

## 2023-11-10 RX ORDER — HYDROCHLOROTHIAZIDE 12.5 MG/1
12.5 TABLET ORAL DAILY
Qty: 90 TABLET | Refills: 1 | Status: SHIPPED | OUTPATIENT
Start: 2023-11-10

## 2023-11-10 NOTE — TELEPHONE ENCOUNTER
Refill passed per [facility] protocol.     Requested Prescriptions   Pending Prescriptions Disp Refills    HYDROCHLOROTHIAZIDE 12.5 MG Oral Tab [Pharmacy Med Name: Hydrochlorothiazide 12.5 Mg Tab Acta] 90 tablet 0     Sig: TAKE ONE TABLET BY MOUTH ONE TIME DAILY       Hypertensive Medications Protocol Passed - 11/10/2023 10:00 AM        Passed - In person appointment in the past 12 or next 3 months     Recent Outpatient Visits              2 weeks ago     [facility] Marce Chacko, PT    Office Visit    3 weeks ago Neurogenic claudication due to lumbar spinal stenosis    [facility]ce Chacko, [facility]    Office Visit    3 weeks ago Spinal stenosis of lumbar region with neurogenic claudication    [facility] Sher Loo DO    Office Visit    3 weeks ago Essential hypertension    [facility] Jose Elias Templeton MD    Office Visit    1 month ago Neurogenic claudication due to lumbar spinal stenosis    [facility] Giovanny Noguera PA-C    Office Visit          Future Appointments         Provider Department Appt Notes    In 4 days [facility] Medicare/BCBS Suppl    In 6 days [facility] Medicare/BCBS Suppl    In 1 week Sher Loo DO [facility] Caudal epidural steroid injection under fluoroscopy, IVCS    In 1 week Holland Castano PT [facility] Medicare/BCBS Suppl    In 2 weeks Holland Castano PT [facility] Medicare/BCBS Suppl    In 2 weeks [facility] Fine, [facility] [place] Medicare/BCBS Suppl    In 3 months Jose Elias Templeton MD [facility]  [address], [place] MEDICARE  NON SX F/U               Passed - Last BP reading less than 140/90     BP Readings from Last 1 Encounters:   10/17/23 120/72               Passed - CMP or BMP in past 6 months     Recent Results (from the past 4392 hour(s))   CMP    Collection Time: 09/07/23  3:21 PM   Result Value Ref Range    Glucose 83 70 - 99 mg/dL    Sodium 144 136 - 145 mmol/L    Potassium 3.6 3.5 - 5.1 mmol/L    Chloride 112 98 - 112 mmol/L    CO2 28.0 21.0 - 32.0 mmol/L    Anion Gap 4 0 - 18 mmol/L    BUN 13 7 - 18 mg/dL    Creatinine 1.12 (H) 0.55 - 1.02 mg/dL    BUN/CREA Ratio 11.6 10.0 - 20.0    Calcium, Total 9.5 8.5 - 10.1 mg/dL    Calculated Osmolality 297 (H) 275 - 295 mOsm/kg    eGFR-Cr 51 (L) >=60 mL/min/1.73m2    ALT 22 13 - 56 U/L    AST 22 15 - 37 U/L    Alkaline Phosphatase 90 55 - 142 U/L    Bilirubin, Total 0.4 0.1 - 2.0 mg/dL    Total Protein 7.3 6.4 - 8.2 g/dL    Albumin 4.2 3.4 - 5.0 g/dL    Globulin  3.1 2.8 - 4.4 g/dL    A/G Ratio 1.4 1.0 - 2.0    Patient Fasting for CMP? No      *Note: Due to a large number of results and/or encounters for the requested time period, some results have not been displayed. A complete set of results can be found in Results Review.               Passed - In person appointment or virtual visit in the past 6 months     Recent Outpatient Visits              2 weeks ago     [facility] Marce Chacko, PT    Office Visit    3 weeks ago Neurogenic claudication due to lumbar spinal stenosis    [facility] Marce Chacko, PT    Office Visit    3 weeks ago Spinal stenosis of lumbar region with neurogenic claudication    [facility]    [facility]    3 weeks ago Essential hypertension    [facility]    Office Visit    1 month ago Neurogenic claudication due to lumbar  spinal stenosis    [facility] Giovanny Noguera PA-C    Office Visit          Future Appointments         Provider Department Appt Notes    In 4 days [facility], [facility] Medicare/BCBS Suppl    In 6 days Holland [facility] [address] Medicare/BCBS Suppl    In 1 week Sher Loo DO [facility] Caudal epidural steroid injection under fluoroscopy, IVCS    In 1 week Holland Castano, PT [facility] Medicare/BCBS Suppl    In 2 weeks Holland Castano, PT [facility] Medicare/BCBS Suppl    In 2 weeks [facility] Fine, [facility] [place] Medicare/BCBS Suppl    In 3 months Jose Elias Templeton MD [facility], [address], [place] MEDICARE  NON SX F/U               Passed - EGFRCR or GFRAA > 50     GFR Evaluation  EGFRCR: 51 , resulted on 9/7/2023                Recent Outpatient Visits              2 weeks ago     [facility] [place] [facility] Marce Chacko, PT    Office Visit    3 weeks ago Neurogenic claudication due to lumbar spinal stenosis    [facility] Marce Chacko, PT    Office Visit    3 weeks ago Spinal stenosis of lumbar region with neurogenic claudication    [facility]    Office Visit    3 weeks ago Essential hypertension    [facility] Jose Elias Templeton MD    Office Visit    1 month ago Neurogenic claudication due to lumbar spinal stenosis    [facility]    Office Visit             Future Appointments         Provider Department Appt Notes    In 4 days [facility]  [address] Medicare/BCBS Suppl    In 6 days Holland Castano, PT [facility] Medicare/BCBS Suppl    In 1 week Sher Loo DO [facility] Caudal epidural steroid injection under fluoroscopy, IVCS    In 1 week Holland Castano, PT [facility] Medicare/BCBS Suppl    In 2 weeks Holland Castano, PT [facility] Medicare/BCBS Suppl    In 2 weeks Marce Fine, PT [place] [facility] [place] Medicare/BCBS Suppl    In 3 months Jose Elias [facility] MD [facility] [address], [place] MEDICARE  NON SX F/U

## 2023-11-14 ENCOUNTER — APPOINTMENT (OUTPATIENT)
Dept: PHYSICAL THERAPY | Age: 78
End: 2023-11-14
Payer: MEDICARE

## 2023-11-14 ENCOUNTER — OFFICE VISIT (OUTPATIENT)
Dept: PHYSICAL THERAPY | Age: 78
End: 2023-11-14
Attending: STUDENT IN AN ORGANIZED HEALTH CARE EDUCATION/TRAINING PROGRAM
Payer: MEDICARE

## 2023-11-14 PROCEDURE — 97110 THERAPEUTIC EXERCISES: CPT

## 2023-11-14 PROCEDURE — 97140 MANUAL THERAPY 1/> REGIONS: CPT

## 2023-11-14 NOTE — PROGRESS NOTES
Dx: Neurogenic claudication due to lumbar spinal stenosis (O82.450)  History of lumbar fusion (Z98.1); Weakness of both lower extremities (R29.898); Numbness and tingling of foot (R20.0,R20.2)             Insurance (Authorized # of Visits): Medicare A&B (10 POC)           Authorizing Physician: Dr. Vasyl Jaimes / Leighann FLORIAN-C Next MD visit: post therapy   Fall Risk: standard         Precautions: n/a             Subjective: Pt states her pain comes and goes and is aggravated with prolonged standing. PAS 0-1/10  Objective:   - muscle tightness noted at lumbar paraspinal muscles  - hypomobility with symptoms reproduction at L5-S1 on right  - Anterior pelvic tilt  - hip flexor tightness elsy      Assessment:  Discussed motor control and improving core and glute strength while increasing hip flexor flexibility to decrease stress in L5-S1 facet.   Pt reported less pain post tx      Goals:     Pt will improve transversus abdominis recruitment to perform proper isometric contraction without requiring verbal or tactile cuing to promote advancement of therex   Pt will demonstrate good understanding of proper posture and body mechanics to decrease pain and improve spinal safety   Pt will improve lumbar spine AROM flexion (finger tips to toes) to allow increase ease with bending forward to don shoes   Pt will demonstrate l/s extension past neutral necessary for upright trunk during gait as well as reaching overhead into cabinet   Pt will have decreased paraspinal mm tension to tolerate standing >30 minutes for work and home activities   Pt will demonstrate improved core strength to be able to bend and lift groceries from the floor without limitation    Pt will be independent and compliant with comprehensive HEP to maintain progress achieved in PT     Plan: alleviate pain; improve lumbosacral rhythm, loss lower lumbar ROM/mobility, flexibility, core/proximal hip strength, neural tension, posture, balance, and body mechanics   Date: 10/25/2023  TX#: 2/10 Date: 11/14/23                TX#: 3/10 Date:                 TX#: 4/ Date:                 TX#: 5/ Date:    Tx#: 6/   Ther ex:  LTR 10x  Supine SB iso with pelvic tilt 20x  Hamstring stretch 6 x 10 cts  Hooklying hip abd ER ([place]) 20x  R/L SLR 10x  Seated LAQ with SB R/L 10x5 cts  Narrow rows YTB 15x  Roll ball up wall 5x        Ther ex:  - Posterior pelvic tilt, cues for abdominal bracing and glute emphasis, 10x10s hold  - Wall hip flexor lunge stretch, 30s x 3        Manual:  NA Manual:  - Lumbar distraction, sacral tilt, sustained hold, 30s x 5  - UPA L5-S1 grade III, several minutes                    HEP: seated and wall pelvic tilts; SLR; hamstring stretch; bridge     Charges: 1 TE, 2 MT     Total Timed Treatment: MT 25', TE 15 min  Total Treatment Time: 40 min

## 2023-11-16 ENCOUNTER — OFFICE VISIT (OUTPATIENT)
Dept: PHYSICAL THERAPY | Age: 78
End: 2023-11-16
Attending: STUDENT IN AN ORGANIZED HEALTH CARE EDUCATION/TRAINING PROGRAM
Payer: MEDICARE

## 2023-11-16 ENCOUNTER — APPOINTMENT (OUTPATIENT)
Dept: PHYSICAL THERAPY | Age: 78
End: 2023-11-16
Payer: MEDICARE

## 2023-11-16 PROCEDURE — 97140 MANUAL THERAPY 1/> REGIONS: CPT

## 2023-11-16 PROCEDURE — 97110 THERAPEUTIC EXERCISES: CPT

## 2023-11-16 NOTE — PROGRESS NOTES
Dx: Neurogenic claudication due to lumbar spinal stenosis (H93.017)  History of lumbar fusion (Z98.1); Weakness of both lower extremities (R29.898); Numbness and tingling of foot (R20.0,R20.2)             Insurance (Authorized # of Visits): Medicare A&B (10 POC)           Authorizing Physician: Dr. Jeb Holder / Earnest Cuevas PAKeniaC Next MD visit: post therapy   Fall Risk: standard         Precautions: n/a             Subjective: Pt states that overall her symptoms have felt better since last session especially at the end of the day. PAS 0-1/10    Objective:   - muscle tightness noted at lumbar paraspinal muscles  - hypomobility with symptoms reproduction at L5-S1 on right  - Anterior pelvic tilt  - hip flexor tightness elsy      Assessment:  Performed manual therapy for lumbar spine mobility and pain modulation followed by hip flexor stretching. Initiated compound movement exercise today and patient tolerated well.     Goals:     Pt will improve transversus abdominis recruitment to perform proper isometric contraction without requiring verbal or tactile cuing to promote advancement of therex   Pt will demonstrate good understanding of proper posture and body mechanics to decrease pain and improve spinal safety   Pt will improve lumbar spine AROM flexion (finger tips to toes) to allow increase ease with bending forward to don shoes   Pt will demonstrate l/s extension past neutral necessary for upright trunk during gait as well as reaching overhead into cabinet   Pt will have decreased paraspinal mm tension to tolerate standing >30 minutes for work and home activities   Pt will demonstrate improved core strength to be able to bend and lift groceries from the floor without limitation    Pt will be independent and compliant with comprehensive HEP to maintain progress achieved in PT     Plan: alleviate pain; improve lumbosacral rhythm, loss lower lumbar ROM/mobility, flexibility, core/proximal hip strength, neural tension, posture, balance, and body mechanics   Date: 10/25/2023  TX#: 2/10 Date: 11/14/23                TX#: 3/10 Date:  11/16/23             TX#: 4/10 Date:                 TX#: 5/ Date:    Tx#: 6/   Ther ex:  LTR 10x  Supine SB iso with pelvic tilt 20x  Hamstring stretch 6 x 10 cts  Hooklying hip abd ER ([place]) 20x  R/L SLR 10x  Seated LAQ with SB R/L 10x5 cts  Narrow rows YTB 15x  Roll ball up wall 5x        Ther ex:  - Posterior pelvic tilt, cues for abdominal bracing and glute emphasis, 10x10s hold  - Wall hip flexor lunge stretch, 30s x 3   Ther ex:  - Chair squats, 2x10  - Sourav test position hip flexor stretch, 30s x3     Manual:  NA Manual:  - Lumbar distraction, sacral tilt, sustained hold, 30s x 5  - UPA L5-S1 grade III, several minutes Manual:  - Lumbar distraction, sacral tilt, sustained hold, 30s x 5  - UPA L5-S1 grade III, several minutes                   HEP: seated and wall pelvic tilts; SLR; hamstring stretch; bridge     Charges: 1 TE, 2 MT     Total Timed Treatment: MT 25', TE 15 min  Total Treatment Time: 40 min

## 2023-11-20 PROBLEM — M54.16 LUMBAR RADICULOPATHY: Status: ACTIVE | Noted: 2023-11-20

## 2023-11-21 ENCOUNTER — OFFICE VISIT (OUTPATIENT)
Dept: PHYSICAL THERAPY | Age: 78
End: 2023-11-21
Attending: STUDENT IN AN ORGANIZED HEALTH CARE EDUCATION/TRAINING PROGRAM
Payer: MEDICARE

## 2023-11-21 ENCOUNTER — APPOINTMENT (OUTPATIENT)
Dept: PHYSICAL THERAPY | Age: 78
End: 2023-11-21
Payer: MEDICARE

## 2023-11-21 PROCEDURE — 97140 MANUAL THERAPY 1/> REGIONS: CPT

## 2023-11-21 PROCEDURE — 97110 THERAPEUTIC EXERCISES: CPT

## 2023-11-21 NOTE — PROGRESS NOTES
Dx: Neurogenic claudication due to lumbar spinal stenosis (X42.555)  History of lumbar fusion (Z98.1); Weakness of both lower extremities (R29.898); Numbness and tingling of foot (R20.0,R20.2)             Insurance (Authorized # of Visits): Medicare A&B (10 POC)           Authorizing Physician: Dr. Isidoro Gottron / Rachele Jones PAKeniaC Next MD visit: post therapy   Fall Risk: standard         Precautions: n/a             Subjective: Pt states that she is a little sore in the low back today but it might be due to getting an injection yesterday. PAS 3/10    Objective:   - muscle tightness noted at lumbar paraspinal muscles  - hypomobility with symptoms reproduction at L5-S1 on right  - Anterior pelvic tilt  - hip flexor tightness elsy      Assessment:  Performed manual therapy for lumbar spine mobility and pain modulation followed by hip flexor stretching, hip stability and strength. Reported no back pain post treatment.     Goals:     Pt will improve transversus abdominis recruitment to perform proper isometric contraction without requiring verbal or tactile cuing to promote advancement of therex   Pt will demonstrate good understanding of proper posture and body mechanics to decrease pain and improve spinal safety   Pt will improve lumbar spine AROM flexion (finger tips to toes) to allow increase ease with bending forward to don shoes   Pt will demonstrate l/s extension past neutral necessary for upright trunk during gait as well as reaching overhead into cabinet   Pt will have decreased paraspinal mm tension to tolerate standing >30 minutes for work and home activities   Pt will demonstrate improved core strength to be able to bend and lift groceries from the floor without limitation    Pt will be independent and compliant with comprehensive HEP to maintain progress achieved in PT     Plan: alleviate pain; improve lumbosacral rhythm, loss lower lumbar ROM/mobility, flexibility, core/proximal hip strength, neural tension, posture, balance, and body mechanics   Date: 10/25/2023  TX#: 2/10 Date: 11/14/23                TX#: 3/10 Date:  11/16/23             TX#: 4/10 Date: 11/21/23                 TX#: 5/10 Date:    Tx#: 6/   Ther ex:  LTR 10x  Supine SB iso with pelvic tilt 20x  Hamstring stretch 6 x 10 cts  Hooklying hip abd ER ([place]) 20x  R/L SLR 10x  Seated LAQ with SB R/L 10x5 cts  Narrow rows YTB 15x  Roll ball up wall 5x        Ther ex:  - Posterior pelvic tilt, cues for abdominal bracing and glute emphasis, 10x10s hold  - Wall hip flexor lunge stretch, 30s x 3   Ther ex:  - Chair squats, 2x10  - Sourav test position hip flexor stretch, 30s x3 Ther ex:  - Prone alternating hip extension with abdominal bracing, 2x10 each  - Supine posterior pelvic tilt with resisted hip ER, rtb, 5s hold, 2x15  - Standing lunge hip flexor stretch, 30s x 3    Manual:  NA Manual:  - Lumbar distraction, sacral tilt, sustained hold, 30s x 5  - UPA L5-S1 grade III, several minutes Manual:  - Lumbar distraction, sacral tilt, sustained hold, 30s x 5  - UPA L5-S1 grade III, several minutes Manual:  - Lumbar distraction, sacral tilt, sustained hold, 30s x 5  - UPA L5-S1 grade III, several minutes                  HEP: seated and wall pelvic tilts; SLR; hamstring stretch; bridge     Charges: 2 TE, 1 MT     Total Timed Treatment: MT 15', TE 25 min  Total Treatment Time: 40 min

## 2023-11-24 ENCOUNTER — OFFICE VISIT (OUTPATIENT)
Dept: PHYSICAL THERAPY | Age: 78
End: 2023-11-24
Attending: FAMILY MEDICINE
Payer: MEDICARE

## 2023-11-24 ENCOUNTER — APPOINTMENT (OUTPATIENT)
Dept: PHYSICAL THERAPY | Age: 78
End: 2023-11-24
Payer: MEDICARE

## 2023-11-24 PROCEDURE — 97110 THERAPEUTIC EXERCISES: CPT

## 2023-11-24 PROCEDURE — 97140 MANUAL THERAPY 1/> REGIONS: CPT

## 2023-11-24 NOTE — PROGRESS NOTES
Dx: Neurogenic claudication due to lumbar spinal stenosis (N37.098)  History of lumbar fusion (Z98.1); Weakness of both lower extremities (R29.898); Numbness and tingling of foot (R20.0,R20.2)             Insurance (Authorized # of Visits): Medicare A&B (10 POC)           Authorizing Physician: Dr. Jenny Wells / Chase Denis PA-C Next MD visit: post therapy   Fall Risk: standard         Precautions: n/a             Subjective: Pt states that she is about 80% to desired condition and she has not had much pain since last session. She thinks it's a combination of the injection and therapy. PAS 0/10  Objective:   - muscle tightness noted at lumbar paraspinal muscles  - hypomobility with symptoms reproduction at L5-S1 on right  - Anterior pelvic tilt  - hip flexor tightness elsy      Assessment:  Performed manual therapy for lumbar spine mobility and pain modulation followed by hip stability and strength. Reported no back pain post treatment. Discussed importance of compliance with exercises to build strength and resilience.     Goals:     Pt will improve transversus abdominis recruitment to perform proper isometric contraction without requiring verbal or tactile cuing to promote advancement of therex   Pt will demonstrate good understanding of proper posture and body mechanics to decrease pain and improve spinal safety   Pt will improve lumbar spine AROM flexion (finger tips to toes) to allow increase ease with bending forward to don shoes   Pt will demonstrate l/s extension past neutral necessary for upright trunk during gait as well as reaching overhead into cabinet   Pt will have decreased paraspinal mm tension to tolerate standing >30 minutes for work and home activities   Pt will demonstrate improved core strength to be able to bend and lift groceries from the floor without limitation    Pt will be independent and compliant with comprehensive HEP to maintain progress achieved in PT     Plan: alleviate pain; improve lumbosacral rhythm, loss lower lumbar ROM/mobility, flexibility, core/proximal hip strength, neural tension, posture, balance, and body mechanics   Date: 10/25/2023  TX#: 2/10 Date: 11/14/23                TX#: 3/10 Date:  11/16/23             TX#: 4/10 Date: 11/21/23                 TX#: 5/10 Date: 11/24/23  Tx#: 6/10   Ther ex:  LTR 10x  Supine SB iso with pelvic tilt 20x  Hamstring stretch 6 x 10 cts  Hooklying hip abd ER ([place]) 20x  R/L SLR 10x  Seated LAQ with SB R/L 10x5 cts  Narrow rows YTB 15x  Roll ball up wall 5x        Ther ex:  - Posterior pelvic tilt, cues for abdominal bracing and glute emphasis, 10x10s hold  - Wall hip flexor lunge stretch, 30s x 3   Ther ex:  - Chair squats, 2x10  - Sourav test position hip flexor stretch, 30s x3 Ther ex:  - Prone alternating hip extension with abdominal bracing, 2x10 each  - Supine posterior pelvic tilt with resisted hip ER, rtb, 5s hold, 2x15  - Standing lunge hip flexor stretch, 30s x 3 Ther ex:  - Posterior pelvic tilt, cues for abdominal bracing and glute emphasis, 10x10s hold  - Chair squats, 2x10   Manual:  NA Manual:  - Lumbar distraction, sacral tilt, sustained hold, 30s x 5  - UPA L5-S1 grade III, several minutes Manual:  - Lumbar distraction, sacral tilt, sustained hold, 30s x 5  - UPA L5-S1 grade III, several minutes Manual:  - Lumbar distraction, sacral tilt, sustained hold, 30s x 5  - UPA L5-S1 grade III, several minutes Manual:  - Lumbar distraction, sacral tilt, sustained hold, 30s x 5  - UPA L5-S1 grade III, several minutes                 HEP: seated and wall pelvic tilts; SLR; hamstring stretch; bridge     Charges: 1 TE, 2 MT     Total Timed Treatment: TE 15', MT 25 min  Total Treatment Time: 40 min

## 2023-11-27 NOTE — TELEPHONE ENCOUNTER
Please review; protocol failed.    Requested Prescriptions   Pending Prescriptions Disp Refills    LOSARTAN 50 MG Oral Tab [Pharmacy Med Name: Losartan Potassium 50 Mg Tab [facility]] 90 tablet 0     Sig: TAKE ONE TABLET BY MOUTH ONE TIME DAILY       Hypertensive Medications Protocol Failed - 11/26/2023  9:23 AM        Failed - Last BP reading less than 140/90     BP Readings from Last 1 Encounters:   11/20/23 140/69               Passed - In person appointment in the past 12 or next 3 months     Recent Outpatient Visits              3 days ago     [address] [facility], [place], [place]    Office Visit    6 days ago     [address] [facility], [place], [place]    Office Visit    1 week ago     [address] [place], [place]    Office Visit    1 week ago     [address] [place], [place]    Office Visit    1 month ago     [address] [place], [place]    Office Visit          Future Appointments         Provider Department Appt Notes    Tomorrow Gretchen Kingsley, [address] Medicare/BCBS Suppl    In 3 months Jemma Nesbitt MD [facility], [address] [place] [address] Rd,[address], [place] MEDICARE  NON SX F/U               Passed - CMP or BMP in past 6 months     Recent Results (from the past 4392 hour(s))   CMP    Collection Time: 09/07/23  3:21 PM   Result Value Ref Range    Glucose 83 70 - 99 mg/dL    Sodium 144 136 - 145 mmol/L    Potassium 3.6 3.5 - 5.1 mmol/L    Chloride 112 98 - 112 mmol/L    CO2 28.0 21.0 - 32.0 mmol/L    Anion Gap 4 0 - 18 mmol/L    BUN 13 7 - 18 mg/dL    Creatinine 1.12 (H) 0.55 - 1.02 mg/dL    BUN/CREA Ratio 11.6 10.0 - 20.0    Calcium, Total 9.5 8.5 - 10.1 mg/dL    Calculated Osmolality 297 (H) 275 - 295 mOsm/kg    eGFR-Cr 51 (L) >=60 mL/min/1.73m2    ALT 22 13 - 56 U/L    AST 22 15 - 37 U/L    Alkaline Phosphatase 90 55 - 142 U/L    Bilirubin, Total 0.4 0.1 - 2.0 mg/dL    Total Protein 7.3 6.4 - 8.2 g/dL    Albumin 4.2 3.4 - 5.0 g/dL    Globulin  3.1 2.8 - 4.4 g/dL    A/G Ratio 1.4 1.0 - 2.0    Patient Fasting for CMP? No      *Note: Due to a large number of results and/or encounters for the requested time period, some results have not been displayed. A complete set of results can be found in Results Review.                Passed - In person appointment or virtual visit in the past 6 months     Recent Outpatient Visits              3 days ago     [address] [place], [place]    Office Visit    6 days ago     [address] [place], [place]    Office Visit    1 week ago     [address] [place], [place]    Office Visit    1 week ago     [address] [place], [place]    Office Visit    1 month ago     [address] [email or web address] [place], [place]    Office Visit          Future Appointments         Provider Department Appt Notes    Tomorrow Liane [place], [address] Medicare/BCBS Suppl    In 3 months Emily Blackwood MD [facility], [address], [place] MEDICARE  NON SX F/U               Passed - [facility] or GFRAA > 50     GFR Evaluation  EGFRCR: 51 , resulted on 9/7/2023             Recent Outpatient Visits              3 days ago     [facility], [place]    Office Visit    6 days ago     [address] [place], [place]    Office Visit    1 week ago     [address] [place], [place]    Office Visit    1 week ago     [address] [place], [place]    Office Visit    1 month ago     [address] [place], [place]    Office Visit          Future Appointments         Provider Department Appt Notes    Tomorrow [facility], [address] Medicare/BCBS Suppl    In 3 months Catina Sinclair MD [facility], [address] [place] [address],[address], [place] MEDICARE  NON SX F/U

## 2023-11-28 ENCOUNTER — OFFICE VISIT (OUTPATIENT)
Dept: PHYSICAL THERAPY | Age: 78
End: 2023-11-28
Attending: STUDENT IN AN ORGANIZED HEALTH CARE EDUCATION/TRAINING PROGRAM
Payer: MEDICARE

## 2023-11-28 PROCEDURE — 97140 MANUAL THERAPY 1/> REGIONS: CPT

## 2023-11-28 PROCEDURE — 97110 THERAPEUTIC EXERCISES: CPT

## 2023-11-28 NOTE — PROGRESS NOTES
Dx: Neurogenic claudication due to lumbar spinal stenosis (U52.115)  History of lumbar fusion (Z98.1); Weakness of both lower extremities (R29.898); Numbness and tingling of foot (R20.0,R20.2)             Insurance (Authorized # of Visits): Medicare A&B (10 POC)           Authorizing Physician: Dr. Manuel Reza / Sammie Angeles PA-C Next MD visit: post therapy   Fall Risk: standard         Precautions: n/a            Discharge Summary  Pt has attended 7 visits in Physical Therapy. Subjective: Pt states that she had some pain this morning, but she was able manage it with home exercises and activity modifications  Assessment:  Performed manual therapy for lumbar spine mobility and pain modulation followed by hip stability and strength. Reported no back pain post treatment. Discussed importance of compliance with exercises to build strength and resilience.     PAS 2/10  Objective:   - muscle tightness noted at lumbar paraspinal muscles  - hypomobility with symptoms reproduction at L5-S1 on right  - Anterior pelvic tilt  - hip flexor tightness elsy    Goals:     Pt will improve transversus abdominis recruitment to perform proper isometric contraction without requiring verbal or tactile cuing to promote advancement of therex - MET  Pt will demonstrate good understanding of proper posture and body mechanics to decrease pain and improve spinal safety - MET  Pt will improve lumbar spine AROM flexion (finger tips to toes) to allow increase ease with bending forward to don shoes - MET  Pt will demonstrate l/s extension past neutral necessary for upright trunk during gait as well as reaching overhead into cabinet - MET  Pt will have decreased paraspinal mm tension to tolerate standing >30 minutes for work and home activities - MET  Pt will demonstrate improved core strength to be able to bend and lift groceries from the floor without limitation  - MET  Pt will be independent and compliant with comprehensive HEP to maintain progress achieved in PT - MET    Post Oswestry Disability Index Score  Post Score: 12 % (11/28/2023  1:22 PM)    32 % improvement    Plan: Discharge from PT     Patient/Family/Caregiver was advised of these findings, precautions, and treatment options and has agreed to actively participate in planning and for this course of care. Thank you for your referral. If you have any questions, please contact me at Dept: [phone]. Sincerely,  Electronically signed by therapist: Manpreet Romero PT     Physician's certification required:  No  Please co-sign or sign and return this letter via fax as soon as possible to [phone]. I certify the need for these services furnished under this plan of treatment and while under my care.     X___________________________________________________ Date____________________    Certification From: [id number]  To:2/26/2024     Date: 10/25/2023  TX#: 2/10 Date: 11/14/23                TX#: 3/10 Date:  11/16/23             TX#: 4/10 Date: 11/21/23                 TX#: 5/10 Date: 11/24/23  Tx#: 6/10 Date: 11/28/23  Tx#: 7/10   Ther ex:  LTR 10x  Supine SB iso with pelvic tilt 20x  Hamstring stretch 6 x 10 cts  Hooklying hip abd ER ([place]) 20x  R/L SLR 10x  Seated LAQ with SB R/L 10x5 cts  Narrow rows YTB 15x  Roll ball up wall 5x        Ther ex:  - Posterior pelvic tilt, cues for abdominal bracing and glute emphasis, 10x10s hold  - Wall hip flexor lunge stretch, 30s x 3   Ther ex:  - Chair squats, 2x10  - Sourav test position hip flexor stretch, 30s x3 Ther ex:  - Prone alternating hip extension with abdominal bracing, 2x10 each  - Supine posterior pelvic tilt with resisted hip ER, rtb, 5s hold, 2x15  - Standing lunge hip flexor stretch, 30s x 3 Ther ex:  - Posterior pelvic tilt, cues for abdominal bracing and glute emphasis, 10x10s hold  - Chair squats, 2x10 TA:  - Heavy lifting, floor to waist:  #30, #50, 1 rep // no increase in pain  - Educated patient on lifting technique and bodies resiliency   Manual:  NA Manual:  - Lumbar distraction, sacral tilt, sustained hold, 30s x 5  - UPA L5-S1 grade III, several minutes Manual:  - Lumbar distraction, sacral tilt, sustained hold, 30s x 5  - UPA L5-S1 grade III, several minutes Manual:  - Lumbar distraction, sacral tilt, sustained hold, 30s x 5  - UPA L5-S1 grade III, several minutes Manual:  - Lumbar distraction, sacral tilt, sustained hold, 30s x 5  - UPA L5-S1 grade III, several minutes                    HEP: seated and wall pelvic tilts; SLR; hamstring stretch; bridge     Charges: 1 TE, 2 MT     Total Timed Treatment: TE 15', MT 25 min  Total Treatment Time: 40 min

## 2023-11-30 ENCOUNTER — APPOINTMENT (OUTPATIENT)
Dept: PHYSICAL THERAPY | Age: 78
End: 2023-11-30
Attending: FAMILY MEDICINE
Payer: MEDICARE

## 2023-12-01 RX ORDER — LOSARTAN POTASSIUM 50 MG/1
50 TABLET ORAL DAILY
Qty: 90 TABLET | Refills: 0 | Status: SHIPPED | OUTPATIENT
Start: 2023-12-01

## 2023-12-03 DIAGNOSIS — E66.9 OBESITY (BMI 30-39.9): ICD-10-CM

## 2023-12-04 RX ORDER — TOPIRAMATE 25 MG/1
25 TABLET ORAL DAILY
Qty: 90 TABLET | Refills: 0 | Status: SHIPPED | OUTPATIENT
Start: 2023-12-04

## 2023-12-15 RX ORDER — FLUTICASONE PROPIONATE 50 MCG
SPRAY, SUSPENSION (ML) NASAL
Qty: 3 EACH | Refills: 3 | Status: SHIPPED | OUTPATIENT
Start: 2023-12-15

## 2023-12-15 NOTE — TELEPHONE ENCOUNTER
Refill passed per [facility] protocol.     Requested Prescriptions   Pending Prescriptions Disp Refills    FLUTICASONE PROPIONATE 50 MCG/ACT Nasal Suspension [Pharmacy Med Name: Fluticasone Propionate 50 Mcg/Act Spr Hikm] 48 g 0     Sig: SPRAY TWICE IN EACH NOSTRIL DAILY       Allergy Medication Protocol Passed - 12/14/2023 10:25 AM        Passed - In person appointment or virtual visit in the past 12 mos or appointment in next 3 mos     Recent Outpatient Visits              2 weeks ago     [facility] [place], [facility]    Office Visit    3 weeks ago     [facility] [place], [facility]    Office Visit    3 weeks ago     [facility] [place], PT    Office Visit    4 weeks ago     [facility] [place], PT    Office Visit    1 month ago     [facility] [place], PT    Office Visit          Future Appointments         Provider Department Appt Notes    In 2 months Jose Elias Templeton MD [facility], [address], [place] MEDICARE  NON SX F/U                    [unfilled]      @[email or web address]

## 2024-02-06 RX ORDER — ACETAMINOPHEN AND CODEINE PHOSPHATE 120; 12 MG/5ML; MG/5ML
0.35 SOLUTION ORAL DAILY
Qty: 84 TABLET | Refills: 0 | OUTPATIENT
Start: 2024-02-06

## 2024-02-09 ENCOUNTER — MED REC SCAN ONLY (OUTPATIENT)
Dept: PHYSICAL MEDICINE AND REHAB | Facility: CLINIC | Age: 79
End: 2024-02-09

## 2024-02-21 ENCOUNTER — LAB ENCOUNTER (OUTPATIENT)
Dept: LAB | Facility: HOSPITAL | Age: 79
End: 2024-02-21
Attending: FAMILY MEDICINE
Payer: MEDICARE

## 2024-02-21 ENCOUNTER — OFFICE VISIT (OUTPATIENT)
Dept: SURGERY | Facility: CLINIC | Age: 79
End: 2024-02-21
Payer: MEDICARE

## 2024-02-21 VITALS
HEART RATE: 90 BPM | SYSTOLIC BLOOD PRESSURE: 122 MMHG | BODY MASS INDEX: 31.65 KG/M2 | DIASTOLIC BLOOD PRESSURE: 68 MMHG | WEIGHT: 190 LBS | HEIGHT: 65 IN

## 2024-02-21 DIAGNOSIS — N28.9 RENAL INSUFFICIENCY: ICD-10-CM

## 2024-02-21 DIAGNOSIS — M51.36 LUMBAR DEGENERATIVE DISC DISEASE: ICD-10-CM

## 2024-02-21 DIAGNOSIS — M48.062 NEUROGENIC CLAUDICATION DUE TO LUMBAR SPINAL STENOSIS: Primary | ICD-10-CM

## 2024-02-21 DIAGNOSIS — R29.898 WEAKNESS OF BOTH LOWER EXTREMITIES: ICD-10-CM

## 2024-02-21 DIAGNOSIS — M40.30 POSTPROCEDURAL FLAT BACK SYNDROME: ICD-10-CM

## 2024-02-21 DIAGNOSIS — M47.816 LUMBAR FACET ARTHROPATHY: ICD-10-CM

## 2024-02-21 DIAGNOSIS — Z98.1 HISTORY OF LUMBAR FUSION: ICD-10-CM

## 2024-02-21 LAB
ALBUMIN SERPL-MCNC: 4.2 G/DL (ref 3.2–4.8)
ANION GAP SERPL CALC-SCNC: 7 MMOL/L (ref 0–18)
BUN BLD-MCNC: 16 MG/DL (ref 9–23)
BUN/CREAT SERPL: 16 (ref 10–20)
CALCIUM BLD-MCNC: 9.5 MG/DL (ref 8.7–10.4)
CHLORIDE SERPL-SCNC: 112 MMOL/L (ref 98–112)
CO2 SERPL-SCNC: 26 MMOL/L (ref 21–32)
CREAT BLD-MCNC: 1 MG/DL
EGFRCR SERPLBLD CKD-EPI 2021: 58 ML/MIN/1.73M2 (ref 60–?)
GLUCOSE BLD-MCNC: 102 MG/DL (ref 70–99)
OSMOLALITY SERPL CALC.SUM OF ELEC: 301 MOSM/KG (ref 275–295)
PHOSPHATE SERPL-MCNC: 3.2 MG/DL (ref 2.4–5.1)
POTASSIUM SERPL-SCNC: 4 MMOL/L (ref 3.5–5.1)
SODIUM SERPL-SCNC: 145 MMOL/L (ref 136–145)

## 2024-02-21 PROCEDURE — 99213 OFFICE O/P EST LOW 20 MIN: CPT | Performed by: STUDENT IN AN ORGANIZED HEALTH CARE EDUCATION/TRAINING PROGRAM

## 2024-02-21 PROCEDURE — 80069 RENAL FUNCTION PANEL: CPT

## 2024-02-21 PROCEDURE — 36415 COLL VENOUS BLD VENIPUNCTURE: CPT

## 2024-02-21 NOTE — PROGRESS NOTES
Established patient:  Reason for follow up: review imaging     States her pain is a 3/10     States the pain get worst when she is walking a lot and in the first part of the day    New imaging or testing since your last office visit:  imaging in chart

## 2024-02-21 NOTE — PROGRESS NOTES
Established Neurosurgery Patient    Patient: Anna Dalton  Medical Record Number: [id number]  YOB: 1945  PCP: Estuardo Lopes DO    Reason for visit: Neurogenic claudication    HISTORY OF PRESENTING ILLNESS:  Anna Dalton is a pleasant 78 year old female with a pertinent surgical history of a microdiscectomy in 2014 and a L2-5 fusion in 2015 by an outside orthopedic spine surgeon, who returns to the neurosurgery clinic today for continued evaluation of her neurogenic claudication.  Briefly, the patient was last evaluated by the neurosurgery team on 9/18/2023 for bilateral low back pain with radiation into her bilateral hips, left greater than right, with intermittent radiation into her posterolateral thigh, as well as heaviness, achiness, and fatigue in her extremities with ambulation.  This was improved with rest.  She had a caudal JACQUELINE on 4/11/2023 with Dr. Loo and had good initial relief, but then her symptoms returned.  Her physical exam at that time demonstrated weakness in bilateral dorsiflexion and EHL.  She had further diagnostic imaging ordered at this office visit, which includes a lumbar MRI, a CT lumbar spine, and XR scoli films.  She was also encouraged to follow-up with Dr. Loo of physiatry and underwent another caudal epidural steroid injection on 11/20/2023.  She has also been participating in physical therapy, although the records show that her last PT visit was on 11/28/2023.  Today, the patient states that her symptoms have not significantly changed from last office visit.  She states that she received approximately 75 to 80% relief with her most recent injection, but this only lasted until the end of January when her symptoms returned and full.  She does not have any numbness or tingling in her lower extremities bilaterally.  No new bowel/bladder incontinence or saddle anesthesia.  She does describe new foot pain, left greater than right, that is  intermittent in nature and particularly involved her great toe.  She does not experience that pain today.  Her back continues to be more bothersome than her lower extremities.  She continues to describe symptoms consistent with neurogenic claudication.  Her symptoms are exacerbated with walking in particular and improved with rest, but never fully go away.    Past Medical History:   Diagnosis Date    Arthritis     left shoulder     Back problem     Cervical disc disease     Colon polyp     Esophageal reflux     High blood pressure     High cholesterol     HTN (hypertension) 2015    Obesity (BMI 30-39.9)     Osteoarthritis     Other and unspecified hyperlipidemia     Unspecified essential hypertension     Uterine fibroid       Past Surgical History:   Procedure Laterality Date      1972    COLONOSCOPY  2015    COLONOSCOPY      HYSTERECTOMY      IR EPIDURAL STERIOD INJECTION      LUMBAR SPINE FUSION COMBINED      SPINE SURGERY PROCEDURE UNLISTED  2014    Lumbar microdiscectomy    TOTAL ABDOMINAL HYSTERECTOMY      w/ BSO    TUBAL LIGATION        Family History   Problem Relation Age of Onset    Diabetes Father     Hypertension Mother     Lipids Mother     Heart Disorder Mother     Breast Cancer Sister         in her 30's.    Cancer Other         Uncles with lung cancer      Social History     Socioeconomic History    Marital status:     Number of children: 5   Occupational History    Occupation:      Comment: retired   Tobacco Use    Smoking status: Every Day     Packs/day: 0.50     Years: 15.00     Additional pack years: 0.00     Total pack years: 7.50     Types: Cigarettes     Start date: 2017    Smokeless tobacco: Never    Tobacco comments:     currently every day smoker, 5-6 cigs/day   Vaping Use    Vaping Use: Never used   Substance and Sexual Activity    Alcohol use: Yes     Alcohol/week: 0.0 standard drinks of alcohol     Comment: occasionally    Drug use: No    Other Topics Concern    Caffeine Concern Yes     Comment: 2 cups tea/day    Exercise Yes     Comment: chair exercise class 1x/week, walks stairs    Reaction to local anesthetic No      No Known Allergies   Current Medications:  Current Outpatient Medications   Medication Sig Dispense Refill    fluticasone propionate 50 MCG/ACT Nasal Suspension SPRAY TWICE IN EACH NOSTRIL DAILY 3 each 3    topiramate 25 MG Oral Tab Take 1 tablet (25 mg total) by mouth daily. 90 tablet 0    losartan 50 MG Oral Tab Take 1 tablet (50 mg total) by mouth daily. 90 tablet 0    hydroCHLOROthiazide 12.5 MG Oral Tab Take 1 tablet (12.5 mg total) by mouth daily. 90 tablet 1    amLODIPine 5 MG Oral Tab Take 1 tablet (5 mg total) by mouth daily. 90 tablet 3    Phentermine HCl 30 MG Oral Cap Take 1 capsule (30 mg total) by mouth every morning. 30 capsule 3    gabapentin 300 MG Oral Cap Take 2 capsules  by mouth in the morning, at noon, and at bedtime. 540 capsule 2    lansoprazole 30 MG Oral Capsule Delayed Release TAKE ONE CAPSULE BY MOUTH EVERY MORNING BEFORE BREAKFAST 90 capsule 3    clotrimazole-betamethasone 1-0.05 % External Cream Apply 1 Application topically 2 (two) times daily as needed. 45 g 3    levocetirizine 5 MG Oral Tab TAKE ONE TABLET BY MOUTH EVERY DAY AT BEDTIME 90 tablet 3    Norethindrone (SHAROBEL) 0.35 MG Oral Tab Take 1 tablet (0.35 mg total) by mouth daily. 84 tablet 3    METHOCARBAMOL 750 MG Oral Tab Take 1 tablet  by mouth Daily as needed (muscle tightness/spasms). 90 tablet 0    NAPROXEN 500 MG Oral Tab TAKE ONE TABLET BY MOUTH TWICE DAILY AS NEEDED 60 tablet 0    clindamycin 1 % External Lotion Apply 1 Application. topically 2 (two) times daily. 60 mL 1    diazePAM 2 MG Oral Tab 1 tab PO 1 hour before MRI; 1 tab PO 30 minutes before MRI PRN claustrophobia. Must have . 2 tablet 0    diclofenac 1 % External Gel Apply 4 g topically 4 (four) times daily. 1 each 0    Acetaminophen  MG Oral Tab CR Take 1 tablet  (650 mg total) by mouth every 8 (eight) hours as needed for Pain.      acyclovir 5 % External Ointment Apply 1 g topically as needed. 30 g 0    ASPIRIN EC LOW DOSE 81 MG Oral Tab EC TAKE 1 TABLET BY MOUTH DAILY. 30 tablet 3    simvastatin 20 MG Oral Tab Take 1 tablet (20 mg total) by mouth nightly.      hydrocortisone 1 % External Cream Apply 1 Application topically 2 (two) times daily. 30 g 0    Probiotic Product (ACIDOPHILUS PROBIOTIC BLEND OR) Take  by mouth 2 (two) times daily.      Multiple Vitamins-Minerals (MULTI-VITAMIN/MINERALS) Oral Tab Take 1 tablet by mouth daily.          REVIEW OF SYSTEMS:  Comprehensive review of systems completed and negative with the exception of aforementioned information in the HPI.     PHYSICAL EXAM:    2/21/2024 11:46 AM    /68   BP Location Right arm   Patient Position Sitting   Cuff Size adult   Pulse 90   Weight 190 lb (86.2 kg)   Height 65\"    Other Vitals   BMI 31.62 kg/m2   BSA 1.94 m2   Menstrual status Hysterectomy   OB/Gyn status reviewed 11/11/2023   Tobacco   Smoking status Every Day   Uses Cigarettes   Packs/day 0.5   Years 15   Pack years 7.5   Ready to quit? Not Answered   Counseling given? Not Answered   Smokeless status Never   Comment currently every day smoker, 5-6 cigs/day   Reviewed 2/21/2024        Wt Readings from Last 6 Encounters:   11/11/23 190 lb (86.2 kg)   10/31/23 190 lb (86.2 kg)   10/17/23 195 lb (88.5 kg)   10/17/23 190 lb (86.2 kg)   09/18/23 190 lb (86.2 kg)   09/07/23 194 lb (88 kg)        General: Well developed, well nourished, in no acute distress. Ambulates with a cane.     HEENT: Normocephalic, atraumatic.    Respirations: Non-labored     Neurologic / Musculoskeletal: Awake, alert, and interactive. Recent and remote memory appear intact. Attention span and concentration are appropriate. No dysarthria. Appropriately names objects. Coordination and motor control grossly intact. Patient follows commands briskly and appropriately.      Cervical Spine:    Motor/ Extremities   Deltoid Biceps Triceps    Sensation   R 5/5 5/5 5/5 5/5 Intact to light touch   L 5/5 5/5 5/5 5/5 Intact to light touch       Lumbar Spine:    Motor / Extremities    Hip   flexion Knee   extension Dorsiflexion EHL Plantarflexion    Sensation   R 5/5 5/5 4+/5 4+/5 5/5 Intact to light touch   L 5/5 5/5 4+/5 4/5 5/5 Intact to light touch        IMAGING:    CT spine lumbar 9/26/2023  CONCLUSION:      1. Postoperative changes of L2-L5 posterior lumbar fusion.  Hardware appears intact and uncomplicated.  Near complete fusion/arthrosis at the postsurgical levels.      2. Multilevel degenerative changes of the lumbar spine as detailed.  Overall findings appear similar to perhaps mildly progressed (particularly at L1-L2) as compared with lumbar spine MR from August, 2021.  Notable levels as follows:      3. L1-L2:  Moderate to severe left and moderate right neural foraminal with mild-to-moderate spinal canal stenosis.   4. L2-L3:  Moderate left and mild-to-moderate right neural foraminal stenosis.   5. L3-L4:  Mild-to-moderate right greater than left neural foraminal stenosis.   6. L4-L5:  Mild-to-moderate right greater than left neural foraminal stenosis.   7. L5-S1:  Mild bilateral neural foraminal and minor spinal canal stenosis.      8. Trace degenerative anterolisthesis of L1 relative to L2.      9. Sacralization of L5 bilaterally.      10. Lesser incidental findings as above.      elm-remote      Dictated by (CST): Wei Byrnes MD on 9/26/2023 at 1:17 PM       Finalized by (CST): Wei Byrnes MD on 9/26/2023 at 1:20 PM     Scoliosis spine 2-3 views 9/26/2023  CONCLUSION:   Minimal S-shaped scoliosis of the thoracolumbar spine, without measurable component.      Changes of prior posterior L2-S1 posterior spinal fusion with interbody spacers calming without radiographic evidence of hardware failure.      Advanced degenerative disc disease at L1-L2.      A few bowel  loops which demonstrate layering air-fluid levels.  While findings can be seen in setting of early/partial obstruction, layering of debris related to recent meal ingestion would be a differential consideration.  Correlate with symptoms.  If   there is concern, further assessment should be made with CT abdomen pelvis.       Dictated by (CST): Bree Scales MD on 9/26/2023 at 10:15 AM       Finalized by (CST): Bree Scales MD on 9/26/2023 at 10:21 AM     MRI spine lumbar 11/6/2023  CONCLUSION:   1. L1-2:  Advanced more pronounced disc degeneration.  Minimal anterolisthesis.  Mild-to-moderate central narrowing.  Moderate left and mild-to-moderate right foraminal narrowing.   2. Posterior and interbody fusion L2 through L4.  Mild-to-moderate asymmetric central narrowing at L2-3.  Mild central narrowing L3-4.  No high-grade central narrowing or foraminal narrowing.   3. Disc degeneration at L1-2 is more pronounced.  Otherwise no significant change compared with 2021 .      Dictated by (CST): Kobe Kendrick MD on 11/06/2023 at 9:25 PM       Finalized by (CST): Kobe Kendrick MD on 11/06/2023 at 9:35 PM     Imaging reviewed.  Images shown to Dr. Rivera prior to clinic.  Images reviewed in detail with the patient in clinic today.    ASSESSMENT / PLAN:    ICD-10-CM   1. Neurogenic claudication due to lumbar spinal stenosis  M48.062      2. History of lumbar fusion  Z98.1      3. Postprocedural flat back syndrome  M40.30      4. Lumbar facet arthropathy  M47.816      5. Weakness of both lower extremities  R29.898      6. Lumbar degenerative disc disease  M51.36        Anna Dalton returns to the clinic today for continued evaluation of neurogenic claudication.  Her last epidural steroid injection was done by Dr. Loo on 11/20/2023.  She had reasonable relief following the injection, but this only lasted a couple of months and her symptoms returned in full force.  She continues to endorse symptoms  consistent with neurogenic claudication.  Physical exam remains grossly stable with bilateral dorsiflexion and EHL weakness. Her CT lumbar spine does not demonstrate any concern for hardware failure.  Fusion at the surgical levels is noted.  Her lumbar MRI demonstrates multilevel, multifactorial stenosis with varying degrees of severity.  Her scoli films demonstrate an iatrogenic flatback deformity. Imaging was reviewed with Dr. Rivera and Dr. Hansen. SVA approximately 13cm.   The patient would likely need an extensive deformity correction, which currently exceeds the capabilities at [facility] and therefore, it is recommended that the patient follow-up with a neurosurgeon at [facility] within our system to further discuss potential surgical options with the patient.    -Medications Prescribed: None  -Imaging Ordered: None  -Referrals Placed: Dr. Pan Martin, neurosurgeon at [facility] for discussion regarding deformity correction  -Follow up: As needed    Plan was reviewed and discussed in detail with the patient. Patient encouraged to call the office with any questions or concerns of new/worsening neurologic symptoms. Patient demonstrated good understanding and was agreeable with the plan.     Visit time: 25 minutes   Over 50% of that time was spent providing patient education and discussing care plan.    Giovanny Noguera PA-C  Physician Assistant- Neurosurgery   [facility]  2/21/2024, 11:16 AM

## 2024-02-21 NOTE — PATIENT INSTRUCTIONS
Refill policies:    Allow 2-3 business days for refills; controlled substances may take longer.  Contact your pharmacy at least 5 days prior to running out of medication and have them send an electronic request or submit request through the “request refill” option in your [employer] account.  Refills are not addressed on weekends; covering physicians do not authorize routine medications on weekends.  No narcotics or controlled substances are refilled after noon on Fridays or by on call physicians.  By law, narcotics must be electronically prescribed.  A 30 day supply with no refills is the maximum allowed.  If your prescription is due for a refill, you may be due for a follow up appointment.  To best provide you care, patients receiving routine medications need to be seen at least once a year.  Patients receiving narcotic/controlled substance medications need to be seen at least once every 3 months.  In the event that your preferred pharmacy does not have the requested medication in stock (e.g. Backordered), it is your responsibility to find another pharmacy that has the requested medication available.  We will gladly send a new prescription to that pharmacy at your request.    Scheduling Tests:    If your physician has ordered radiology tests such as MRI or CT scans, please contact Central Scheduling at [phone] right away to schedule the test.  Once scheduled, the [facility] Centralized Referral Team will work with your insurance carrier to obtain pre-certification or prior authorization.  Depending on your insurance carrier, approval may take 3-10 days.  It is highly recommended patients assure they have received an authorization before having a test performed.  If test is done without insurance authorization, patient may be responsible for the entire amount billed.      Precertification and Prior Authorizations:  If your physician has recommended that you have a procedure or additional testing performed the [facility]  Centralized Referral Team will contact your insurance carrier to obtain pre-certification or prior authorization.    You are strongly encouraged to contact your insurance carrier to verify that your procedure/test has been approved and is a COVERED benefit.  Although the [facility] Centralized Referral Team does its due diligence, the insurance carrier gives the disclaimer that \"Although the procedure is authorized, this does not guarantee payment.\"    Ultimately the patient is responsible for payment.   Thank you for your understanding in this matter.  Paperwork Completion:  If you require FMLA or disability paperwork for your recovery, please make sure to either drop it off or have it faxed to our office at [phone]. Be sure the form has your name and date of birth on it.  The form will be faxed to our Forms Department and they will complete it for you.  There is a 25$ fee for all forms that need to be filled out.  Please be aware there is a 10-14 day turnaround time.  You will need to sign a release of information ([place]) form if your paperwork does not come with one.  You may call the Forms Department with any questions at [phone].  Their fax number is [phone].

## 2024-02-28 RX ORDER — LOSARTAN POTASSIUM 50 MG/1
50 TABLET ORAL DAILY
Qty: 90 TABLET | Refills: 3 | Status: SHIPPED | OUTPATIENT
Start: 2024-02-28

## 2024-02-28 NOTE — TELEPHONE ENCOUNTER
Refill passed per [facility] protocol.  Requested Prescriptions   Pending Prescriptions Disp Refills    losartan 50 MG Oral Tab 90 tablet 3     Sig: Take 1 tablet (50 mg total) by mouth daily.       Hypertension Medications Protocol Passed - 2/28/2024 10:21 AM        Passed - CMP or BMP in past 12 months        Passed - Last BP reading less than 140/90     BP Readings from Last 1 Encounters:   02/21/24 122/68               Passed - In person appointment or virtual visit in the past 12 mos or appointment in next 3 mos     Recent Outpatient Visits              1 week ago [facility] to [facility]    [facility]    3 months [facility] Holland Castano, PT    Office Visit    3 months ago     [facility] Holland Castano, PT    Office Visit    3 months ago     [facility] Holland Castano, PT    Office Visit    3 months ago     [facility] Holland Castano, PT    Office Visit          Future Appointments         Provider Department Appt Notes    In 2 days Sher Loo DO [facility] f/up    In 1 week Jose Elias Templeton MD [facility] [address], [place] MEDICARE  NON SX F/U               Passed - EGFRCR or GFRAA > 50     GFR Evaluation  EGFRCR: 58 , resulted on 2/21/2024             Recent Outpatient Visits              1 week ago Neurogenic claudication due to lumbar spinal stenosis    [facility]    Office Visit    3 months ago     [facility] Holland Castano, PT    Office Visit    3 months ago     [facility] Holland Castano, PT    Office Visit    3 months ago      [facility] Holland Castano, PT    Office Visit    3 months ago     [facility] Holland Castano, PT    Office Visit          Future Appointments         Provider Department Appt Notes    In 2 days Sher Loo DO [place] f/up    In 1 week Jose Elias Templeton MD [place] MEDICARE  NON SX F/U

## 2024-02-28 NOTE — TELEPHONE ENCOUNTER
losartan 50 MG Oral Tab, Take 1 tablet (50 mg total) by mouth daily., Disp: 90 tablet, Rfl: 0

## 2024-03-01 ENCOUNTER — OFFICE VISIT (OUTPATIENT)
Dept: PHYSICAL MEDICINE AND REHAB | Facility: CLINIC | Age: 79
End: 2024-03-01
Payer: MEDICARE

## 2024-03-01 VITALS — BODY MASS INDEX: 31.65 KG/M2 | HEIGHT: 65 IN | WEIGHT: 190 LBS

## 2024-03-01 DIAGNOSIS — M48.062 SPINAL STENOSIS OF LUMBAR REGION WITH NEUROGENIC CLAUDICATION: Primary | ICD-10-CM

## 2024-03-01 PROCEDURE — 99214 OFFICE O/P EST MOD 30 MIN: CPT | Performed by: PHYSICAL MEDICINE & REHABILITATION

## 2024-03-01 RX ORDER — DULOXETIN HYDROCHLORIDE 20 MG/1
20 CAPSULE, DELAYED RELEASE ORAL DAILY
Qty: 30 CAPSULE | Refills: 0 | Status: SHIPPED
Start: 2024-03-01

## 2024-03-02 DIAGNOSIS — E66.9 OBESITY (BMI 30-39.9): ICD-10-CM

## 2024-03-02 NOTE — PROGRESS NOTES
Progress note    C/C:   Chief Complaint   Patient presents with    Follow - Up     LOV 10/17/23 pt is here for a follow up. MRI of spine lumbar was completed 1/6/23. No N/T.  Takes gabapentin. Finished physical therapy around thanksgiving . Pain 3/10      HPI: 78 year old female presents for follow up. Caudal JACQUELINE helped for about 2 months at most, and she has been having increasing lower back pain with a sense of heaviness in the legs that worsens the more she stands and walks. Seen by neurosurgery, due to how complex a surgery would be she was recommended to see Dr. Martin at [facility]. She has not yet been seen for consultation and is debating if she would want to undergo surgery if it were offered to her. She continues take gabapentin, and follows with the weight loss clinic, on phentermine for weight loss.     Pertinent allergies: No Known Allergies     Physical exam:  Ht 65\"   Wt 190 lb (86.2 kg)   BMI 31.62 kg/m²      Lumbar spine exam:    No skin rash lumbar/upper sacral region  No pain with lumbar flexion. + pain with lumbar extension.  5/5 LE strength b/l  SILT b/l LE  2/4 quad, gastrocs reflexes b/l  Facet loading negative b/l  Seated slump test negative  SLR negative b/l    Imaging: No new imaging to review    Assessment and plan  Lumbar stenosis with neurogenic claudication  History of lumbar fusion; transitional anatomy.    Treatment options are duloxetine for the back pain and neurogenic claudication, or bilateral S1 TFESI under fluoroscopy. She will start duloxetine 20mg qDaily; would not recommend increasing to more than 20mg. If she has any side effects while taking the duloxetine she was instructed to stop the medication. On previous Xrays the S1-S2 foramina appear accessible; if no better can proceed with bilateral S1 TFESI.     She will let me know how she is doing in 1 week.     Sher Loo DO  Physical Medicine and Rehabilitation  [facility]

## 2024-03-04 RX ORDER — TOPIRAMATE 25 MG/1
25 TABLET ORAL DAILY
Qty: 90 TABLET | Refills: 0 | Status: SHIPPED | OUTPATIENT
Start: 2024-03-04

## 2024-03-07 ENCOUNTER — OFFICE VISIT (OUTPATIENT)
Dept: SURGERY | Facility: CLINIC | Age: 79
End: 2024-03-07
Payer: MEDICARE

## 2024-03-07 VITALS
WEIGHT: 194.38 LBS | SYSTOLIC BLOOD PRESSURE: 126 MMHG | OXYGEN SATURATION: 96 % | HEART RATE: 82 BPM | HEIGHT: 66 IN | DIASTOLIC BLOOD PRESSURE: 62 MMHG | BODY MASS INDEX: 31.24 KG/M2

## 2024-03-07 DIAGNOSIS — F43.9 STRESS: ICD-10-CM

## 2024-03-07 DIAGNOSIS — I10 ESSENTIAL HYPERTENSION: Primary | ICD-10-CM

## 2024-03-07 DIAGNOSIS — Z51.81 ENCOUNTER FOR THERAPEUTIC DRUG MONITORING: ICD-10-CM

## 2024-03-07 DIAGNOSIS — E66.9 OBESITY (BMI 30-39.9): ICD-10-CM

## 2024-03-07 PROCEDURE — 99214 OFFICE O/P EST MOD 30 MIN: CPT | Performed by: INTERNAL MEDICINE

## 2024-03-07 RX ORDER — ESCITALOPRAM OXALATE 10 MG/1
10 TABLET ORAL DAILY
Qty: 90 TABLET | Refills: 1 | Status: SHIPPED | OUTPATIENT
Start: 2024-03-07 | End: 2024-06-05

## 2024-03-07 RX ORDER — PHENTERMINE HYDROCHLORIDE 30 MG/1
30 CAPSULE ORAL EVERY MORNING
Qty: 30 CAPSULE | Refills: 5 | Status: SHIPPED | OUTPATIENT
Start: 2024-03-07

## 2024-03-07 NOTE — PROGRESS NOTES
[facility] [place], [facility], [place]  [address]  [facility] [address]  Dept: [phone]       Patient:  Anna Dalton  :      1945  MRN:      [id number]    Chief Complaint:    Chief Complaint   Patient presents with    Follow - Up    Weight Management     Weight check        SUBJECTIVE     History of Present Illness:  Sera is being seen today for a follow-up for non surgical weight loss.     Past Medical History:   Past Medical History:   Diagnosis Date    Arthritis     left shoulder     Back problem     Cervical disc disease     Colon polyp     Esophageal reflux     High blood pressure     High cholesterol     HTN (hypertension) 2015    Obesity (BMI 30-39.9)     Osteoarthritis     Other and unspecified hyperlipidemia     Unspecified essential hypertension     Uterine fibroid         Comorbidities:  Back pain-Improvement?  yes, Joint pain-Improvement?  yes, AURELIO-Improvement?  yes and Snoring-Improvement?  yes    OBJECTIVE     Vitals: /62   Pulse 82   Ht 5' 6\" (1.676 m)   Wt 194 lb 6.4 oz (88.2 kg)   SpO2 96%   BMI 31.38 kg/m²     Initial weight loss: -01   Total weight loss: -00   Start weight: 195    Wt Readings from Last 3 Encounters:   24 194 lb 6.4 oz (88.2 kg)   24 190 lb (86.2 kg)   24 190 lb (86.2 kg)       Patient Medications:    Current Outpatient Medications   Medication Sig Dispense Refill    escitalopram 10 MG Oral Tab Take 1 tablet (10 mg total) by mouth daily. 90 tablet 1    Phentermine HCl 30 MG Oral Cap Take 1 capsule (30 mg total) by mouth every morning. 30 capsule 5    topiramate 25 MG Oral Tab Take 1 tablet (25 mg total) by mouth daily. 90 tablet 0    DULoxetine 20 MG Oral Cap DR Particles Take 1 capsule (20 mg total) by mouth daily. 30 capsule 0    losartan 50 MG Oral Tab Take 1 tablet (50 mg total) by mouth daily. 90 tablet 3    fluticasone propionate 50 MCG/ACT Nasal Suspension SPRAY  TWICE IN EACH NOSTRIL DAILY 3 each 3    hydroCHLOROthiazide 12.5 MG Oral Tab Take 1 tablet (12.5 mg total) by mouth daily. 90 tablet 1    amLODIPine 5 MG Oral Tab Take 1 tablet (5 mg total) by mouth daily. 90 tablet 3    gabapentin 300 MG Oral Cap Take 2 capsules  by mouth in the morning, at noon, and at bedtime. 540 capsule 2    lansoprazole 30 MG Oral Capsule Delayed Release TAKE ONE CAPSULE BY MOUTH EVERY MORNING BEFORE BREAKFAST 90 capsule 3    clotrimazole-betamethasone 1-0.05 % External Cream Apply 1 Application topically 2 (two) times daily as needed. 45 g 3    levocetirizine 5 MG Oral Tab TAKE ONE TABLET BY MOUTH EVERY DAY AT BEDTIME 90 tablet 3    Norethindrone (SHAROBEL) 0.35 MG Oral Tab Take 1 tablet (0.35 mg total) by mouth daily. 84 tablet 3    METHOCARBAMOL 750 MG Oral Tab Take 1 tablet  by mouth Daily as needed (muscle tightness/spasms). 90 tablet 0    NAPROXEN 500 MG Oral Tab TAKE ONE TABLET BY MOUTH TWICE DAILY AS NEEDED 60 tablet 0    clindamycin 1 % External Lotion Apply 1 Application. topically 2 (two) times daily. 60 mL 1    diazePAM 2 MG Oral Tab 1 tab PO 1 hour before MRI; 1 tab PO 30 minutes before MRI PRN claustrophobia. Must have . 2 tablet 0    diclofenac 1 % External Gel Apply 4 g topically 4 (four) times daily. 1 each 0    Acetaminophen  MG Oral Tab CR Take 1 tablet (650 mg total) by mouth every 8 (eight) hours as needed for Pain.      acyclovir 5 % External Ointment Apply 1 g topically as needed. 30 g 0    ASPIRIN EC LOW DOSE 81 MG Oral Tab EC TAKE 1 TABLET BY MOUTH DAILY. 30 tablet 3    simvastatin 20 MG Oral Tab Take 1 tablet (20 mg total) by mouth nightly.      hydrocortisone 1 % External Cream Apply 1 Application topically 2 (two) times daily. 30 g 0    Probiotic Product (ACIDOPHILUS PROBIOTIC BLEND OR) Take  by mouth 2 (two) times daily.      Multiple Vitamins-Minerals (MULTI-VITAMIN/MINERALS) Oral Tab Take 1 tablet by mouth daily.       Allergies:  Patient has no known  allergies.     Social History:    Social History     Socioeconomic History    Marital status:      Spouse name: Not on file    Number of children: 5    Years of education: Not on file    Highest education level: Not on file   Occupational History    Occupation:      Comment: retired   Tobacco Use    Smoking status: Every Day     Packs/day: 0.50     Years: 15.00     Additional pack years: 0.00     Total pack years: 7.50     Types: Cigarettes     Start date: 2017    Smokeless tobacco: Never    Tobacco comments:     currently every day smoker, 5-6 cigs/day   Vaping Use    Vaping Use: Never used   Substance and Sexual Activity    Alcohol use: Yes     Comment: On occasion    Drug use: No    Sexual activity: Not on file   Other Topics Concern     Service Not Asked    Blood Transfusions Not Asked    Caffeine Concern Yes     Comment: 2 cups tea/day    Occupational Exposure Not Asked    Hobby Hazards Not Asked    Sleep Concern Not Asked    Stress Concern Not Asked    Weight Concern Not Asked    Special Diet Not Asked    Back Care Not Asked    Exercise Yes     Comment: chair exercise class 1x/week, walks stairs    Bike Helmet Not Asked    Seat Belt Not Asked    Self-Exams Not Asked    Grew up on a farm Not Asked    History of tanning Not Asked    Outdoor occupation Not Asked    Breast feeding Not Asked    Reaction to local anesthetic No   Social History Narrative    The patient does not use an assistive device..      The patient does live in a home with stairs.     Social Determinants of Health     Financial Resource Strain: Not on file   Food Insecurity: Not on file   Transportation Needs: Not on file   Physical Activity: Not on file   Stress: Not on file   Social Connections: Not on file   Housing Stability: Not on file     Surgical History:    Past Surgical History:   Procedure Laterality Date      1972    COLONOSCOPY  2015    COLONOSCOPY      HYSTERECTOMY      IR EPIDURAL STERIOD  INJECTION      LUMBAR SPINE FUSION COMBINED      SPINE SURGERY PROCEDURE UNLISTED  5/21/2014    Lumbar microdiscectomy    TOTAL ABDOMINAL HYSTERECTOMY  1992    w/ BSO    TUBAL LIGATION       Family History:    Family History   Problem Relation Age of Onset    Diabetes Father     Hypertension Mother     Lipids Mother     Heart Disorder Mother     Dementia Mother     Breast Cancer Sister         in her 30's.    Cancer Other         Uncles with lung cancer    Hypertension Sister        Food Journal  Reviewed and Discussed:       Patient has a Food Journal?: yes   Patient is reading nutrition labels?  yes  Average Caloric Intake:     Average CHO Intake: 130  Is patient exercising? yes  Type of exercise? ADL's  Limited due to back pain    Eating Habits  Patient states the following:  Eats 2 meal(s) per day  Length of time it takes to consume a meal:  20  # of snacks per day: 1 Type of snacks:  fruit  Amount of soda consumption per day:    Amount of water (in ounces) per day:  64  Drinking between meals only:  yes  Toughest challenge:  exercise    Nutritional Goals  Limit carbohydrates to 100 gms per day, Eat 100-200 calories within 1 hour of waking  and Eat 3-4 cups of fresh fruits or vegetables daily    Behavior Modifications Reviewed and Discussed  Eat breakfast, Eat 3 meals per day, Plan meals in advance, Read nutrition labels, Drink 64 oz of water per day, Maintain a daily food journal, No drinking 30 minutes before or after meals, Utlize portion control strategies to reduce calorie intake, Identify triggers for eating and manage cues and Eat slowly and take 20 to 30 minutes to complete each meal    Exercise Goals Reviewed and Discussed    Continue to ambulate    ROS:    Constitutional: positive for fatigue  Respiratory: negative  Cardiovascular: negative  Gastrointestinal: negative  Musculoskeletal:positive for arthralgias and back pain  Neurological: negative  Behavioral/Psych: negative  Endocrine: negative  All  other systems were reviewed and are negative    Physical Exam:   General appearance: alert, cooperative and mildly obese  Head: Normocephalic, without obvious abnormality, atraumatic  Eyes: conjunctivae/corneas clear. PERRL, EOM's intact. Fundi benign.  Lungs: clear to auscultation bilaterally  Heart: S1, S2 normal, no murmur, click, rub or gallop, regular rate and rhythm  Abdomen:  soft, obese, non tender  Extremities: extremities normal, atraumatic, no cyanosis or edema  Pulses: 2+ and symmetric    ASSESSMENT     HYPERTENSION:  The patient's blood pressure has been well controlled.  she has been checking it as instructed and has remained in relatively good control.      Encounter Diagnosis(ses):   Encounter Diagnoses   Name Primary?    Essential hypertension Yes    Stress     Encounter for therapeutic drug monitoring     Obesity (BMI 30-39.9)        PLAN     Patient is not interested in bariatric surgery. Patient desires to pursue traditional weight loss at this time.      HYPERTENSION: Blood pressure stable on the above medications. No interval change in antihypertensive medication.     DEGENERATIVE JOINT DISEASE: Of the knees is stable. Encourage upper body exercises if unable to perform weight-bearing exercises.      Goals for next month:  1. Keep a food log.  2. Drink 48-64 ounces of non-caloric beverages per day. No fruit juices or regular soda.  3. Increase activity-upper body exercises, walk 10 minutes per day.  4. Increase fruit and vegetable servings to 5-6 per day.      Tolerating topiramate. Elevated leptin noted.  Will refill    PHENTERMINE:  Tolerating well  EKG done    Follow up with PCP as scheduled     Lexapro for stress: refill at current dose  On Duloxetine per PMNR    SLOW BURNER    Diagnoses and all orders for this visit:    Essential hypertension    Stress  -     escitalopram 10 MG Oral Tab; Take 1 tablet (10 mg total) by mouth daily.    Encounter for therapeutic drug monitoring    Obesity  (BMI 30-39.9)  -     Phentermine HCl 30 MG Oral Cap; Take 1 capsule (30 mg total) by mouth every morning.          Jose Elias Templeton MD

## 2024-03-11 ENCOUNTER — TELEPHONE (OUTPATIENT)
Dept: PHYSICAL MEDICINE AND REHAB | Facility: CLINIC | Age: 79
End: 2024-03-11

## 2024-03-12 NOTE — TELEPHONE ENCOUNTER
Spoke with patient and she did not have any side effects from the medication. Pt stated tho it was not relieving the pain.     Educated patient that it might take several weeks for the full effect of Duloxetine. Educated that this medication should not be stopped abruptly.     Pt  was taking Naproxen 400 BID, now taking only 200 BID, and wanted to ask Dr Loo on her recommendations.

## 2024-03-13 NOTE — TELEPHONE ENCOUNTER
Spoke with patient and advise on Dr Loo's recommendations. No further actions needed at this time. Closing the encounter.

## 2024-03-18 DIAGNOSIS — E66.9 OBESITY (BMI 30-39.9): ICD-10-CM

## 2024-03-18 RX ORDER — ACETAMINOPHEN AND CODEINE PHOSPHATE 120; 12 MG/5ML; MG/5ML
0.35 SOLUTION ORAL DAILY
Qty: 84 TABLET | Refills: 0 | Status: SHIPPED | OUTPATIENT
Start: 2024-03-18

## 2024-03-18 RX ORDER — TOPIRAMATE 25 MG/1
25 TABLET ORAL DAILY
Qty: 90 TABLET | Refills: 0 | OUTPATIENT
Start: 2024-03-18

## 2024-03-18 NOTE — TELEPHONE ENCOUNTER
Failed Protocol/No Protocol. No recent PAP on file.     Requested Prescriptions   Pending Prescriptions Disp Refills    NORETHINDRONE 0.35 MG Oral Tab [Pharmacy Med Name: Norethindrone 0.35 Mg Tab Alyssia] 84 tablet 0     Sig: TAKE ONE TABLET BY MOUTH ONE TIME DAILY       Gynecology Medication Protocol Failed - 3/15/2024  9:30 PM        Failed - PASS--PENDING LAST PAP WNL--VIA MANUAL LOOKUP        Passed - Mammogram in past 12 months        Passed - Physical or Pelvic/Breast in past 12 or next 3 mos--VIA MANUAL LOOKUP             Future Appointments         Provider Department Appt Notes    In 5 months Jose Elias Templeton MD [facility]           Recent Outpatient Visits              1 week ago Essential hypertension    [facility] Jose Elias Templeton MD    Office Visit    2 weeks ago Spinal stenosis of lumbar region with neurogenic claudication    [facility] Sher Loo DO    Office Visit    3 weeks ago Neurogenic claudication due to lumbar spinal stenosis    [facility] Giovanny Noguera PA-C    Office Visit    3 months ago     [facility] Holland Castano, PT    Office Visit    3 months ago     [facility] Holland Castano, SKYE    Office Visit

## 2024-03-22 RX ORDER — ACETAMINOPHEN AND CODEINE PHOSPHATE 120; 12 MG/5ML; MG/5ML
0.35 SOLUTION ORAL DAILY
Qty: 84 TABLET | Refills: 0 | OUTPATIENT
Start: 2024-03-22

## 2024-03-22 NOTE — TELEPHONE ENCOUNTER
Duplicate request, previously addressed.    Medication Quantity Refills Start End   Norethindrone 0.35 MG Oral Tab 84 tablet 0 3/18/2024 --   Sig:   Take 1 tablet (0.35 mg total) by mouth daily.     Route:   Oral     Pharmacy  [id number] - [address] [phone], [phone]

## 2024-03-25 DIAGNOSIS — M48.062 SPINAL STENOSIS OF LUMBAR REGION WITH NEUROGENIC CLAUDICATION: ICD-10-CM

## 2024-03-25 RX ORDER — DULOXETIN HYDROCHLORIDE 20 MG/1
20 CAPSULE, DELAYED RELEASE ORAL DAILY
Qty: 30 CAPSULE | Refills: 0 | Status: SHIPPED | OUTPATIENT
Start: 2024-03-25

## 2024-04-12 DIAGNOSIS — E66.9 OBESITY (BMI 30-39.9): ICD-10-CM

## 2024-04-15 RX ORDER — TOPIRAMATE 25 MG/1
25 TABLET ORAL DAILY
Qty: 90 TABLET | Refills: 0 | Status: SHIPPED | OUTPATIENT
Start: 2024-04-15

## 2024-04-18 ENCOUNTER — OFFICE VISIT (OUTPATIENT)
Dept: PHYSICAL MEDICINE AND REHAB | Facility: CLINIC | Age: 79
End: 2024-04-18
Payer: MEDICARE

## 2024-04-18 ENCOUNTER — TELEPHONE (OUTPATIENT)
Dept: PHYSICAL MEDICINE AND REHAB | Facility: CLINIC | Age: 79
End: 2024-04-18

## 2024-04-18 VITALS — WEIGHT: 190 LBS | BODY MASS INDEX: 31 KG/M2 | HEART RATE: 78 BPM | OXYGEN SATURATION: 99 %

## 2024-04-18 DIAGNOSIS — M48.062 SPINAL STENOSIS OF LUMBAR REGION WITH NEUROGENIC CLAUDICATION: Primary | ICD-10-CM

## 2024-04-18 DIAGNOSIS — B00.9 HERPES SIMPLEX: ICD-10-CM

## 2024-04-18 PROCEDURE — 99214 OFFICE O/P EST MOD 30 MIN: CPT | Performed by: PHYSICAL MEDICINE & REHABILITATION

## 2024-04-18 RX ORDER — ACYCLOVIR 50 MG/G
1 OINTMENT TOPICAL AS NEEDED
Qty: 30 G | Refills: 0 | Status: SHIPPED | OUTPATIENT
Start: 2024-04-18

## 2024-04-18 NOTE — TELEPHONE ENCOUNTER
Per CMS Guidelines -no authorization is required for Caudal epidural steroid injection under fluoroscopy CPT 76773    Status: Authorization is not required based on medical necessity however is not a guarantee of payment and may be subject to review once claim is submitted-Covered Benefit     Fyi, per CMS LCD limitations-Use of Moderate or Deep Sedation, General Anesthesia, or Monitored Anesthesia Care (MAC) is usually unnecessary or rarely indicated for these procedures and therefore, is not considered medically reasonable and necessary. Even in patients with a needle phobia and anxiety, typically oral anxiolytics suffice. In exceptional and unique cases, documentation must clearly establish the need for such sedation in the specific patient.    Per Dr. Loo w/ IVCS

## 2024-04-18 NOTE — PATIENT INSTRUCTIONS
We will call you to schedule injection once we have insurance approval. Plan to hold the phentermine starting Monday 4/22.

## 2024-04-19 NOTE — TELEPHONE ENCOUNTER
Patient has been scheduled for caudal steroid epidural injection on 4/29/24 at the [facility] with Dr. Loo.   -Anesthesia type:  IVCS  -Patient was advised that if he/she does receive the covid vaccine it needs to be at least 2 weeks before or after the injection.  -Medications and allergies reviewed.  -Patient reminded to hold NSAIDs (Ibuprofen, ASA 81, Aleve, Naproxen, Mobic, Diclofenac, Etodolac, Celebrex etc.) for 3 days prior to Lumbar MBB/Facet if BMI is greater than 35. For Cervical injections only hold multivitamins, Vitamin E, Fish Oil, Phentermine/Lomaira for 7 days prior to injection and NSAIDS.   mg to be held for 7 days prior to injections.  -If patient is receiving MAC/IVCS, weight loss oral/injectable medications will need to be held for 7 days prior to injection.  -Patient informed to fast 12 hours prior to procedure with IVCS/MAC.   -If on blood thinner, clearance has been received and approved to hold this medication by provider.   -Patient informed of [facility]'s  policy:  he/she will need a  to and from procedure and must be on site for their entirety of their visit, if their ride is unable to the procedure will be cancelled.   -[facility] is located in the [facility] 1st floor,  may park in the yellow/purple parking lot.  Patient verbalized understanding and agrees with plan.  Scheduled in Epic: Yes  Scheduled in Surgical Case: Yes  Follow up appointment made: NOV: Visit date not found

## 2024-04-19 NOTE — PROGRESS NOTES
Progress note    C/C:   Chief Complaint   Patient presents with    Follow - Up     03/01/24 LOV. Taking duloxetine 20mg and aleve daily. Feels it helps sometimes. Pain 4/10. Numbness in L foot. Gabapentin daily.       HPI: 78-year-old female presents for follow-up.  Since last time I saw her she has had mild improvement with lower back and left lower leg pain that is worsening with standing and walking, associated numbness and tingling in the left foot.  No side effects from the duloxetine.  Has elected to not seek further neurosurgical consultation through the [facility].     Pertinent allergies: No Known Allergies     Physical exam:  Pulse 78   Wt 190 lb (86.2 kg)   SpO2 99%   BMI 30.67 kg/m²      Lumbar spine exam:    No skin rash lumbar/upper sacral region  No pain with lumbar flexion. + pain with lumbar extension.  5/5 LE strength b/l  Decreased sensation to light touch left lateral lower leg  2/4 quad, gastrocs reflexes b/l  SLR positive left    Imaging: No new imaging to review    Assessment and plan  Lumbar stenosis  Left lumbar radiculitis  Status post lumbar fusion    We discussed nonoperative treatment options, which include increasing the dose of duloxetine, or caudal epidural steroid injection under fluoroscopy.  She elects to continue the same dose of duloxetine and to proceed with caudal epidural steroid injection under fluoroscopy, with IV conscious sedation for situational anxiety. We discussed the risks of procedure, including bleeding, infection, nerve injury, HA; elects to proceed.    She should hold the phentermine 1 week prior to procedure.    Sher Loo DO  Physical Medicine and Rehabilitation  [facility]

## 2024-04-20 DIAGNOSIS — M48.062 SPINAL STENOSIS OF LUMBAR REGION WITH NEUROGENIC CLAUDICATION: ICD-10-CM

## 2024-04-22 RX ORDER — DULOXETIN HYDROCHLORIDE 20 MG/1
20 CAPSULE, DELAYED RELEASE ORAL DAILY
Qty: 30 CAPSULE | Refills: 0 | Status: SHIPPED | OUTPATIENT
Start: 2024-04-22

## 2024-04-22 NOTE — TELEPHONE ENCOUNTER
Refill Request    Medication request: DULOXETINE 20 MG Oral Cap     TAKE ONE CAPSULE BY MOUTH ONE TIME DAILY     LOV:4/18/2024 Sher Loo DO   Due back to clinic per last office note:  After in jection  NOV: Visit date not found      [place]: [place]25/24 [place]30    Urine drug screen (if applicable): N/A  Pain contract: N/A    LOV plan (if weaning or changing medications): No changes were mentioned

## 2024-04-24 RX ORDER — LEVOCETIRIZINE DIHYDROCHLORIDE 5 MG/1
TABLET, FILM COATED ORAL
Qty: 90 TABLET | Refills: 3 | Status: SHIPPED | OUTPATIENT
Start: 2024-04-24

## 2024-04-24 NOTE — TELEPHONE ENCOUNTER
Refill passed per [facility] protocol.  Requested Prescriptions   Pending Prescriptions Disp Refills    LEVOCETIRIZINE 5 MG Oral Tab [Pharmacy Med Name: Levocetirizine Dihydrochloride 5 Mg Tab Teva] 90 tablet 0     Sig: TAKE ONE TABLET BY MOUTH EVERY DAY AT BEDTIME       Allergy Medication Protocol Passed - 4/23/2024  9:24 AM        Passed - In person appointment or virtual visit in the past 12 mos or appointment in next 3 mos     Recent Outpatient Visits              6 days ago Spinal stenosis of lumbar region with neurogenic claudication    [facility] Sher Loo DO    Office Visit    1 month ago Essential hypertension    [facility] Jose Elias Templeton MD    Office Visit    1 month ago Spinal stenosis of lumbar region with neurogenic claudication    [facility] Sher Loo DO    Office Visit    2 months ago Neurogenic claudication due to lumbar spinal stenosis    [facility] Giovanny Noguera PA-C    Office Visit    4 months ago     [facility] Holland Castano PT    Office Visit          Future Appointments         Provider Department Appt Notes    In 5 days Sher Loo DO [facility] IVCS- Caudal steroid injection    In 4 months Jose Elias Templeton MD [facility]

## 2024-04-25 ENCOUNTER — TELEPHONE (OUTPATIENT)
Dept: PHYSICAL MEDICINE AND REHAB | Facility: CLINIC | Age: 79
End: 2024-04-25

## 2024-04-29 ENCOUNTER — TELEPHONE (OUTPATIENT)
Dept: PHYSICAL MEDICINE AND REHAB | Facility: CLINIC | Age: 79
End: 2024-04-29

## 2024-04-29 NOTE — TELEPHONE ENCOUNTER
Attempted to start PA via covermymeds. Unable to locate or initiate via covermymeds.     In office staff will need to review information on fax received in order to start PA.

## 2024-05-01 NOTE — TELEPHONE ENCOUNTER
Rec'v'd Medicare Part D Formulary Exception fax form for Acyclovir 5% Ointment. Placed in RN bin.

## 2024-05-02 RX ORDER — HYDROCHLOROTHIAZIDE 12.5 MG/1
12.5 TABLET ORAL DAILY
Qty: 90 TABLET | Refills: 3 | Status: SHIPPED | OUTPATIENT
Start: 2024-05-02

## 2024-05-02 NOTE — TELEPHONE ENCOUNTER
Rcvd 2nd fax from [facility] re: PA for ,    Acyclovir 5 % External Ointment ( [employer])    Placed in RN folder

## 2024-05-02 NOTE — TELEPHONE ENCOUNTER
Please review; protocol failed/No Protocol    Requested Prescriptions   Pending Prescriptions Disp Refills    HYDROCHLOROTHIAZIDE 12.5 MG Oral Tab [Pharmacy Med Name: Hydrochlorothiazide 12.5 Mg Tab Acta] 90 tablet 0     Sig: TAKE ONE TABLET BY MOUTH ONE TIME DAILY       Hypertension Medications Protocol Failed - 5/1/2024  9:25 AM        Failed - Last BP reading less than 140/90     BP Readings from Last 1 Encounters:   04/29/24 150/74               Passed - CMP or BMP in past 12 months        Passed - In person appointment or virtual visit in the past 12 mos or appointment in next 3 mos     Recent Outpatient Visits              2 weeks ago Spinal stenosis of lumbar region with neurogenic claudication    [facility] Sher [facility]oo DO    Office Visit    1 month ago Essential hypertension    [facility] Jose Elias Templeton MD    Office Visit    2 months ago Spinal stenosis of lumbar region with neurogenic claudication    [facility] Sher Loo DO    Office Visit    2 months ago Neurogenic claudication due to lumbar spinal stenosis    [facility] Giovanny Noguera PA-C    Office Visit    5 months ago     [facility] Holland Castano PT    Office Visit          Future Appointments         Provider Department Appt Notes    In 4 months Jose Elias Templeton MD [facility]                     Passed - EGFRCR or GFRAA > 50     GFR Evaluation  EGFRCR: 58 , resulted on 2/21/2024             Future Appointments         Provider Department Appt Notes    In 4 months Jose Elias Templeton MD [facility]           Recent Outpatient Visits              2 weeks ago Spinal stenosis of lumbar region with neurogenic claudication    [facility]  Sher Herrera DO    Office Visit    1 month ago Essential hypertension    [facility] [place]    Office Visit    2 months ago Spinal stenosis of lumbar region with neurogenic claudication    [facility], Sher Herrera DO    Office Visit    2 months ago Neurogenic claudication due to lumbar spinal stenosis    [facility] [place] Giovanny Noguera PA-C    Office Visit    5 months ago     [facility] Holland Castano, PT    Office Visit

## 2024-05-09 ENCOUNTER — MED REC SCAN ONLY (OUTPATIENT)
Dept: PHYSICAL MEDICINE AND REHAB | Facility: CLINIC | Age: 79
End: 2024-05-09

## 2024-05-09 NOTE — TELEPHONE ENCOUNTER
Completed PA for Acyclovir. Faxed to requested number at #[phone]. PA letter sent to scanning at this time.

## 2024-05-13 ENCOUNTER — TELEPHONE (OUTPATIENT)
Dept: PHYSICAL MEDICINE AND REHAB | Facility: CLINIC | Age: 79
End: 2024-05-13

## 2024-05-13 NOTE — TELEPHONE ENCOUNTER
Denied: This request for coverage was based on our topical Acylclovir external ointment 5% a prior authorization criteria.  It cannot be approved at this time. You must meet our criteria for this drug to be covered by the plan.     Next steps:   Appeal P: [phone]    Placed into scanning.

## 2024-05-18 DIAGNOSIS — M48.062 SPINAL STENOSIS OF LUMBAR REGION WITH NEUROGENIC CLAUDICATION: ICD-10-CM

## 2024-05-20 NOTE — TELEPHONE ENCOUNTER
Patient stated she takes both medications with no side effects.     Patient also wanted to let Dr Loo know that she received only 50% improvement from Caudal Epidural Steroid Injection.     Explained to the patient that taking Cymbalta and Lexapro together might cause interaction. Patient aware. Routing back to Dr Loo.

## 2024-05-20 NOTE — TELEPHONE ENCOUNTER
Please clarify if patient is taking escitalopram; from what I can recall she is not taking that medication. If she is not taking escitalopram ok to sign order.

## 2024-05-20 NOTE — TELEPHONE ENCOUNTER
Refill Request    Medication request: DULOXETINE 20 MG Oral Cap DR Particles  TAKE ONE CAPSULE BY MOUTH ONE TIME DAILY     LOV:4/18/2024 Sher Loo,    Due back to clinic per last office note:  \"Return for injection. \"  NOV: Visit date not found      [place]: 4/22/2024 #30    Urine drug screen (if applicable): N/A  Pain contract: N/A    LOV plan (if weaning or changing medications): Per Dr. Loo's LOV notes: \"We discussed nonoperative treatment options, which include increasing the dose of duloxetine, or caudal epidural steroid injection under fluoroscopy. She elects to continue the same dose of duloxetine and to proceed with caudal epidural steroid injection under fluoroscopy, with IV conscious sedation for situational anxiety. \"    Written order, ok to fill: Routed to Dr. Loo due to pop up warnings-for his review & approval.

## 2024-05-22 RX ORDER — DULOXETIN HYDROCHLORIDE 20 MG/1
20 CAPSULE, DELAYED RELEASE ORAL DAILY
Qty: 30 CAPSULE | Refills: 0 | Status: SHIPPED | OUTPATIENT
Start: 2024-05-22

## 2024-05-22 NOTE — TELEPHONE ENCOUNTER
Recommend she stop the lexapro if going to continue duloxetine. Duloxetine ordered; if she elects to continue the lexapro she should not fill the medication.

## 2024-06-06 RX ORDER — CLINDAMYCIN PHOSPHATE 10 UG/ML
1 LOTION TOPICAL 2 TIMES DAILY
Qty: 60 ML | Refills: 0 | Status: SHIPPED | OUTPATIENT
Start: 2024-06-06

## 2024-06-06 NOTE — TELEPHONE ENCOUNTER
Please review. Protocol Failed; No Protocol    Last written: 12/20/2022    Requested Prescriptions   Pending Prescriptions Disp Refills    CLINDAMYCIN 1 % External Lotion [Pharmacy Med Name: Clindamycin Phosphate 1 % Lot Nort] 60 mL 0     Sig: Apply 1 Application. topically 2 (two) times daily.       There is no refill protocol information for this order            Future Appointments         Provider Department Appt Notes    In 3 months [facility]     In 3 months Estuardo Lopes DO [facility]          Recent Outpatient Visits              1 month ago Spinal stenosis of lumbar region with neurogenic claudication    [facility]    Office Visit    3 months ago Essential hypertension    [facility] Jose Elias Templeton MD    Office Visit    3 months ago Spinal stenosis of lumbar region with neurogenic claudication    [facility]    Office Visit    3 months ago Neurogenic claudication due to lumbar spinal stenosis    [facility] Giovanny Noguera PA-C    Office Visit    6 months ago     [facility] Holland Castano, PT    Office Visit

## 2024-06-07 RX ORDER — ACETAMINOPHEN AND CODEINE PHOSPHATE 120; 12 MG/5ML; MG/5ML
0.35 SOLUTION ORAL DAILY
Qty: 84 TABLET | Refills: 3 | Status: SHIPPED | OUTPATIENT
Start: 2024-06-07

## 2024-06-09 DIAGNOSIS — M48.062 SPINAL STENOSIS OF LUMBAR REGION WITH NEUROGENIC CLAUDICATION: ICD-10-CM

## 2024-06-10 RX ORDER — GABAPENTIN 300 MG/1
CAPSULE ORAL
Qty: 540 CAPSULE | Refills: 0 | Status: SHIPPED | OUTPATIENT
Start: 2024-06-10

## 2024-06-10 NOTE — TELEPHONE ENCOUNTER
Refill Request    LAST GFR: 02/21/2024 - 58. PROTOCOL FAILED. Forwarded refill request to Dr. Loo at this time.    Medication request: gabapentin 300 MG Oral Cap. Take 2 capsules  by mouth in the morning, at noon, and at bedtime.     LOV: 4/18/2024 ( & 04/29/2024 at [facility]) Sher Loo,    Due back to clinic per last office note: f/u post injection protocol (?)  NOV: Visit date not found      [place]: 03/14/2024 [place]540 [place]    Urine drug screen (if applicable): n/a  Pain contract: n/a    LOV plan (if weaning or changing medications): Gabapentin NOT mentioned in LOV note. Only mentioned increasing the dose of duloxetine

## 2024-06-19 DIAGNOSIS — M48.062 SPINAL STENOSIS OF LUMBAR REGION WITH NEUROGENIC CLAUDICATION: ICD-10-CM

## 2024-06-19 RX ORDER — DULOXETIN HYDROCHLORIDE 20 MG/1
20 CAPSULE, DELAYED RELEASE ORAL DAILY
Qty: 30 CAPSULE | Refills: 0 | OUTPATIENT
Start: 2024-06-19

## 2024-06-19 NOTE — TELEPHONE ENCOUNTER
Refill Request    Medication request: DULOXETINE 20 MG Oral Cap DR Particles.  TAKE ONE CAPSULE BY MOUTH ONE TIME DAILY      LOV:4/18/2024 Sher Loo,    Due back to clinic per last office note:  post injection follow up per protocol  NOV: Visit date not found      [place]: 5/23/2024 #30    Urine drug screen (if applicable): N/A  Pain contract: N/A    LOV plan (if weaning or changing medications): She elects to continue the same dose of duloxetine and to proceed with caudal epidural steroid injection under fluoroscopy, with IV conscious sedation for situational anxiety.     Patient on Escitalopram 10 mg as well last dispensed on 6/12/2024 for a 90 day supply.    Per Dr Loo on 5/18/2024 refill request \"  Recommend she stop the lexapro if going to continue duloxetine. Duloxetine ordered; if she elects to continue the lexapro she should not fill the medication. \"          Patient was informed on Dr Loo's recommendations on last office visit.     Spoke with patient on the phone and she confirmed she is still taking the Lexapro. Informed patient that the refill on Duloxetine will be denied as she is not able to take this medications together.     Patient verbalized understanding. Also scheduled patient for a post injection follow up appointment  on 7/11/2024 with Dr Loo.     No further actions needed. Closing this encounter.

## 2024-06-19 NOTE — TELEPHONE ENCOUNTER
Patient prefers to keep taking Lexapro, and is wondering if Dr. Loo would be able to prescribe another medication that may help her with her pain but that she is still able to take her Lexapro.    Routed to Dr. Loo for recommendations on another medication which patient may safely take along with Lexapro.

## 2024-06-20 RX ORDER — TRAMADOL HYDROCHLORIDE 50 MG/1
50 TABLET ORAL 2 TIMES DAILY PRN
Qty: 14 TABLET | Refills: 0 | Status: SHIPPED | OUTPATIENT
Start: 2024-06-20

## 2024-06-20 NOTE — TELEPHONE ENCOUNTER
Spoke with patient and informed on Dr Loo's recommendations. Patient wants to try the Tramadol.     Pending order for Tramadol and routing to Dr Loo to review.

## 2024-06-20 NOTE — TELEPHONE ENCOUNTER
Unfortunately there are few medications we can trial that have either already not been tried or that may potentially interact one of her current medications. Only other options are a low dose tramadol as needed, and OTC tylenol.

## 2024-07-07 DIAGNOSIS — E66.9 OBESITY (BMI 30-39.9): ICD-10-CM

## 2024-07-08 RX ORDER — TOPIRAMATE 25 MG/1
25 TABLET ORAL DAILY
Qty: 90 TABLET | Refills: 0 | Status: SHIPPED | OUTPATIENT
Start: 2024-07-08

## 2024-07-11 ENCOUNTER — OFFICE VISIT (OUTPATIENT)
Dept: PHYSICAL MEDICINE AND REHAB | Facility: CLINIC | Age: 79
End: 2024-07-11
Payer: MEDICARE

## 2024-07-11 ENCOUNTER — TELEPHONE (OUTPATIENT)
Dept: PHYSICAL MEDICINE AND REHAB | Facility: CLINIC | Age: 79
End: 2024-07-11

## 2024-07-11 VITALS
HEIGHT: 66 IN | WEIGHT: 190 LBS | RESPIRATION RATE: 18 BRPM | HEART RATE: 84 BPM | BODY MASS INDEX: 30.53 KG/M2 | OXYGEN SATURATION: 99 %

## 2024-07-11 DIAGNOSIS — M19.012 OSTEOARTHRITIS OF LEFT GLENOHUMERAL JOINT: Primary | ICD-10-CM

## 2024-07-11 DIAGNOSIS — M48.062 SPINAL STENOSIS OF LUMBAR REGION WITH NEUROGENIC CLAUDICATION: ICD-10-CM

## 2024-07-11 PROCEDURE — 20611 DRAIN/INJ JOINT/BURSA W/US: CPT | Performed by: PHYSICAL MEDICINE & REHABILITATION

## 2024-07-11 PROCEDURE — 99214 OFFICE O/P EST MOD 30 MIN: CPT | Performed by: PHYSICAL MEDICINE & REHABILITATION

## 2024-07-11 RX ORDER — TRIAMCINOLONE ACETONIDE 40 MG/ML
40 INJECTION, SUSPENSION INTRA-ARTICULAR; INTRAMUSCULAR ONCE
Status: COMPLETED | OUTPATIENT
Start: 2024-07-11 | End: 2024-07-11

## 2024-07-11 RX ORDER — TRAMADOL HYDROCHLORIDE 50 MG/1
50 TABLET ORAL 2 TIMES DAILY PRN
Qty: 60 TABLET | Refills: 0 | Status: SHIPPED | OUTPATIENT
Start: 2024-07-11

## 2024-07-11 RX ORDER — LIDOCAINE HYDROCHLORIDE 10 MG/ML
3 INJECTION, SOLUTION INFILTRATION; PERINEURAL ONCE
Status: COMPLETED | OUTPATIENT
Start: 2024-07-11 | End: 2024-07-11

## 2024-07-11 NOTE — TELEPHONE ENCOUNTER
Per CMS Guidelines -no authorization is required for Left glenohumeral joint steroid injection under US guidance CPT 77640,      Status: Authorization is not required based on medical necessity however is not a guarantee of payment and may be subject to review once claim is submitted-Covered Benefit   Injection done in office

## 2024-07-11 NOTE — PROGRESS NOTES
Progress note    C/C:   Chief Complaint   Patient presents with    Follow - Up     Pt is coming in to F/U on Spinal stenosis, Pt states that pain varies on the day, Admits to N/T on toes 1-5 in left foot, Pain 5/10      HPI: 79-year-old female presents for follow-up.  Underwent caudal epidural steroid injection under fluoroscopy on 4/29/2024 under IV conscious sedation for situational anxiety. Since the last time I saw her she went to two graduations in [place] and [facility]. She did reasonably well at both graduations; did not have to do a lot of standing and walking at both graduations. Walking within the areas was not overly painful. She can walk about from the parking lot to the [facility] without needing to stop. Resting helps relieve the pain significantly. There is numbness and tingling in the left foot that has been intermittent for years. Has been taking tramadol 50mg intermittently. She is also having increasing left shoulder pain with movement.     Pertinent allergies: No Known Allergies     Physical exam:  Pulse 84   Resp 18   Ht 66\"   Wt 190 lb (86.2 kg)   SpO2 99%   BMI 30.67 kg/m²      Lumbar spine exam:    No skin rash lumbar/upper sacral region  mild pain with lumbar flexion. + pain with lumbar extension.  5/5 LE strength b/l  SILT b/l LE  2/4 quad, gastrocs reflexes b/l  SLR Negative b/l    Decreased painful ROM of the left shoulder in external rotation, forward flexion    Imaging: No new imaging to review    Assessment and plan  Lumbar stenosis with neurogenic claudication  S/p lumbar fusion  Left glenohumeral osteoarthritis  HTN  GERD    Recommend aquatic therapy for the lower back, tramadol 50mg BID PRN severe pain. She is still considering seeing Dr. Martin at [facility] regarding further surgical options for low back pain s/p lumbar fusion. I also recommend left GH joint steroid injection under US guidance; done today, see separate note for details.     Sher Loo  DO  Physical Medicine and Rehabilitation  [facility]

## 2024-07-11 NOTE — PROCEDURES
Dx: primary osteoarthritis of left shoulder  Procedure: left glenohumeral joint steroid injection under US guidance    The patient is here for a left shoulder injection done under ultrasound guidance.  Under ultrasound guidance the left posterior glenohumeral joint was identified and a jazmine was placed on the patient's skin.The skin was cleaned with alcohol swabs x 3 and anesthetized with ethyl chloride spray.  Then a 25 gauge needle was inserted under ultrasound guidance into the left posterior glenohumeral joint.  Aspiration was performed and no blood, fluid, or air was aspirated.  The patient was then injected with 4 ml of 1 ml of 40 mg of Kenalog/ml and 3 ml of 1% lidocaine without epinephrine.  The needle was removed and a band aid was applied.     These images are permanently stored in the ultrasound unit or PACS system.

## 2024-07-23 ENCOUNTER — TELEPHONE (OUTPATIENT)
Dept: PHYSICAL MEDICINE AND REHAB | Facility: CLINIC | Age: 79
End: 2024-07-23

## 2024-07-23 NOTE — TELEPHONE ENCOUNTER
Patient calling to inform that on 7/11/24 after the left shoulder injection did not help at all, remains with the same level of pain she remains taking the Tramadol for the pain. Requesting a call from  or the nurses for some medical advise and or to inform how  wants to proceed going forward.

## 2024-08-07 ENCOUNTER — TELEPHONE (OUTPATIENT)
Dept: CASE MANAGEMENT | Age: 79
End: 2024-08-07

## 2024-08-07 DIAGNOSIS — Z12.31 VISIT FOR SCREENING MAMMOGRAM: Primary | ICD-10-CM

## 2024-08-08 NOTE — TELEPHONE ENCOUNTER
Condition update after Procedure.    - Patient had left glenohumeral joint steroid injection under US guidance  (specific procedure) on 7/11/24 (date) with .  - 0% relief. Same pain as before th injection.      - Current pain level:  0 (with laying down)  8 or 9 in the morning (worst)    - Pain location: bilateral shoulder pain (Left>Right).  - Pain description: aching, sharp/stabbing, and throbbing. Unable to lift her arm above her head.   - Current pain treatment: Tylenol for arthritis and Tramadol as needed (some help).    Voltaren, salonpas  and Aspercreme are helpful or getting a massage every 2-3 weeks.       Patient went yesterday to register for  aquatherpay      Patient is asking for next steps.

## 2024-08-20 DIAGNOSIS — K21.9 GASTROESOPHAGEAL REFLUX DISEASE: ICD-10-CM

## 2024-08-21 RX ORDER — LANSOPRAZOLE 30 MG/1
CAPSULE, DELAYED RELEASE ORAL
Qty: 90 CAPSULE | Refills: 3 | Status: SHIPPED | OUTPATIENT
Start: 2024-08-21 | End: 2024-11-21

## 2024-08-21 NOTE — TELEPHONE ENCOUNTER
Refill passed per [facility] protocol.     Requested Prescriptions   Pending Prescriptions Disp Refills    LANSOPRAZOLE 30 MG Oral Capsule Delayed Release [Pharmacy Med Name: Lansoprazole Dr 30 Mg Cap Life] 90 capsule 0     Sig: TAKE ONE CAPSULE BY MOUTH EVERY MORNING BEFORE BREAKFAST       Gastrointestional Medication Protocol Passed - 8/20/2024  3:01 AM        Passed - In person appointment or virtual visit in the past 12 mos or appointment in next 3 mos     Recent Outpatient Visits              1 month ago Osteoarthritis of left glenohumeral joint    [facility]    Office Visit    4 months ago Spinal stenosis of lumbar region with neurogenic claudication    [facility] Sher Loo DO    Office Visit    5 months ago Essential hypertension    [facility] Jose Elias Templeton MD    Office Visit    5 months ago Spinal stenosis of lumbar region with neurogenic claudication    [facility] Sher Loo DO    Office Visit    6 months ago Neurogenic claudication due to lumbar spinal stenosis    [facility] Giovanny Noguera PA-C    Office Visit          Future Appointments         Provider Department Appt Notes    In 1 week [address] [facility] 8/22/23, current order in epic, ag    In 2 weeks Jose Elias Templeton MD [facility]     In 1 month Estuardo Lopes,  [facility]                         @[email or web address]      @[email or web address]

## 2024-08-22 ENCOUNTER — MED REC SCAN ONLY (OUTPATIENT)
Dept: PHYSICAL MEDICINE AND REHAB | Facility: CLINIC | Age: 79
End: 2024-08-22

## 2024-08-26 ENCOUNTER — TELEPHONE (OUTPATIENT)
Dept: PHYSICAL MEDICINE AND REHAB | Facility: CLINIC | Age: 79
End: 2024-08-26

## 2024-08-26 NOTE — TELEPHONE ENCOUNTER
Per CMS Guidelines -no authorization is required for Left Glenohumeral joint Synvisc 1 injection under ultrasound guidance.   CPT code: 91722, ,        Status: Authorization is not required based on medical necessity however is not a guarantee of payment and may be subject to review once claim is submitted-Covered Benefit.

## 2024-08-27 ENCOUNTER — OFFICE VISIT (OUTPATIENT)
Dept: PHYSICAL MEDICINE AND REHAB | Facility: CLINIC | Age: 79
End: 2024-08-27
Payer: MEDICARE

## 2024-08-27 VITALS — HEIGHT: 66 IN | WEIGHT: 190 LBS | BODY MASS INDEX: 30.53 KG/M2

## 2024-08-27 DIAGNOSIS — M19.012 OSTEOARTHRITIS OF GLENOHUMERAL JOINT, LEFT: Primary | ICD-10-CM

## 2024-08-27 PROCEDURE — 20611 DRAIN/INJ JOINT/BURSA W/US: CPT | Performed by: PHYSICAL MEDICINE & REHABILITATION

## 2024-08-27 RX ORDER — LIDOCAINE HYDROCHLORIDE 10 MG/ML
2 INJECTION, SOLUTION INFILTRATION; PERINEURAL ONCE
Status: COMPLETED | OUTPATIENT
Start: 2024-08-27 | End: 2024-08-27

## 2024-08-27 NOTE — PROCEDURES
Dx: primary osteoarthritis of left shoulder  Procedure: left glenohumeral joint SYNVISC One injection under US guidance    The patient is here for a left shoulder injection done under ultrasound guidance.  Under ultrasound guidance the left posterior glenohumeral joint was identified and a jazmine was placed on the patient's skin.The skin was cleaned with alcohol swabs x 3 and anesthetized with 1mL of 1% PF Lidocaine without epinephrine.  Then a 22 gauge 1 1/2 inch needle was inserted under ultrasound guidance into the left posterior glenohumeral joint.  Aspiration was performed and no blood, fluid, or air was aspirated.  The patient was then injected with SYNVISC 1.  The needle was removed and a band aid was applied.     These images are permanently stored in the ultrasound unit or PACS system.    Of note, missed the last step, fell onto left lower leg and ankle.  Has a little bit of swelling in the left ankle.  She is able to walk in the examination room with mild left ankle pain, but not an overly antalgic gait.  We will hold off on x-rays.  If she is no better in 1 week's time she is to message me and I will order an x-ray of the left ankle.  She may continue using the cane, and we discussed wearing a left ankle brace as well. She should ice the ankle twice daily as needed.    Sher Loo DO  Physical Medicine and Rehabilitation  [facility]

## 2024-08-31 ENCOUNTER — HOSPITAL ENCOUNTER (OUTPATIENT)
Dept: MAMMOGRAPHY | Facility: HOSPITAL | Age: 79
Discharge: HOME | End: 2024-08-31
Attending: FAMILY MEDICINE
Payer: MEDICARE

## 2024-08-31 DIAGNOSIS — Z12.31 VISIT FOR SCREENING MAMMOGRAM: ICD-10-CM

## 2024-08-31 PROCEDURE — 77063 BREAST TOMOSYNTHESIS BI: CPT | Performed by: FAMILY MEDICINE

## 2024-08-31 PROCEDURE — 77067 SCR MAMMO BI INCL CAD: CPT | Performed by: FAMILY MEDICINE

## 2024-09-05 ENCOUNTER — OFFICE VISIT (OUTPATIENT)
Dept: SURGERY | Facility: CLINIC | Age: 79
End: 2024-09-05
Payer: MEDICARE

## 2024-09-05 VITALS
HEART RATE: 94 BPM | WEIGHT: 192.19 LBS | OXYGEN SATURATION: 98 % | HEIGHT: 66 IN | BODY MASS INDEX: 30.89 KG/M2 | SYSTOLIC BLOOD PRESSURE: 116 MMHG | DIASTOLIC BLOOD PRESSURE: 60 MMHG

## 2024-09-05 DIAGNOSIS — Z51.81 ENCOUNTER FOR THERAPEUTIC DRUG MONITORING: ICD-10-CM

## 2024-09-05 DIAGNOSIS — I10 ESSENTIAL HYPERTENSION: Primary | ICD-10-CM

## 2024-09-05 DIAGNOSIS — E66.9 OBESITY (BMI 30-39.9): ICD-10-CM

## 2024-09-05 DIAGNOSIS — F43.9 STRESS: ICD-10-CM

## 2024-09-05 PROCEDURE — 99214 OFFICE O/P EST MOD 30 MIN: CPT | Performed by: INTERNAL MEDICINE

## 2024-09-05 RX ORDER — ESCITALOPRAM OXALATE 10 MG/1
10 TABLET ORAL DAILY
Qty: 90 TABLET | Refills: 1 | Status: SHIPPED | OUTPATIENT
Start: 2024-09-05 | End: 2024-12-04

## 2024-09-05 RX ORDER — PHENTERMINE HYDROCHLORIDE 30 MG/1
30 CAPSULE ORAL EVERY MORNING
Qty: 30 CAPSULE | Refills: 5 | Status: SHIPPED | OUTPATIENT
Start: 2024-09-05

## 2024-09-05 RX ORDER — ESCITALOPRAM OXALATE 10 MG/1
10 TABLET ORAL DAILY
COMMUNITY
Start: 2024-06-12

## 2024-09-05 RX ORDER — TOPIRAMATE 25 MG/1
25 TABLET, FILM COATED ORAL DAILY
Qty: 90 TABLET | Refills: 1 | Status: SHIPPED | OUTPATIENT
Start: 2024-09-05

## 2024-09-05 NOTE — PROGRESS NOTES
[facility], [facility], [place]  [address]  [facility] [address]  Dept: [phone]       Patient:  Anna Dalton  :      1945  MRN:      [id number]    Chief Complaint:    Chief Complaint   Patient presents with    Follow - Up    Weight Management       SUBJECTIVE     History of Present Illness:  Sera is being seen today for a follow-up for non surgical weight loss.     Past Medical History:   Past Medical History:    Arthritis    left shoulder     Back problem    Cervical disc disease    Colon polyp    Esophageal reflux    High blood pressure    High cholesterol    HTN (hypertension)    Obesity (BMI 30-39.9)    Osteoarthritis    Other and unspecified hyperlipidemia    Unspecified essential hypertension    Uterine fibroid        Comorbidities:  Back pain-Improvement?  yes, Joint pain-Improvement?  yes, AURELIO-Improvement?  yes and Snoring-Improvement?  yes    OBJECTIVE     Vitals: /60   Pulse 94   Ht 5' 6\" (1.676 m)   Wt 192 lb 3.2 oz (87.2 kg)   SpO2 98%   BMI 31.02 kg/m²     Initial weight loss: +01   Total weight loss: -03   Start weight: 195    Wt Readings from Last 3 Encounters:   24 192 lb 3.2 oz (87.2 kg)   24 190 lb (86.2 kg)   24 190 lb (86.2 kg)       Patient Medications:    Current Outpatient Medications   Medication Sig Dispense Refill    escitalopram 10 MG Oral Tab Take 1 tablet (10 mg total) by mouth daily.      lansoprazole 30 MG Oral Capsule Delayed Release TAKE ONE CAPSULE BY MOUTH EVERY MORNING BEFORE BREAKFAST 90 capsule 3    traMADol 50 MG Oral Tab Take 1 tablet (50 mg total) by mouth 2 (two) times daily as needed (severe pain). 60 tablet 0    TOPIRAMATE 25 MG Oral Tab TAKE ONE TABLET BY MOUTH ONE TIME DAILY 90 tablet 0    GABAPENTIN 300 MG Oral Cap Take 2 capsules  by mouth in the morning, at noon, and at bedtime. 540 capsule 0    Norethindrone 0.35 MG Oral Tab Take 1 tablet (0.35 mg total) by  mouth daily. 84 tablet 3    clindamycin 1 % External Lotion Apply 1 Application  topically 2 (two) times daily. 60 mL 0    hydroCHLOROthiazide 12.5 MG Oral Tab Take 1 tablet (12.5 mg total) by mouth daily. 90 tablet 3    levocetirizine 5 MG Oral Tab TAKE ONE TABLET BY MOUTH EVERY DAY AT BEDTIME 90 tablet 3    acyclovir 5 % External Ointment Apply 1 g topically as needed. 30 g 0    Phentermine HCl 30 MG Oral Cap Take 1 capsule (30 mg total) by mouth every morning. 30 capsule 5    losartan 50 MG Oral Tab Take 1 tablet (50 mg total) by mouth daily. 90 tablet 3    fluticasone propionate 50 MCG/ACT Nasal Suspension SPRAY TWICE IN EACH NOSTRIL DAILY 3 each 3    amLODIPine 5 MG Oral Tab Take 1 tablet (5 mg total) by mouth daily. 90 tablet 3    clotrimazole-betamethasone 1-0.05 % External Cream Apply 1 Application topically 2 (two) times daily as needed. 45 g 3    METHOCARBAMOL 750 MG Oral Tab Take 1 tablet  by mouth Daily as needed (muscle tightness/spasms). 90 tablet 0    NAPROXEN 500 MG Oral Tab TAKE ONE TABLET BY MOUTH TWICE DAILY AS NEEDED 60 tablet 0    diclofenac 1 % External Gel Apply 4 g topically 4 (four) times daily. 1 each 0    Acetaminophen  MG Oral Tab CR Take 1 tablet (650 mg total) by mouth every 8 (eight) hours as needed for Pain.      ASPIRIN EC LOW DOSE 81 MG Oral Tab EC TAKE 1 TABLET BY MOUTH DAILY. 30 tablet 3    simvastatin 20 MG Oral Tab Take 1 tablet (20 mg total) by mouth nightly.      hydrocortisone 1 % External Cream Apply 1 Application topically 2 (two) times daily. 30 g 0    Probiotic Product (ACIDOPHILUS PROBIOTIC BLEND OR) Take  by mouth 2 (two) times daily.      Multiple Vitamins-Minerals (MULTI-VITAMIN/MINERALS) Oral Tab Take 1 tablet by mouth daily.       Allergies:  Patient has no known allergies.     Social History:    Social History     Socioeconomic History    Marital status:      Spouse name: Not on file    Number of children: 5    Years of education: Not on file     Highest education level: Not on file   Occupational History    Occupation:      Comment: retired   Tobacco Use    Smoking status: Every Day     Current packs/day: 0.50     Average packs/day: 0.5 packs/day for 15.2 years (7.6 ttl pk-yrs)     Types: Cigarettes     Start date: 2017    Smokeless tobacco: Never    Tobacco comments:     currently every day smoker, 5-6 cigs/day   Vaping Use    Vaping status: Never Used   Substance and Sexual Activity    Alcohol use: Yes     Comment: On occasion    Drug use: Never    Sexual activity: Not on file   Other Topics Concern     Service Not Asked    Blood Transfusions Not Asked    Caffeine Concern Yes     Comment: 2 cups tea/day    Occupational Exposure Not Asked    Hobby Hazards Not Asked    Sleep Concern Not Asked    Stress Concern Not Asked    Weight Concern Not Asked    Special Diet Not Asked    Back Care Not Asked    Exercise Yes     Comment: chair exercise class 1x/week, walks stairs    Bike Helmet Not Asked    Seat Belt Not Asked    Self-Exams Not Asked    Grew up on a farm Not Asked    History of tanning Not Asked    Outdoor occupation Not Asked    Breast feeding Not Asked    Reaction to local anesthetic No   Social History Narrative    The patient does not use an assistive device..      The patient does live in a home with stairs.     Social Determinants of Health     Financial Resource Strain: Not on file   Food Insecurity: Not on file   Transportation Needs: Not on file   Physical Activity: Not on file   Stress: Not on file   Social Connections: Not on file   Housing Stability: Not on file     Surgical History:    Past Surgical History:   Procedure Laterality Date      1972    Colonoscopy  2015    Colonoscopy      Hysterectomy      Ir epidural steriod injection      Lumbar spine fusion combined      Spine surgery procedure unlisted  2014    Lumbar microdiscectomy    Total abdominal hysterectomy      w/ BSO    Tubal ligation        Family History:    Family History   Problem Relation Age of Onset    Diabetes Father     Hypertension Mother     Lipids Mother     Heart Disorder Mother     Dementia Mother     Breast Cancer Sister         in her 30's.    Cancer Other         Uncles with lung cancer    Hypertension Sister        Food Journal  Reviewed and Discussed:       Patient has a Food Journal?: yes   Patient is reading nutrition labels?  yes  Average Caloric Intake:     Average CHO Intake: 130  Is patient exercising? yes  Type of exercise? ADL's  Limited due to back pain    Eating Habits  Patient states the following:  Eats 2 meal(s) per day  Length of time it takes to consume a meal:  20  # of snacks per day: 1 Type of snacks:  fruit  Amount of soda consumption per day:    Amount of water (in ounces) per day:  64  Drinking between meals only:  yes  Toughest challenge:  exercise    Nutritional Goals  Limit carbohydrates to 100 gms per day, Eat 100-200 calories within 1 hour of waking  and Eat 3-4 cups of fresh fruits or vegetables daily    Behavior Modifications Reviewed and Discussed  Eat breakfast, Eat 3 meals per day, Plan meals in advance, Read nutrition labels, Drink 64 oz of water per day, Maintain a daily food journal, No drinking 30 minutes before or after meals, Utlize portion control strategies to reduce calorie intake, Identify triggers for eating and manage cues and Eat slowly and take 20 to 30 minutes to complete each meal    Exercise Goals Reviewed and Discussed    Continue to ambulate    ROS:    Constitutional: positive for fatigue  Respiratory: negative  Cardiovascular: negative  Gastrointestinal: negative  Musculoskeletal:positive for arthralgias and back pain  Neurological: negative  Behavioral/Psych: negative  Endocrine: negative  All other systems were reviewed and are negative    Physical Exam:   General appearance: alert, cooperative and mildly obese  Head: Normocephalic, without obvious abnormality, atraumatic  Eyes:  conjunctivae/corneas clear. PERRL, EOM's intact. Fundi benign.  Lungs: clear to auscultation bilaterally  Heart: S1, S2 normal, no murmur, click, rub or gallop, regular rate and rhythm  Abdomen:  soft, obese, non tender  Extremities: extremities normal, atraumatic, no cyanosis or edema  Pulses: 2+ and symmetric    ASSESSMENT     HYPERTENSION:  The patient's blood pressure has been well controlled.  she has been checking it as instructed and has remained in relatively good control.      Encounter Diagnosis(ses):   Encounter Diagnoses   Name Primary?    Essential hypertension Yes    Stress     Encounter for therapeutic drug monitoring     Obesity (BMI 30-39.9)        PLAN     Patient is not interested in bariatric surgery. Patient desires to pursue traditional weight loss at this time.      HYPERTENSION: Blood pressure stable on the above medications. No interval change in antihypertensive medication.     DEGENERATIVE JOINT DISEASE: Of the knees is stable. Encourage upper body exercises if unable to perform weight-bearing exercises.      Goals for next month:  1. Keep a food log.  2. Drink 48-64 ounces of non-caloric beverages per day. No fruit juices or regular soda.  3. Increase activity-upper body exercises, walk 10 minutes per day.  4. Increase fruit and vegetable servings to 5-6 per day.      Tolerating topiramate. Elevated leptin noted.  Will refill    PHENTERMINE:  Tolerating well  EKG done    Follow up with PCP as scheduled     Lexapro for stress: refill at current dose      SLOW BURNER    Diagnoses and all orders for this visit:    Essential hypertension    Stress    Encounter for therapeutic drug monitoring    Obesity (BMI 30-39.9)          Jose Elias Templeton MD

## 2024-09-06 DIAGNOSIS — M48.062 SPINAL STENOSIS OF LUMBAR REGION WITH NEUROGENIC CLAUDICATION: ICD-10-CM

## 2024-09-06 RX ORDER — GABAPENTIN 300 MG/1
CAPSULE ORAL
Qty: 540 CAPSULE | Refills: 0 | Status: SHIPPED | OUTPATIENT
Start: 2024-09-06

## 2024-09-06 NOTE — TELEPHONE ENCOUNTER
Refill Request    Medication request: GABAPENTIN 300 MG Oral Cap Take 2 capsules  by mouth in the morning, at noon, and at bedtime.     LOV:8/27/2024 (inj only) Last Exam on 7/11/2024  Sher Loo DO   Due back to clinic per last office note:  No mention  NOV: Visit date not found      [place]: 6/10/2024 #540 (90-day)    Urine drug screen (if applicable): n/a  Pain contract: n/a    LOV plan (if weaning or changing medications): Per Dr. Loo's LOV notes: \"Recommend aquatic therapy for the lower back, tramadol 50mg BID PRN severe pain. \" (No mention of gabapentin at LOV)        NOTE: Patient is on stable dose of gabapentin- Last labs on  2/21/2024 GFR=58    Dr. Loo protocol is no more than 600 mg / 24 hours period for GFR < 60.  Patient is on 1800 mg / 24 hours. Routed to Dr. Loo for review & approval.

## 2024-09-25 ENCOUNTER — MED REC SCAN ONLY (OUTPATIENT)
Dept: PHYSICAL MEDICINE AND REHAB | Facility: CLINIC | Age: 79
End: 2024-09-25

## 2024-09-27 ENCOUNTER — TELEPHONE (OUTPATIENT)
Dept: FAMILY MEDICINE CLINIC | Facility: CLINIC | Age: 79
End: 2024-09-27

## 2024-09-27 DIAGNOSIS — I10 ESSENTIAL HYPERTENSION: ICD-10-CM

## 2024-09-27 NOTE — TELEPHONE ENCOUNTER
Patient asking for a Medicare Annual Visit sooner than 12-31-24, only availability this year with Dr Lopes. Declines another provider.    We canceled her 9-27 Medicare visit with Dr Lopes today.  No notes with another offer.    She cannot do Mondays. She can do any other day, please no early morning.    Call her at: [phone]   Okay to leave a voicemail

## 2024-10-01 RX ORDER — AMLODIPINE BESYLATE 5 MG/1
5 TABLET ORAL DAILY
Qty: 90 TABLET | Refills: 0 | Status: SHIPPED | OUTPATIENT
Start: 2024-10-01

## 2024-10-01 NOTE — TELEPHONE ENCOUNTER
Please review; protocol failed    Future Appointments   Date Time Provider Department Center   11/11/2024  1:20 PM Estuardo Lopes DO [facility]     Last office visit and lab work: 9/7/2023    Requested Prescriptions   Pending Prescriptions Disp Refills    AMLODIPINE 5 MG Oral Tab [Pharmacy Med Name: Amlodipine Besylate 5 Mg Tab Unic] 90 tablet 0     Sig: Take 1 tablet (5 mg total) by mouth daily.       Hypertension Medications Protocol Failed - 9/27/2024  3:00 AM        Failed - CMP or BMP in past 12 months        Passed - Last BP reading less than 140/90     BP Readings from Last 1 Encounters:   09/27/24 135/69               Passed - In person appointment or virtual visit in the past 12 mos or appointment in next 3 mos     Recent Outpatient Visits              3 weeks ago [facility]    Office Visit    1 month ago Osteoarthritis of glenohumeral joint, left [M19.012]    [facility]    Office Visit    2 months ago Osteoarthritis of left glenohumeral joint    [facility]    Office Visit    5 months ago Spinal stenosis of lumbar region with neurogenic claudication    [facility]    Office Visit    6 months ago Essential hypertension    [facility]    [facility] Visit          Future Appointments         Provider Department Appt Notes    In 1 month Estuardo Lopes DO [facility]     In 5 months [facility]                     Passed - EGFRCR or GFRAA > 50     GFR Evaluation  EGFRCR: 58 , resulted on 2/21/2024               Future Appointments         Provider Department Appt Notes    In 1  month Estuardo Lopes DO [facility]     In 5 months Jose Elias Templeton MD [facility]           Recent Outpatient Visits              [facility] weeks [facility]    Office Visit    1 month ago Osteoarthritis of glenohumeral joint, left [M19.012]    [facility]    Office Visit    2 months ago Osteoarthritis of left glenohumeral joint    [facility]    Office Visit    5 months ago Spinal stenosis of lumbar region with neurogenic claudication    [facility]    Office Visit    6 months ago [facility]ential [facility]    Office Visit

## 2024-10-15 DIAGNOSIS — M48.062 SPINAL STENOSIS OF LUMBAR REGION WITH NEUROGENIC CLAUDICATION: ICD-10-CM

## 2024-10-15 RX ORDER — METHOCARBAMOL 750 MG/1
TABLET, FILM COATED ORAL
Qty: 90 TABLET | Refills: 0 | Status: SHIPPED | OUTPATIENT
Start: 2024-10-15

## 2024-10-15 NOTE — TELEPHONE ENCOUNTER
Refill Request    Medication request: METHOCARBAMOL 750 MG Oral Tab. Take 1 tablet  by mouth Daily as needed (muscle tightness/spasms).     LOV: 08/27/2024 with Sher Loo D.O.  Due back to clinic per last office note: Per Dr. Loo: \"If she is no better in 1 week's time she is to message me and I will order an x-ray of the left ankle.\"  NOV: Visit date not found      [place]: data not available    Urine drug screen (if applicable): n/a  Pain contract: n/a    LOV plan (if weaning or changing medications): not mentioned in LOV note.

## 2024-11-11 ENCOUNTER — LAB ENCOUNTER (OUTPATIENT)
Dept: LAB | Age: 79
End: 2024-11-11
Attending: FAMILY MEDICINE
Payer: MEDICARE

## 2024-11-11 DIAGNOSIS — Z00.00 MEDICARE ANNUAL WELLNESS VISIT, SUBSEQUENT: ICD-10-CM

## 2024-11-11 LAB
ALBUMIN SERPL-MCNC: 4.4 G/DL (ref 3.2–4.8)
ALBUMIN/GLOB SERPL: 1.5 {RATIO} (ref 1–2)
ALP LIVER SERPL-CCNC: 102 U/L
ALT SERPL-CCNC: 16 U/L
ANION GAP SERPL CALC-SCNC: 7 MMOL/L (ref 0–18)
AST SERPL-CCNC: 18 U/L (ref ?–34)
BASOPHILS # BLD AUTO: 0.03 X10(3) UL (ref 0–0.2)
BASOPHILS NFR BLD AUTO: 0.4 %
BILIRUB SERPL-MCNC: 0.5 MG/DL (ref 0.2–1.1)
BILIRUB UR QL: NEGATIVE
BUN BLD-MCNC: 12 MG/DL (ref 9–23)
BUN/CREAT SERPL: 13.8 (ref 10–20)
CALCIUM BLD-MCNC: 10 MG/DL (ref 8.7–10.4)
CHLORIDE SERPL-SCNC: 110 MMOL/L (ref 98–112)
CHOLEST SERPL-MCNC: 179 MG/DL (ref ?–200)
CLARITY UR: CLEAR
CO2 SERPL-SCNC: 27 MMOL/L (ref 21–32)
COLOR UR: COLORLESS
CREAT BLD-MCNC: 0.87 MG/DL
DEPRECATED RDW RBC AUTO: 45.4 FL (ref 35.1–46.3)
EGFRCR SERPLBLD CKD-EPI 2021: 69 ML/MIN/1.73M2 (ref 60–?)
EOSINOPHIL # BLD AUTO: 0.08 X10(3) UL (ref 0–0.7)
EOSINOPHIL NFR BLD AUTO: 1 %
ERYTHROCYTE [DISTWIDTH] IN BLOOD BY AUTOMATED COUNT: 13.2 % (ref 11–15)
FASTING PATIENT LIPID ANSWER: NO
FASTING STATUS PATIENT QL REPORTED: NO
GLOBULIN PLAS-MCNC: 3 G/DL (ref 2–3.5)
GLUCOSE BLD-MCNC: 82 MG/DL (ref 70–99)
GLUCOSE UR-MCNC: NORMAL MG/DL
HCT VFR BLD AUTO: 42.1 %
HDLC SERPL-MCNC: 47 MG/DL (ref 40–59)
HGB BLD-MCNC: 12.7 G/DL
HGB UR QL STRIP.AUTO: NEGATIVE
IMM GRANULOCYTES # BLD AUTO: 0.01 X10(3) UL (ref 0–1)
IMM GRANULOCYTES NFR BLD: 0.1 %
KETONES UR-MCNC: NEGATIVE MG/DL
LDLC SERPL CALC-MCNC: 103 MG/DL (ref ?–100)
LEUKOCYTE ESTERASE UR QL STRIP.AUTO: NEGATIVE
LYMPHOCYTES # BLD AUTO: 2.06 X10(3) UL (ref 1–4)
LYMPHOCYTES NFR BLD AUTO: 26.9 %
MCH RBC QN AUTO: 28 PG (ref 26–34)
MCHC RBC AUTO-ENTMCNC: 30.2 G/DL (ref 31–37)
MCV RBC AUTO: 92.7 FL
MONOCYTES # BLD AUTO: 0.72 X10(3) UL (ref 0.1–1)
MONOCYTES NFR BLD AUTO: 9.4 %
NEUTROPHILS # BLD AUTO: 4.77 X10 (3) UL (ref 1.5–7.7)
NEUTROPHILS # BLD AUTO: 4.77 X10(3) UL (ref 1.5–7.7)
NEUTROPHILS NFR BLD AUTO: 62.2 %
NITRITE UR QL STRIP.AUTO: NEGATIVE
NONHDLC SERPL-MCNC: 132 MG/DL (ref ?–130)
OSMOLALITY SERPL CALC.SUM OF ELEC: 297 MOSM/KG (ref 275–295)
PH UR: 7 [PH] (ref 5–8)
PLATELET # BLD AUTO: 236 10(3)UL (ref 150–450)
POTASSIUM SERPL-SCNC: 3.7 MMOL/L (ref 3.5–5.1)
PROT SERPL-MCNC: 7.4 G/DL (ref 5.7–8.2)
PROT UR-MCNC: NEGATIVE MG/DL
RBC # BLD AUTO: 4.54 X10(6)UL
SODIUM SERPL-SCNC: 144 MMOL/L (ref 136–145)
SP GR UR STRIP: 1 (ref 1–1.03)
TRIGL SERPL-MCNC: 168 MG/DL (ref 30–149)
TSI SER-ACNC: 0.88 UIU/ML (ref 0.55–4.78)
UROBILINOGEN UR STRIP-ACNC: NORMAL
VLDLC SERPL CALC-MCNC: 28 MG/DL (ref 0–30)
WBC # BLD AUTO: 7.7 X10(3) UL (ref 4–11)

## 2024-11-11 PROCEDURE — 80053 COMPREHEN METABOLIC PANEL: CPT

## 2024-11-11 PROCEDURE — 84443 ASSAY THYROID STIM HORMONE: CPT

## 2024-11-11 PROCEDURE — 85025 COMPLETE CBC W/AUTO DIFF WBC: CPT

## 2024-11-11 PROCEDURE — 36415 COLL VENOUS BLD VENIPUNCTURE: CPT

## 2024-11-11 PROCEDURE — 81003 URINALYSIS AUTO W/O SCOPE: CPT

## 2024-11-11 PROCEDURE — 80061 LIPID PANEL: CPT

## 2024-11-18 ENCOUNTER — TELEPHONE (OUTPATIENT)
Dept: FAMILY MEDICINE CLINIC | Facility: CLINIC | Age: 79
End: 2024-11-18

## 2024-11-18 NOTE — H&P
[facility] - Gastroenterology                                                                                                               Reason for consult: eval    Requesting physician or provider: Estuardo Lopes DO    Chief Complaint   Patient presents with    Consult     Hiatal hernia; gerd       HPI:   Anna Dalton is a 79 year old year-old female with history of htn, arthritis, back problem, colon polyp, gerd, htn, high cholesterol, obesity, oa, uterine fibroid:    she is here today for f/U  Reflux and epigastric burning x last few mos. Sx at night.  Sx while on lansoprazole 30 mg/daily  Has been using rolaids and gingerale for breakthrough.  Has also had stomach growling, bloating.  Sx not improved with bm.   Worsening of sx without change in diet, activity, medication  she denies dysphagia, odynophagia and/or globus. she denies abdominal pain. she denies nausea and/or vomiting.  she denies recent change in appetite and/or unintentional weight loss.    she moves her bowels daily.Uses stool softener.  she denies brbpr and/or melena.    NSAIDS: as 81 mg  Tobacco: current  Alcohol: occasional  Marijuana: no  Illicit drugs: no    No FH GI malignancy    No history of adverse reaction to sedation  No ALAN  No anticoagulants  No pacemaker/defibrillator  No pain medications and/or sleep aides      Last colonoscopy: 11/2020    COLONOSCOPY FINDINGS:    Mild melanosis coli throughout.  Sessile 5 mm polyp removed from the proximal ascending colon by cold snare polypectomy.  The specimen fragmented as we retrieved it.  May not have retrieved the piece with suspected adenomatous tissue.  Sessile 6 mm polypoid lesion versus suction artifact removed from the sigmoid colon by cold snare polypectomy, suctioned out.  Small internal hemorrhoids.       RECOMMENDATIONS:  High fiber diet.  Follow-up above colon polyp  pathology results.  Would not repeat colonoscopy examination at this point unless clinical change, bleeding, anemia, symptoms.  Today's polyps were of minimal significance.  No aspirin or NSAID medications for next 5 days to prevent bleeding  Final Diagnosis:      A. Ascending colon polyp:   Fragments of colonic mucosa with focal hyperplastic change.  Numerous pigmented histiocytes in lamina propria, compatible with melanosis coli.     B. Sigmoid colon polyp:  Fragments of colonic mucosa with focal hyperplastic change.  Numerous pigmented histiocytes in lamina propria, compatible with melanosis coli.     C-scope 2015    FINAL DIAGNOSIS                       Colon, descending, polyp:     *  Tubular adenoma, fragments.     Last EGD:    Wt Readings from Last 6 Encounters:   24 193 lb (87.5 kg)   24 193 lb (87.5 kg)   24 193 lb (87.5 kg)   24 192 lb (87.1 kg)   24 192 lb 3.2 oz (87.2 kg)   24 190 lb (86.2 kg)        History, Medications, Allergies, ROS:      Past Medical History:    Arthritis    left shoulder     Back problem    Cervical disc disease    Colon polyp    Esophageal reflux    High blood pressure    High cholesterol    HTN (hypertension)    Obesity (BMI 30-39.9)    Osteoarthritis    Other and unspecified hyperlipidemia    Unspecified essential hypertension    Uterine fibroid      Past Surgical History:   Procedure Laterality Date      1972    Colonoscopy  2015    Colonoscopy      Hysterectomy      Ir epidural steriod injection      Lumbar spine fusion combined      Spine surgery procedure unlisted  2014    Lumbar microdiscectomy    Total abdominal hysterectomy      w/ BSO    Tubal ligation        Family Hx:   Family History   Problem Relation Age of Onset    Diabetes Father     Hypertension Mother     Lipids Mother     Heart Disorder Mother     Dementia Mother     Breast Cancer Sister         in her 30's.    Cancer Other         Uncles with lung  cancer    Hypertension Sister       Social History:   Social History     Socioeconomic History    Marital status:     Number of children: 5   Occupational History    Occupation:      Comment: retired   Tobacco Use    Smoking status: Every Day     Current packs/day: 0.50     Average packs/day: 0.5 packs/day for 15.4 years (7.7 ttl pk-yrs)     Types: Cigarettes     Start date: 12/26/2017    Smokeless tobacco: Never    Tobacco comments:     currently every day smoker, 5-6 cigs/day   Vaping Use    Vaping status: Never Used   Substance and Sexual Activity    Alcohol use: Yes     Comment: On occasion    Drug use: Never   Other Topics Concern    Caffeine Concern Yes     Comment: 2 cups tea/day    Exercise Yes     Comment: chair exercise class 1x/week, walks stairs    Reaction to local anesthetic No   Social History Narrative    The patient does not use an assistive device..      The patient does live in a home with stairs.        Medications (Active prior to today's visit):  Current Outpatient Medications   Medication Sig Dispense Refill    Omeprazole 40 MG Oral Capsule Delayed Release Take 1 capsule (40 mg total) by mouth daily. Take 1 capsule by mouth daily before breakfast. 30 capsule 3    METHOCARBAMOL 750 MG Oral Tab Take 1 tablet  by mouth Daily as needed (muscle tightness/spasms). 90 tablet 0    amLODIPine 5 MG Oral Tab Take 1 tablet (5 mg total) by mouth daily. **please address all refills at your upcoming appointment. 90 tablet 0    GABAPENTIN 300 MG Oral Cap Take 2 capsules by mouth in the morning, at noon, and at bedtime. 540 capsule 0    escitalopram 10 MG Oral Tab Take 1 tablet (10 mg total) by mouth daily.      Phentermine HCl 30 MG Oral Cap Take 1 capsule (30 mg total) by mouth every morning. 30 capsule 5    escitalopram 10 MG Oral Tab Take 1 tablet (10 mg total) by mouth daily. 90 tablet 1    topiramate 25 MG Oral Tab Take 1 tablet (25 mg total) by mouth daily. 90 tablet 1    traMADol 50 MG  Oral Tab Take 1 tablet (50 mg total) by mouth 2 (two) times daily as needed (severe pain). 60 tablet 0    Norethindrone 0.35 MG Oral Tab Take 1 tablet (0.35 mg total) by mouth daily. 84 tablet 3    clindamycin 1 % External Lotion Apply 1 Application  topically 2 (two) times daily. 60 mL 0    hydroCHLOROthiazide 12.5 MG Oral Tab Take 1 tablet (12.5 mg total) by mouth daily. 90 tablet 3    levocetirizine 5 MG Oral Tab TAKE ONE TABLET BY MOUTH EVERY DAY AT BEDTIME 90 tablet 3    acyclovir 5 % External Ointment Apply 1 g topically as needed. 30 g 0    losartan 50 MG Oral Tab Take 1 tablet (50 mg total) by mouth daily. 90 tablet 3    fluticasone propionate 50 MCG/ACT Nasal Suspension SPRAY TWICE IN EACH NOSTRIL DAILY 3 each 3    clotrimazole-betamethasone 1-0.05 % External Cream Apply 1 Application topically 2 (two) times daily as needed. 45 g 3    NAPROXEN 500 MG Oral Tab TAKE ONE TABLET BY MOUTH TWICE DAILY AS NEEDED 60 tablet 0    diclofenac 1 % External Gel Apply 4 g topically 4 (four) times daily. 1 each 0    Acetaminophen  MG Oral Tab CR Take 1 tablet (650 mg total) by mouth every 8 (eight) hours as needed for Pain.      ASPIRIN EC LOW DOSE 81 MG Oral Tab EC TAKE 1 TABLET BY MOUTH DAILY. 30 tablet 3    simvastatin 20 MG Oral Tab Take 1 tablet (20 mg total) by mouth nightly.      hydrocortisone 1 % External Cream Apply 1 Application topically 2 (two) times daily. 30 g 0    Probiotic Product (ACIDOPHILUS PROBIOTIC BLEND OR) Take  by mouth 2 (two) times daily.      Multiple Vitamins-Minerals (MULTI-VITAMIN/MINERALS) Oral Tab Take 1 tablet by mouth daily.         Allergies:  Allergies[1]    ROS:   CONSTITUTIONAL: negative for fevers, chills, sweats and weight loss  EYES Negative for red eyes, yellow eyes, changes in vision  HEENT: Negative for dysphagia and hoarseness  RESPIRATORY: Negative for cough and shortness of breath  CARDIOVASCULAR: Negative for chest pain, palpitations  GASTROINTESTINAL: See  HPI  GENITOURINARY: Negative for dysuria and frequency  MUSCULOSKELETAL: Negative for arthralgias and myalgias  NEUROLOGICAL: Negative for dizziness and headaches  BEHAVIOR/PSYCH: Negative for anxiety and poor appetite    PHYSICAL EXAM:   Weight 193 lb (87.5 kg), not currently breastfeeding.    GEN: WD/WN, NAD  HEENT: Supple symmetrical, trachea midline  CV: RRR, the extremities are warm and well perfused   LUNGS: No increased work of breathing  ABDOMEN: No scars, normal bowel sounds, soft, non-tender, non-distended no rebound or guarding, no masses, no hepatomegaly  MSK: No redness, no warmth, no swelling of joints  SKIN: No jaundice, no erythema, no rashes  HEMATOLOGIC: No bleeding, no bruising  NEURO: Alert and interactive, normal gait    Labs/Imaging/Procedures:     Patient's pertinent labs and imaging were reviewed and discussed with patient today.        .  ASSESSMENT/PLAN:   Anna Dalton is a 79 year old year-old female with history of htn, arthritis, back problem, colon polyp, gerd, htn, high cholesterol, obesity, oa, uterine fibroid:    #crc screening  Consider c-scope 11/2027    #gerd  #epigastric burning  #stomach growling  #bloating  Sx all x last few mos and while on lansoprazole 30 mg/daily.  Has been using otc for breakthrough. No n/v, dysphagia, melena Sx without inciting event.  H/o mild constipation improved with daily stool softener. Bloating not improved with bm.  No fhx gi malignancy. Is current smoker. Has not had egd,    -reflux diet modification  -smoking cessation  -stop lansoprazole and start omeprazole  -use pepcid for breakthrough  -ct a/p   -consider c-scope 11/2027     Egd w/mac w/ gen pool  MD  Dx: #gerd  #epigastric burning  #stomach growling  #bloating    Hold phentermine 7 days prior    Orders This Visit:  No orders of the defined types were placed in this encounter.      Meds This Visit:  Requested Prescriptions     Signed Prescriptions Disp Refills    Omeprazole 40 MG  Oral Capsule Delayed Release 30 capsule 3     Sig: Take 1 capsule (40 mg total) by mouth daily. Take 1 capsule by mouth daily before breakfast.       Imaging & Referrals:  CT ABDOMEN+PELVIS(CONTRAST ONLY)(CPT=74177)    ENDOSCOPIC RISK BENEFIT DISCUSSION: I described the procedure in great detail with the patient. I discussed the risks and benefits, including but not limited to: bleeding, perforation, infection, anesthesia complications, and even death. Patient will be NPO after midnight and will have a person physically present at time of pick-up to drive patient home. Patient verbalized understanding and agrees to proceed with procedure as planned.      Kellie Claire, APRN   11/21/2024        This note was partially prepared using Dragon Medical voice recognition dictation software. As a result, errors may occur. When identified, these errors have been corrected. While every attempt is made to correct errors during dictation, discrepancies may still exist.          [1] No Known Allergies

## 2024-11-21 ENCOUNTER — OFFICE VISIT (OUTPATIENT)
Dept: ORTHOPEDICS CLINIC | Facility: CLINIC | Age: 79
End: 2024-11-21
Payer: MEDICARE

## 2024-11-21 ENCOUNTER — HOSPITAL ENCOUNTER (OUTPATIENT)
Dept: GENERAL RADIOLOGY | Facility: HOSPITAL | Age: 79
Discharge: HOME | End: 2024-11-21
Attending: ORTHOPAEDIC SURGERY
Payer: MEDICARE

## 2024-11-21 ENCOUNTER — TELEPHONE (OUTPATIENT)
Facility: CLINIC | Age: 79
End: 2024-11-21

## 2024-11-21 ENCOUNTER — OFFICE VISIT (OUTPATIENT)
Dept: GASTROENTEROLOGY | Facility: CLINIC | Age: 79
End: 2024-11-21
Payer: MEDICARE

## 2024-11-21 VITALS
HEIGHT: 66 IN | HEART RATE: 68 BPM | DIASTOLIC BLOOD PRESSURE: 72 MMHG | WEIGHT: 193 LBS | BODY MASS INDEX: 31.02 KG/M2 | SYSTOLIC BLOOD PRESSURE: 135 MMHG

## 2024-11-21 VITALS — WEIGHT: 193 LBS | BODY MASS INDEX: 31 KG/M2

## 2024-11-21 DIAGNOSIS — K21.9 GASTROESOPHAGEAL REFLUX DISEASE, UNSPECIFIED WHETHER ESOPHAGITIS PRESENT: Primary | ICD-10-CM

## 2024-11-21 DIAGNOSIS — R10.13 EPIGASTRIC BURNING SENSATION: ICD-10-CM

## 2024-11-21 DIAGNOSIS — R19.8 STOMACH GROWLING: ICD-10-CM

## 2024-11-21 DIAGNOSIS — R14.0 BLOATING: Primary | ICD-10-CM

## 2024-11-21 DIAGNOSIS — K21.9 GASTROESOPHAGEAL REFLUX DISEASE, UNSPECIFIED WHETHER ESOPHAGITIS PRESENT: ICD-10-CM

## 2024-11-21 DIAGNOSIS — M19.012 GLENOHUMERAL ARTHRITIS, LEFT: Primary | ICD-10-CM

## 2024-11-21 DIAGNOSIS — R52 PAIN: ICD-10-CM

## 2024-11-21 DIAGNOSIS — R14.0 BLOATING: ICD-10-CM

## 2024-11-21 DIAGNOSIS — Z12.11 COLON CANCER SCREENING: ICD-10-CM

## 2024-11-21 PROCEDURE — 73030 X-RAY EXAM OF SHOULDER: CPT | Performed by: ORTHOPAEDIC SURGERY

## 2024-11-21 PROCEDURE — 99204 OFFICE O/P NEW MOD 45 MIN: CPT | Performed by: ORTHOPAEDIC SURGERY

## 2024-11-21 PROCEDURE — 99204 OFFICE O/P NEW MOD 45 MIN: CPT | Performed by: NURSE PRACTITIONER

## 2024-11-21 PROCEDURE — 20610 DRAIN/INJ JOINT/BURSA W/O US: CPT | Performed by: ORTHOPAEDIC SURGERY

## 2024-11-21 RX ORDER — OMEPRAZOLE 40 MG/1
40 CAPSULE, DELAYED RELEASE ORAL DAILY
Qty: 30 CAPSULE | Refills: 3 | Status: SHIPPED | OUTPATIENT
Start: 2024-11-21

## 2024-11-21 RX ORDER — TRIAMCINOLONE ACETONIDE 40 MG/ML
40 INJECTION, SUSPENSION INTRA-ARTICULAR; INTRAMUSCULAR ONCE
Status: COMPLETED | OUTPATIENT
Start: 2024-11-21 | End: 2024-11-21

## 2024-11-21 NOTE — PROCEDURES
Per verbal order from Dr. Bonilla draw up 3ml of 0.5% Marcaine & 2ml 1% lidocaine and 1ml of Kenalog 40 for cortisone injection to left shoulder.

## 2024-11-21 NOTE — PROGRESS NOTES
NURSING INTAKE COMMENTS:   Chief Complaint   Patient presents with    Shoulder Pain     Referred by  for tear of left rotator cuff, has been having pain in left shoulder for over an year. Rates pain 8/10. Treating with OTC tylenol and tramadol       HPI: This 79 year old female presents today with complaints of left shoulder pain.  She had progressive pain in the left shoulder for the past few years.  No history of injury.  She does some gardening at home and enjoys walking.  She has had problems with the shoulder for a number of years progressive.  She reports pain with overhead positioning the arm.  She has clicking and popping as well as night pain.  She has a history of arthritis.  She tried tramadol Voltaren gel and Tylenol arthritis.  She said recent Synvisc 1 injection to the shoulder back in August which did not necessarily help.  She also had a Kenalog injection back in July with minimal improvements.  She had lumbar surgery back in .  She sees Dr. Loo in physiatry    Past Medical History:    Arthritis    left shoulder     Back problem    Cervical disc disease    Colon polyp    Esophageal reflux    High blood pressure    High cholesterol    HTN (hypertension)    Obesity (BMI 30-39.9)    Osteoarthritis    Other and unspecified hyperlipidemia    Unspecified essential hypertension    Uterine fibroid     Past Surgical History:   Procedure Laterality Date      1972    Colonoscopy  2015    Colonoscopy      Hysterectomy      Ir epidural steriod injection      Lumbar spine fusion combined      Spine surgery procedure unlisted  2014    Lumbar microdiscectomy    Total abdominal hysterectomy      w/ BSO    Tubal ligation       Current Outpatient Medications   Medication Sig Dispense Refill    METHOCARBAMOL 750 MG Oral Tab Take 1 tablet  by mouth Daily as needed (muscle tightness/spasms). 90 tablet 0    amLODIPine 5 MG Oral Tab Take 1 tablet (5 mg total) by mouth daily. **please  address all refills at your upcoming appointment. 90 tablet 0    GABAPENTIN 300 MG Oral Cap Take 2 capsules by mouth in the morning, at noon, and at bedtime. 540 capsule 0    escitalopram 10 MG Oral Tab Take 1 tablet (10 mg total) by mouth daily.      Phentermine HCl 30 MG Oral Cap Take 1 capsule (30 mg total) by mouth every morning. 30 capsule 5    escitalopram 10 MG Oral Tab Take 1 tablet (10 mg total) by mouth daily. 90 tablet 1    topiramate 25 MG Oral Tab Take 1 tablet (25 mg total) by mouth daily. 90 tablet 1    lansoprazole 30 MG Oral Capsule Delayed Release TAKE ONE CAPSULE BY MOUTH EVERY MORNING BEFORE BREAKFAST 90 capsule 3    traMADol 50 MG Oral Tab Take 1 tablet (50 mg total) by mouth 2 (two) times daily as needed (severe pain). 60 tablet 0    Norethindrone 0.35 MG Oral Tab Take 1 tablet (0.35 mg total) by mouth daily. 84 tablet 3    clindamycin 1 % External Lotion Apply 1 Application  topically 2 (two) times daily. 60 mL 0    hydroCHLOROthiazide 12.5 MG Oral Tab Take 1 tablet (12.5 mg total) by mouth daily. 90 tablet 3    levocetirizine 5 MG Oral Tab TAKE ONE TABLET BY MOUTH EVERY DAY AT BEDTIME 90 tablet 3    acyclovir 5 % External Ointment Apply 1 g topically as needed. 30 g 0    losartan 50 MG Oral Tab Take 1 tablet (50 mg total) by mouth daily. 90 tablet 3    fluticasone propionate 50 MCG/ACT Nasal Suspension SPRAY TWICE IN EACH NOSTRIL DAILY 3 each 3    clotrimazole-betamethasone 1-0.05 % External Cream Apply 1 Application topically 2 (two) times daily as needed. 45 g 3    diclofenac 1 % External Gel Apply 4 g topically 4 (four) times daily. 1 each 0    Acetaminophen  MG Oral Tab CR Take 1 tablet (650 mg total) by mouth every 8 (eight) hours as needed for Pain.      ASPIRIN EC LOW DOSE 81 MG Oral Tab EC TAKE 1 TABLET BY MOUTH DAILY. 30 tablet 3    simvastatin 20 MG Oral Tab Take 1 tablet (20 mg total) by mouth nightly.      hydrocortisone 1 % External Cream Apply 1 Application topically 2  (two) times daily. 30 g 0    Probiotic Product (ACIDOPHILUS PROBIOTIC BLEND OR) Take  by mouth 2 (two) times daily.      Multiple Vitamins-Minerals (MULTI-VITAMIN/MINERALS) Oral Tab Take 1 tablet by mouth daily.      NAPROXEN 500 MG Oral Tab TAKE ONE TABLET BY MOUTH TWICE DAILY AS NEEDED (Patient not taking: Reported on 11/21/2024) 60 tablet 0     Allergies[1]  Family History   Problem Relation Age of Onset    Diabetes Father     Hypertension Mother     Lipids Mother     Heart Disorder Mother     Dementia Mother     Breast Cancer Sister         in her 30's.    Cancer Other         Uncles with lung cancer    Hypertension Sister        Social History     Occupational History    Occupation:      Comment: retired   Tobacco Use    Smoking status: Every Day     Current packs/day: 0.50     Average packs/day: 0.5 packs/day for 15.4 years (7.7 ttl pk-yrs)     Types: Cigarettes     Start date: 12/26/2017    Smokeless tobacco: Never    Tobacco comments:     currently every day smoker, 5-6 cigs/day   Vaping Use    Vaping status: Never Used   Substance and Sexual Activity    Alcohol use: Yes     Comment: On occasion    Drug use: Never    Sexual activity: Not on file        Review of Systems:  GENERAL: denies fevers, chills, night sweats, fatigue, unintentional weight loss/gain  SKIN: denies skin lesions, open sores, rash  HEENT:denies recent vision change, new nasal congestion,hearing loss, tinnitus, sore throat, headaches  RESPIRATORY: denies new shortness of breath, cough, asthma, wheezing  CARDIOVASCULAR: denies chest pain, leg cramps with exertion, palpitations, leg swelling  GI: denies abdominal pain, nausea, vomiting, diarrhea, constipation, hematochezia, worsening heartburn or stomach ulcers  : denies dysuria, hematuria, incontinence, increased frequency, urgency, difficulty urinating  MUSCULOSKELETAL: denies musculoskeletal complaints other than in HPI  NEURO: denies numbness, tingling, weakness, balance  issues, dizziness, memory loss  PSYCHIATRIC: denies Hx of depression, anxiety, other psychiatric disorders  HEMATOLOGIC: denies blood clots, anemia, blood clotting disorders, blood transfusion  ENDOCRINE: denies autoimmune disease, thyroid issues, or diabetes  ALLERGY: denies asthma, seasonal allergies    Physical Examination:    /72 (BP Location: Right arm, Patient Position: Sitting, Cuff Size: large)   Pulse 68   Ht 5' 6\" (1.676 m)   Wt 193 lb (87.5 kg)   BMI 31.15 kg/m²   Constitutional: appears well hydrated, alert and responsive, no acute distress noted  Extremities: Cervical spine range of motion slightly restricted restricted in extension lateral bending.  Spurling sign negative.    Semination of left shoulder reveals no obvious atrophy or prominence.  She is tender at the anterior glenohumeral joint line.  Active forward flexion to 80 degrees with pain.  Passive flexion 100 degrees.  Active and passive external rotation 40 degrees.  Passive internal Tatian 30 degrees with pain.  Abduction and external rotation motor strength 5 out of 5.  Belly press 4+ out of 5.  Neville and Neer impingement signs are positive.  Speed's Test equivocal.  No palpable instability with anterior posterior load-and-shift maneuver  Neurological: Left hand light touch and pinprick sensory exam normal. Lateral shoulder light touch and pinprick sensory examination normal.  strength,wrist extension, biceps, triceps, and shoulder abduction strength 5 out of 5. Brittany sign negative. No obvious atrophy of the hand.        Imaging:   XR SHOULDER, COMPLETE (MIN 2 VIEWS), LEFT (CPT=73030)    Result Date: 11/21/2024  PROCEDURE: XR SHOULDER, COMPLETE (MIN 2 VIEWS), LEFT (CPT=73030)  COMPARISON: None.  INDICATIONS: Chronic deep left shoulder pain; no known trauma.  TECHNIQUE: 3 views were obtained.   FINDINGS: See findings in CONCLUSION         CONCLUSION:  1. No acute fracture or dislocation. 2. Severe left shoulder  osteoarthritic changes with marked joint space narrowing chronic bone-on-bone appearance bony remodeling and osseous changes. 3. Bony proliferation and periarticular dystrophic calcifications in the axillary recess. 4. Hypertrophic degeneration left AC joint. 5. S shaped scoliosis dorsal spine multilevel spondylosis     Dictated by (CST): Aram Benjamin MD on 11/21/2024 at 8:55 AM     Finalized by (CST): Aram Benjamin MD on 11/21/2024 at 8:57 AM             Labs:  Lab Results   Component Value Date    WBC 7.7 11/11/2024    HGB 12.7 11/11/2024    .0 11/11/2024      Lab Results   Component Value Date    GLU 82 11/11/2024    BUN 12 11/11/2024    CREATSERUM 0.87 11/11/2024    GFR 55 (L) 04/18/2016    GFRNAA 57 (L) 07/28/2022    GFRAA 65 07/28/2022        Assessment and Plan:  Diagnoses and all orders for this visit:    Glenohumeral arthritis, left  -     arthrocentesis major joint  -     triamcinolone acetonide (Kenalog-40) 40 MG/ML injection 40 mg    Pain  -     Cancel: XR SHOULDER, COMPLETE (MIN 2 VIEWS), RIGHT (CPT=73030); Future        Assessment: Left shoulder glenohumeral arthritis    Plan: I discussed operative and nonoperative treatment options for the shoulder.  I think she would do well with a total shoulder arthroplasty.  I would like to make recommended reverse total shoulder arthroplasty given her advanced age and shoulder weakness.  She is not interested in surgery today.  She elected for repeat injections today.  The glenohumeral joint was injected with 40 mg of Kenalog using an anterior approach.  Advised follow-up again in 3 to 4 months as needed.  Advised icing, topical Voltaren gel, oral anti-inflammatory use, activity modifications.    Follow Up: Return if symptoms worsen or fail to improve.    SAADIA BARROW MD       [1] No Known Allergies

## 2024-11-21 NOTE — PATIENT INSTRUCTIONS
-reflux diet modification  -smoking cessation  -stop lansoprazole and start omeprazole  -use pepcid for breakthrough  -ct a/p   -consider c-scope 11/2027     Egd w/mac w/ gen pool  MD  Dx: #gerd  #epigastric burning  #stomach growling  #bloating    Hold phentermine 7 days prior    Tips to Control Acid Reflux    To control acid reflux, you’ll need to make some basic diet and lifestyle changes. The simple steps outlined below may be all you’ll need to ease discomfort.   Watch what you eat  Don't have fatty foods or spicy foods.  Eat fewer acidic foods, such as citrus and tomato-based foods. These can increase symptoms.  Limit drinking alcohol, caffeine, and fizzy beverages. All increase acid reflux.  Try limiting chocolate, peppermint, and spearmint. These can make acid reflux worse in some people.     Watch when you eat  Don't lie down for 3 hours after eating.  Don't snack before going to bed.     Raise your head  Raising your head and upper body by 4 to 6 inches helps limit reflux when you’re lying down. Put blocks under the head of your bed frame or a wedge under your mattress to raise it.   Other changes  Lose weight, if you need to  Don’t exercise near bedtime  Don't wear tight-fitting clothes  Limit aspirin and ibuprofen  Stop smoking     [employer] last reviewed this educational content on 6/1/2019  © 3313-5811 [employer]. [address] [place] [address], [place] [address]. All rights reserved. This information is not intended as a substitute for professional medical care. Always follow your healthcare professional's instructions.

## 2024-11-21 NOTE — TELEPHONE ENCOUNTER
Schedulers- Patient was seen in office today, please call patient to schedule procedure per providers orders below. I reviewed and handed a copy of prep instructions with patient in office as well as medications. Patient is aware of different locations and our providers possibly booking out. No further questions asked.      Egd w/mac w/ gen pool  MD  Dx: #gerd  #epigastric burning  #stomach growling  #bloating     Hold phentermine 7 days prior

## 2024-11-22 NOTE — TELEPHONE ENCOUNTER
Scheduled for:  Esophagogastroduodenoscopy 27390    Provider Name:  Dr Chanel    Date:  12/24/2024     Location:    [facility]    Sedation:  MAC    Time:  1145 am (Patient made aware EM will call the day before with procedure/arrival time)    Prep:  NPO after midnight    Meds/Allergies Reconciled?:  Physician reviewed     Diagnosis with codes:    Gastroesophageal reflux disease, unspecified whether esophagitis present [K21.9]   Bloating [R14.0]   Stomach growling [R19.8]     Was patient informed to call insurance with codes (Y/N):  Yes, I confirmed Medicare insurance with the patient.     Referral sent?:  N/A    EM or [facility] notified?:  I sent an electronic request to Endo Scheduling and received a confirmation today.      Medication Orders:  This patient verbally confirmed that she is not taking:   Iron, blood thinners, BP meds, and is not diabetic   No latex allergy, No PCN allergy and does not have a pacemaker     Misc Orders:  Hold Phentermine 7 days prior     Further instructions given by staff:   I discussed the prep instructions with the patient which she verbally understood and is aware that I will send the instructions today via [employer].    Advised patient:    You will not be able to drive, operate machinery or make critical decisions the day of your procedure. Please make arrangements for transportation. You must have a  (age 18 or older) to accompany you, stay in the facility for the duration of your procedure and drive you home after the procedure.  You cannot use public transportation (Uber, Lyft, Taxi). The procedure involves sedation, and you will not be allowed to leave unaccompanied. Your procedure will not proceed forward if you're unable to confirm your  planned to escort you home.    Advised Patient:    [facility] requires payment of copay and any patient responsibility at the time of registration.   If you have any questions regarding your potential responsibility, please contact [facility] Insurance Department at [phone] option 1.    You may receive 4 bills related to your medical procedure:   [facility] (the facility)  The procedural physician  The anesthesiologist  The pathology lab (if applicable)

## 2024-11-26 ENCOUNTER — HOSPITAL ENCOUNTER (OUTPATIENT)
Dept: CT IMAGING | Facility: HOSPITAL | Age: 79
Discharge: HOME | End: 2024-11-26
Attending: NURSE PRACTITIONER
Payer: MEDICARE

## 2024-11-26 ENCOUNTER — OFFICE VISIT (OUTPATIENT)
Dept: PHYSICAL MEDICINE AND REHAB | Facility: CLINIC | Age: 79
End: 2024-11-26
Payer: MEDICARE

## 2024-11-26 ENCOUNTER — TELEPHONE (OUTPATIENT)
Dept: PHYSICAL MEDICINE AND REHAB | Facility: CLINIC | Age: 79
End: 2024-11-26

## 2024-11-26 DIAGNOSIS — R14.0 BLOATING: ICD-10-CM

## 2024-11-26 DIAGNOSIS — R10.13 EPIGASTRIC BURNING SENSATION: ICD-10-CM

## 2024-11-26 DIAGNOSIS — K21.9 GASTROESOPHAGEAL REFLUX DISEASE, UNSPECIFIED WHETHER ESOPHAGITIS PRESENT: ICD-10-CM

## 2024-11-26 DIAGNOSIS — Z12.11 COLON CANCER SCREENING: ICD-10-CM

## 2024-11-26 DIAGNOSIS — R19.8 STOMACH GROWLING: ICD-10-CM

## 2024-11-26 DIAGNOSIS — M48.062 SPINAL STENOSIS OF LUMBAR REGION WITH NEUROGENIC CLAUDICATION: Primary | ICD-10-CM

## 2024-11-26 PROCEDURE — 99214 OFFICE O/P EST MOD 30 MIN: CPT | Performed by: PHYSICAL MEDICINE & REHABILITATION

## 2024-11-26 PROCEDURE — 74177 CT ABD & PELVIS W/CONTRAST: CPT | Performed by: NURSE PRACTITIONER

## 2024-11-26 RX ORDER — GABAPENTIN 300 MG/1
CAPSULE ORAL
Qty: 540 CAPSULE | Refills: 0 | Status: SHIPPED | OUTPATIENT
Start: 2024-12-05

## 2024-11-26 NOTE — TELEPHONE ENCOUNTER
Per CMS Guidelines -no authorization is required for Caudal epidural steroid injection under fluoroscopy  CPT code: 18669  Per Dr. Loo:   local vs PO valium       Status: Authorization is not required based on medical necessity however is not a guarantee of payment and may be subject to review once claim is submitted-Covered Benefit.  This will be #2 in a rolling 12 month period

## 2024-11-26 NOTE — PROGRESS NOTES
Progress note    C/C:   Chief Complaint   Patient presents with    Follow - Up     /24. Patient presents for f/u on low back. Patient completed aqua therapy with some relief. Pain 0/10. Denies N/T. Denies weakness. No pain meds.       HPI: 79 year old female presents for follow up. Low back pain persists; able to walk from the front entrance to diagnostics main before needing to stop.  That being said after about 6-8 sessions of aquatic therapy has noticed improved ability to tolerate standing and walking.  Continues to have low back pain across the waistline and a little bit into the upper buttocks.  Has been taking gabapentin 600 mg 3 times daily without side effects.  She has also noticed less numbness and tingling into the left leg.  She stopped aquatic therapy as she recently had cataract surgery and was cleared to return if further aquatic therapy is recommended. She is also requesting repeat caudal JACQUELINE due to difficulty with back and upper buttock pain limiting her standing and walking.     Also recently saw Dr. Bonilla, underwent left GH joint steroid injection with improvement in shoulder pain and ROM. SYNVISC 1 injection gave mild relief.  He recommended a reverse total shoulder arthroplasty.    Pertinent allergies: Allergies[1]     Physical exam:  There were no vitals taken for this visit.     Lumbar spine exam:    No skin rash lumbar/upper sacral region  No pain with lumbar flexion. + pain with lumbar extension.  5/5 LE strength b/l  SILT b/l LE  2/4 quad, gastrocs reflexes b/l  SLR Negative b/l    Imaging: no new imaging to review    Assessment and plan  Lumbar stenosis with neurogenic claudication  S/p lumbar fusion  Left glenohumeral osteoarthritis  HTN  GERD    Recommend she resume aquatic therapy.  Continue current doses of gabapentin.  I also recommend repeating caudal epidural steroid injection under fluoroscopy, local with or without oral sedation. We discussed the risks of procedure,  including bleeding, infection, nerve injury, HA; elects to proceed.    Sher Loo DO  Physical Medicine and Rehabilitation  [facility]         [1] No Known Allergies

## 2024-11-27 NOTE — TELEPHONE ENCOUNTER
Patient has been scheduled for caudal steroid epidural injection on 12/9/24 at the [facility] with Dr. Loo.   Anesthesia type:  Oral sedation  Please note: The [facility] will call the business day prior to discuss the exact time/arrival and additional instructions for your appointment.  Patient was advised that if he/she does receive the covid vaccine it needs to be at least 2 weeks before or after the injection.  Medications and allergies reviewed.  Educated to hold NSAIDS (Aleve, Ibuprofen, Motrin, Advil) and anti-inflammatories (Meloxicam, Naproxen, Diclofenac, Celebrex) and for cervical injections must hold Multi-Vitamins, Vitamin E, Fish Oil/Omega-3.  Patient informed of [facility]'s  policy:  The patient will require transportation arrangements to and from the procedure, with the  present on site for the entire visit.  Without a , the appointment is subject to cancellation.    [facility] is located in the [facility] 1st floor [address] [place] [address].   may park in the yellow/purple parking lot.  Patient verbalized understanding and agrees with plan.  Scheduled in Epic: Yes  Scheduled in Surgical Case: Yes  Follow up appointment made: NOV: Visit date not found

## 2024-12-02 ENCOUNTER — TELEPHONE (OUTPATIENT)
Facility: CLINIC | Age: 79
End: 2024-12-02

## 2024-12-02 DIAGNOSIS — R19.7 DIARRHEA, UNSPECIFIED TYPE: Primary | ICD-10-CM

## 2024-12-02 NOTE — TELEPHONE ENCOUNTER
I contacted the pt -  She reports loose stools up to 3x daily since starting omeprazole. No brbpr, mucus. No abd pain. No fever/chills. No n/v. Has improved acid reflux.    She had ugi complaints while on lansoprazole once daily.    Advise:  Stop omeprazole   Increase lansoprazole to twice daily x 4 weeks and then attempt to reduce dose to once daily  Reflux diet modification  Stool testing    Contact office and/or consider er if condition decline    She verbalizes understanding and is in agreement with the plan

## 2024-12-02 NOTE — TELEPHONE ENCOUNTER
Kellie-    MIGUEL spoke to patient    She states that after starting the omeprazole 40 mg, she started having diarrhea daily (sometimes up to 3 times a day).    She denies abdominal pain, blood in stool, fever, N/V    Wondering if she should continue on the omeprazole    Please advise

## 2024-12-04 NOTE — LETTER
[address], [facility]   Date:   12/22/2022     Name:   Yakov Ahumada    YOB: 1945   MRN:   [id number]       WHERE IS YOUR PAIN NOW? Jazmine the areas on your body where you feel the described sensations. Use the appropriate symbol. Gilda Malin the areas of radiation. Include all affected areas. Just to complete the picture, please draw in the face. ACHE:  ^ ^ ^   NUMBNESS:  0000   PINS & NEEDLES:  = = = =                              ^ ^ ^                       0000              = = = =                                    ^ ^ ^                       0000            = = = =      BURNING:  XXXX   STABBING: ////                  XXXX                ////                         XXXX          ////     Please jazmine the line below indicating your degree of pain right now  with 0 being no pain 10 being the worst pain possible.                                          0             1             2              3             4              5              6              7             8             9             10         Patient Signature: ----- Message from Josefina Mead MD sent at 12/4/2024  1:03 PM CST -----  Please notify Alexis her labs are normal. Keep track if more weight loss. Tell her to have endocrine send me a copy of their consult.

## 2024-12-09 ENCOUNTER — MED REC SCAN ONLY (OUTPATIENT)
Dept: PHYSICAL MEDICINE AND REHAB | Facility: CLINIC | Age: 79
End: 2024-12-09

## 2024-12-20 ENCOUNTER — TELEPHONE (OUTPATIENT)
Facility: CLINIC | Age: 79
End: 2024-12-20

## 2024-12-26 DIAGNOSIS — I10 ESSENTIAL HYPERTENSION: ICD-10-CM

## 2024-12-29 RX ORDER — AMLODIPINE BESYLATE 5 MG/1
5 TABLET ORAL DAILY
Qty: 90 TABLET | Refills: 3 | Status: SHIPPED | OUTPATIENT
Start: 2024-12-29

## 2024-12-29 NOTE — TELEPHONE ENCOUNTER
Refill passed per [facility] protocol.    Requested Prescriptions   Pending Prescriptions Disp Refills    AMLODIPINE 5 MG Oral Tab [Pharmacy Med Name: Amlodipine Besylate 5 Mg Tab Unic] 90 tablet 0     Sig: Take 1 tablet (5 mg total) by mouth daily. **please address all refills at your upcoming appointment.       Hypertension Medications Protocol Passed - 12/29/2024 10:36 AM        Passed - CMP or BMP in past 12 months        Passed - Last BP reading less than 140/90     BP Readings from Last 1 Encounters:   12/09/24 106/46               Passed - In person appointment or virtual visit in the past 12 mos or appointment in next 3 mos     Recent Outpatient Visits              1 month ago Spinal stenosis of lumbar region with neurogenic claudication    [facility] Sher Loo DO    Office Visit    1 month ago Bloating    [facility] Kellie Claire APRN    Office Visit    1 month ago Glenohumeral arthritis, left    [facility] Mazin Bonilla MD    Office Visit    1 month ago Cigarette smoker    [facility] Estuardo Lopes DO    Office Visit    3 months ago Essential hypertension    [facility] Jose Elias Templeton MD    Office Visit          Future Appointments         Provider Department Appt Notes    In 2 months [facility]as [facility]     In 3 months REGINO, PROCEDURE [facility] EGD w/Mac @[facility]                    Passed - EGFRCR or GFRAA > 50     GFR Evaluation  EGFRCR: 69 , resulted on 11/11/2024               Future Appointments         Provider Department Appt Notes    In 2 months Jose Elias [facility]     In 3 months [facility]            Recent Outpatient Visits              1 month ago Spinal stenosis of lumbar region with neurogenic claudication    [facility], Sher Herrera,     Office Visit    1 month ago Bloating    [facility] Kellie Claire APRN    Office Visit    1 month ago Glenohumeral arthritis, left    [facility], Mazin Santos MD    Office Visit    1 month ago Cigarette smoker    [facility] Estuardo Lopes DO    Office Visit    3 months ago Essential hypertension    [facility], OvidJose Elias Ivey MD    Office Visit

## 2025-01-02 DIAGNOSIS — M48.062 SPINAL STENOSIS OF LUMBAR REGION WITH NEUROGENIC CLAUDICATION: ICD-10-CM

## 2025-01-02 RX ORDER — TRAMADOL HYDROCHLORIDE 50 MG/1
50 TABLET ORAL 2 TIMES DAILY PRN
Qty: 60 TABLET | Refills: 0 | Status: SHIPPED | OUTPATIENT
Start: 2025-01-02

## 2025-01-02 NOTE — TELEPHONE ENCOUNTER
Refill Request    Last GFR- 11/11/24 69- passed protocol    Medication request: traMADol 50 MG Oral Tab  Take 1 tablet (50 mg total) by mouth 2 (two) times daily as needed (severe pain).     LOV:11/26/2024 Sher Loo DO   Due back to clinic per last office note:  not mentioned  NOV: Visit date not found      [place]: 7/11/24 #60 - 30 day supply    Urine drug screen (if applicable): n/a  Pain contract: n/a    LOV plan (if weaning or changing medications): Not mentioned at last office visit.   Per 7/11/24 office visit: \"Has been taking tramadol 50mg intermittently. \"

## 2025-01-06 ENCOUNTER — MED REC SCAN ONLY (OUTPATIENT)
Dept: PHYSICAL MEDICINE AND REHAB | Facility: CLINIC | Age: 80
End: 2025-01-06

## 2025-01-15 ENCOUNTER — TELEPHONE (OUTPATIENT)
Facility: CLINIC | Age: 80
End: 2025-01-15

## 2025-01-15 NOTE — TELEPHONE ENCOUNTER
1st,overdue reminder letter mailed out to patient   Labs  order      C. diff toxigenic PCR (OPT)  Stool Culture W/ Shigatoxin

## 2025-01-30 ENCOUNTER — OFFICE VISIT (OUTPATIENT)
Dept: ORTHOPEDICS CLINIC | Facility: CLINIC | Age: 80
End: 2025-01-30
Payer: MEDICARE

## 2025-01-30 DIAGNOSIS — M19.012 GLENOHUMERAL ARTHRITIS, LEFT: Primary | ICD-10-CM

## 2025-01-30 PROCEDURE — 99214 OFFICE O/P EST MOD 30 MIN: CPT | Performed by: ORTHOPAEDIC SURGERY

## 2025-01-30 NOTE — PROGRESS NOTES
NURSING INTAKE COMMENTS:   Chief Complaint   Patient presents with    Follow - Up     Bilateral Shoulder pain, states cortisone only lasted a little while, 8/10 pain scale, would like to discuss next steps       HPI: This 79 year old female presents today with complaints of bilateral shoulder pain follow-up.  She had an injection in the left shoulder in November which helped only slightly.  She has been using topical Voltaren gel and Tylenol arthritis with minimal improvements.  No recent injury to either shoulder.    Past Medical History:    Arthritis    left shoulder     Back problem    Cervical disc disease    Colon polyp    Esophageal reflux    High blood pressure    High cholesterol    HTN (hypertension)    Obesity (BMI 30-39.9)    Osteoarthritis    Other and unspecified hyperlipidemia    Unspecified essential hypertension    Uterine fibroid     Past Surgical History:   Procedure Laterality Date      1972    Colonoscopy  2015    Colonoscopy      Hysterectomy      Ir epidural steriod injection      Lumbar spine fusion combined      Spine surgery procedure unlisted  2014    Lumbar microdiscectomy    Total abdominal hysterectomy      w/ BSO    Tubal ligation       Current Outpatient Medications   Medication Sig Dispense Refill    Lansoprazole 15 MG Oral Capsule Delayed Release Take 1 capsule (15 mg total) by mouth daily.      TRAMADOL 50 MG Oral Tab Take 1 tablet by mouth 2 (two) times daily as needed (severe pain). 60 tablet 0    amLODIPine 5 MG Oral Tab Take 1 tablet (5 mg total) by mouth daily. **please address all refills at your upcoming appointment. 90 tablet 3    gabapentin 300 MG Oral Cap Take 2 capsules  by mouth in the morning, at noon, and at bedtime. 540 capsule 0    METHOCARBAMOL 750 MG Oral Tab Take 1 tablet  by mouth Daily as needed (muscle tightness/spasms). 90 tablet 0    escitalopram 10 MG Oral Tab Take 1 tablet (10 mg total) by mouth daily.      Phentermine HCl 30 MG Oral  Cap Take 1 capsule (30 mg total) by mouth every morning. (Patient taking differently: Take 1 capsule (30 mg total) by mouth every morning. Last dose 12/19/24) 30 capsule 5    topiramate 25 MG Oral Tab Take 1 tablet (25 mg total) by mouth daily. 90 tablet 1    Norethindrone 0.35 MG Oral Tab Take 1 tablet (0.35 mg total) by mouth daily. 84 tablet 3    clindamycin 1 % External Lotion Apply 1 Application  topically 2 (two) times daily. 60 mL 0    hydroCHLOROthiazide 12.5 MG Oral Tab Take 1 tablet (12.5 mg total) by mouth daily. 90 tablet 3    levocetirizine 5 MG Oral Tab TAKE ONE TABLET BY MOUTH EVERY DAY AT BEDTIME 90 tablet 3    acyclovir 5 % External Ointment Apply 1 g topically as needed. 30 g 0    losartan 50 MG Oral Tab Take 1 tablet (50 mg total) by mouth daily. 90 tablet 3    fluticasone propionate 50 MCG/ACT Nasal Suspension SPRAY TWICE IN EACH NOSTRIL DAILY 3 each 3    clotrimazole-betamethasone 1-0.05 % External Cream Apply 1 Application topically 2 (two) times daily as needed. 45 g 3    NAPROXEN 500 MG Oral Tab TAKE ONE TABLET BY MOUTH TWICE DAILY AS NEEDED 60 tablet 0    diclofenac 1 % External Gel Apply 4 g topically 4 (four) times daily. 1 each 0    ASPIRIN EC LOW DOSE 81 MG Oral Tab EC TAKE 1 TABLET BY MOUTH DAILY. 30 tablet 3    simvastatin 20 MG Oral Tab Take 1 tablet (20 mg total) by mouth nightly.      hydrocortisone 1 % External Cream Apply 1 Application topically 2 (two) times daily. 30 g 0    Probiotic Product (ACIDOPHILUS PROBIOTIC BLEND OR) Take  by mouth 2 (two) times daily.      Multiple Vitamins-Minerals (MULTI-VITAMIN/MINERALS) Oral Tab Take 1 tablet by mouth daily.      Omeprazole 40 MG Oral Capsule Delayed Release Take 1 capsule (40 mg total) by mouth daily. Take 1 capsule by mouth daily before breakfast. (Patient not taking: Reported on 1/10/2025) 30 capsule 3     Allergies[1]  Family History   Problem Relation Age of Onset    Diabetes Father     Hypertension Mother     Lipids Mother      Heart Disorder Mother     Dementia Mother     Breast Cancer Sister         in her 30's.    Cancer Other         Uncles with lung cancer    Hypertension Sister        Social History     Occupational History    Occupation:      Comment: retired   Tobacco Use    Smoking status: Every Day     Current packs/day: 0.50     Average packs/day: 0.5 packs/day for 15.6 years (7.8 ttl pk-yrs)     Types: Cigarettes     Start date: 12/26/2017    Smokeless tobacco: Never    Tobacco comments:     currently every day smoker, 5-6 cigs/day   Vaping Use    Vaping status: Never Used   Substance and Sexual Activity    Alcohol use: Yes     Comment: On occasion    Drug use: Never    Sexual activity: Not on file        Review of Systems:  GENERAL: denies fevers, chills, night sweats, fatigue, unintentional weight loss/gain  SKIN: denies skin lesions, open sores, rash  HEENT:denies recent vision change, new nasal congestion,hearing loss, tinnitus, sore throat, headaches  RESPIRATORY: denies new shortness of breath, cough, asthma, wheezing  CARDIOVASCULAR: denies chest pain, leg cramps with exertion, palpitations, leg swelling  GI: denies abdominal pain, nausea, vomiting, diarrhea, constipation, hematochezia, worsening heartburn or stomach ulcers  : denies dysuria, hematuria, incontinence, increased frequency, urgency, difficulty urinating  MUSCULOSKELETAL: denies musculoskeletal complaints other than in HPI  NEURO: denies numbness, tingling, weakness, balance issues, dizziness, memory loss  PSYCHIATRIC: denies Hx of depression, anxiety, other psychiatric disorders  HEMATOLOGIC: denies blood clots, anemia, blood clotting disorders, blood transfusion  ENDOCRINE: denies autoimmune disease, thyroid issues, or diabetes  ALLERGY: denies asthma, seasonal allergies    Physical Examination:    There were no vitals taken for this visit.  Constitutional: appears well hydrated, alert and responsive, no acute distress noted  Extremities: Left  shoulder active forward flexion to 90 degrees with pain.  Neurological: Unchanged    Imaging:   Previous left shoulder x-rays were again reviewed.  They demonstrate advanced glenohumeral degenerative changes.  Glenohumeral orientation is normal.    Labs:  Lab Results   Component Value Date    WBC 7.7 11/11/2024    HGB 12.7 11/11/2024    .0 11/11/2024      Lab Results   Component Value Date    GLU 82 11/11/2024    BUN 12 11/11/2024    CREATSERUM 0.87 11/11/2024    GFR 55 (L) 04/18/2016    GFRNAA 57 (L) 07/28/2022    GFRAA 65 07/28/2022        Assessment and Plan:  Diagnoses and all orders for this visit:    Glenohumeral arthritis, left  -     MRI SHOULDER, LEFT (CPT=73221); Future        Assessment: Left shoulder glenohumeral osteoarthritis    Plan: Given her ongoing symptoms despite conservative care, I advised consideration of surgical reconstruction of the shoulder.  Given her age and significant overhead weakness, I think she would likely benefit most from a reverse total shoulder arthroplasty.  I ordered an MRI of the left shoulder evaluate the integrity of the rotator cuff and to assess the glenoid version.  Follow-up again after the MRI is completed.  She may elect for the surgery in the coming months.  She has a trip planned in the spring.    Follow Up: Return for follow-up visit after ordered tests completed.    SAADIA BARROW MD       [1] No Known Allergies

## 2025-02-03 RX ORDER — ESCITALOPRAM OXALATE 10 MG/1
10 TABLET ORAL DAILY
Qty: 90 TABLET | Refills: 0 | Status: SHIPPED | OUTPATIENT
Start: 2025-02-03

## 2025-02-04 ENCOUNTER — LAB ENCOUNTER (OUTPATIENT)
Dept: LAB | Facility: HOSPITAL | Age: 80
End: 2025-02-04
Attending: NURSE PRACTITIONER
Payer: MEDICARE

## 2025-02-04 DIAGNOSIS — R19.7 DIARRHEA, UNSPECIFIED TYPE: ICD-10-CM

## 2025-02-04 PROCEDURE — 87015 SPECIMEN INFECT AGNT CONCNTJ: CPT

## 2025-02-04 PROCEDURE — 87427 SHIGA-LIKE TOXIN AG IA: CPT

## 2025-02-04 PROCEDURE — 87046 STOOL CULTR AEROBIC BACT EA: CPT

## 2025-02-04 PROCEDURE — 87045 FECES CULTURE AEROBIC BACT: CPT

## 2025-02-07 ENCOUNTER — LAB ENCOUNTER (OUTPATIENT)
Dept: LAB | Facility: HOSPITAL | Age: 80
End: 2025-02-07
Attending: NURSE PRACTITIONER
Payer: MEDICARE

## 2025-02-07 DIAGNOSIS — R19.7 DIARRHEA, UNSPECIFIED TYPE: ICD-10-CM

## 2025-02-07 PROCEDURE — 87493 C DIFF AMPLIFIED PROBE: CPT

## 2025-02-08 LAB — C DIFF TOX B STL QL: NEGATIVE

## 2025-02-10 ENCOUNTER — NURSE TRIAGE (OUTPATIENT)
Dept: FAMILY MEDICINE CLINIC | Facility: CLINIC | Age: 80
End: 2025-02-10

## 2025-02-10 NOTE — TELEPHONE ENCOUNTER
Please reply to pool: EM RN TRIAGE  Action Requested: Summary for Provider     []  Critical Lab, Recommendations Needed  [] Need Additional Advice  [x]   FYI    []   Need Orders  [] Need Medications Sent to Pharmacy  []  Other     SUMMARY: Patient contacts clinic reporting tightness in her chest with activity.  Notices it when she is rushing.  She has occasional dizziness that does not occur with the chest symptoms.  Denies neck, jaw or arm pain. She has baseline tingling of fingers of both hands due to arthritis.  Denies nausea or vomiting.  The pain improves with rest.  She is not currently experiencing symptoms.  Acute visit scheduled 02/12.  To report any progression of symptoms prior.  Emergency room flags discussed.  She verbalized understanding and compliance.    Reason for call: Chest Pain  Onset: Data Unavailable                       Reason for Disposition   Chest pain(s) lasting a few seconds persists > 3 days    Protocols used: Chest Pain-A-[place]

## 2025-02-12 RX ORDER — LOSARTAN POTASSIUM 50 MG/1
50 TABLET ORAL DAILY
Qty: 90 TABLET | Refills: 3 | Status: ON HOLD | OUTPATIENT
Start: 2025-02-12

## 2025-02-12 NOTE — TELEPHONE ENCOUNTER
Refill passed per [facility] protocol.    Requested Prescriptions   Pending Prescriptions Disp Refills    LOSARTAN 50 MG Oral Tab [Pharmacy Med Name: Losartan Potassium 50 Mg Tab [facility]] 90 tablet 0     Sig: TAKE 1 TABLET BY MOUTH DAILY       Hypertension Medications Protocol Passed - 2/12/2025  1:43 PM        Passed - CMP or BMP in past 12 months        Passed - Last BP reading less than 140/90     BP Readings from Last 1 Encounters:   01/10/25 127/89               Passed - In person appointment or virtual visit in the past 12 mos or appointment in next 3 mos     Recent Outpatient Visits              [facility] week [facility]    Office Visit    2 months ago Spinal stenosis of lumbar region with neurogenic claudication    [facility] Sher Loo DO    Office Visit    2 months ago Bloating    [facility] Kellie Claire APRN    Office Visit    2 months ago [facility]    Office Visit    3 months ago Cigarette smoker    [facility] Estuardo Lopes,     Office Visit          Future Appointments         Provider Department Appt Notes    Tomorrow Mazin Bonilla MD [facility] Injection Left Shoulder    In 1 week Estuardo Lopes DO [facility] occasional chest discomfort/dizziness when walking long distances    In 3 weeks Jose Elias Templeton MD [facility]     In 1 month REGINO, PROCEDURE [facility] VON w/Romulo @[facility]                    Passed - EGFRCR or GFRAA > 50     GFR Evaluation  EGFRCR: 69 , resulted on 11/11/2024          Passed -  Medication is active on med list             Future Appointments         Provider Department Appt Notes    Tomorrow Mazin Bonilla MD [facility] Injection Left Shoulder    In 1 week Estuardo Lopes,  [facility] occasional chest discomfort/dizziness when walking long distances    In 3 weeks Jose Elias Templeton MD [facility]     In 1 month REGINO, LISA [facility] EGD w/Mac @[facility]            Recent Outpatient Visits              [facility]    Office Visit    2 months ago Spinal stenosis of lumbar region with neurogenic claudication    [facility]    Office Visit    2 months ago Bloating    [facility] Kellie Claire APRN    Office Visit    2 months [facility]    Office Visit    3 months ago Cigarette smoker    [facility] Estuardo Lopes,     Office Visit

## 2025-02-13 ENCOUNTER — OFFICE VISIT (OUTPATIENT)
Dept: ORTHOPEDICS CLINIC | Facility: CLINIC | Age: 80
End: 2025-02-13
Payer: MEDICARE

## 2025-02-13 VITALS — SYSTOLIC BLOOD PRESSURE: 96 MMHG | HEART RATE: 79 BPM | DIASTOLIC BLOOD PRESSURE: 53 MMHG

## 2025-02-13 DIAGNOSIS — M19.012 GLENOHUMERAL ARTHRITIS, LEFT: Primary | ICD-10-CM

## 2025-02-13 DIAGNOSIS — M54.2 NECK PAIN: ICD-10-CM

## 2025-02-13 PROCEDURE — 99214 OFFICE O/P EST MOD 30 MIN: CPT | Performed by: ORTHOPAEDIC SURGERY

## 2025-02-13 PROCEDURE — 20610 DRAIN/INJ JOINT/BURSA W/O US: CPT | Performed by: ORTHOPAEDIC SURGERY

## 2025-02-13 RX ORDER — TRIAMCINOLONE ACETONIDE 40 MG/ML
40 INJECTION, SUSPENSION INTRA-ARTICULAR; INTRAMUSCULAR ONCE
Status: COMPLETED | OUTPATIENT
Start: 2025-02-13 | End: 2025-02-13

## 2025-02-13 RX ADMIN — TRIAMCINOLONE ACETONIDE 40 MG: 40 INJECTION, SUSPENSION INTRA-ARTICULAR; INTRAMUSCULAR at 11:57:00

## 2025-02-13 NOTE — PROGRESS NOTES
NURSING INTAKE COMMENTS:   Chief Complaint   Patient presents with    Follow - Up     Left shoulder pain MRI, 2/10 pain scale       HPI: This 79 year old female presents today with complaints of left shoulder pain follow-up.  She reports ongoing pain mainly over the anterior and lateral shoulder and arm.  She had the MRI and is here for the results.  She reports new symptoms affecting the right shoulder and upper extremity.  She feels numbness in her hand and neck pain.  No recent injury or fall.    Past Medical History:    Arthritis    left shoulder     Back problem    Cervical disc disease    Colon polyp    Esophageal reflux    High blood pressure    High cholesterol    HTN (hypertension)    Obesity (BMI 30-39.9)    Osteoarthritis    Other and unspecified hyperlipidemia    Unspecified essential hypertension    Uterine fibroid     Past Surgical History:   Procedure Laterality Date      1972    Colonoscopy  2015    Colonoscopy      Hysterectomy      Ir epidural steriod injection      Lumbar spine fusion combined      Spine surgery procedure unlisted  2014    Lumbar microdiscectomy    Total abdominal hysterectomy      w/ BSO    Tubal ligation       Current Outpatient Medications   Medication Sig Dispense Refill    losartan 50 MG Oral Tab Take 1 tablet (50 mg total) by mouth daily. 90 tablet 3    ESCITALOPRAM 10 MG Oral Tab TAKE ONE TABLET BY MOUTH ONE TIME DAILY 90 tablet 0    Lansoprazole 15 MG Oral Capsule Delayed Release Take 1 capsule (15 mg total) by mouth daily.      TRAMADOL 50 MG Oral Tab Take 1 tablet by mouth 2 (two) times daily as needed (severe pain). 60 tablet 0    amLODIPine 5 MG Oral Tab Take 1 tablet (5 mg total) by mouth daily. **please address all refills at your upcoming appointment. 90 tablet 3    gabapentin 300 MG Oral Cap Take 2 capsules  by mouth in the morning, at noon, and at bedtime. 540 capsule 0    METHOCARBAMOL 750 MG Oral Tab Take 1 tablet  by mouth Daily as needed  (muscle tightness/spasms). 90 tablet 0    Phentermine HCl 30 MG Oral Cap Take 1 capsule (30 mg total) by mouth every morning. (Patient taking differently: Take 1 capsule (30 mg total) by mouth every morning. Last dose 12/19/24) 30 capsule 5    topiramate 25 MG Oral Tab Take 1 tablet (25 mg total) by mouth daily. 90 tablet 1    Norethindrone 0.35 MG Oral Tab Take 1 tablet (0.35 mg total) by mouth daily. 84 tablet 3    clindamycin 1 % External Lotion Apply 1 Application  topically 2 (two) times daily. 60 mL 0    hydroCHLOROthiazide 12.5 MG Oral Tab Take 1 tablet (12.5 mg total) by mouth daily. 90 tablet 3    levocetirizine 5 MG Oral Tab TAKE ONE TABLET BY MOUTH EVERY DAY AT BEDTIME 90 tablet 3    acyclovir 5 % External Ointment Apply 1 g topically as needed. 30 g 0    fluticasone propionate 50 MCG/ACT Nasal Suspension SPRAY TWICE IN EACH NOSTRIL DAILY 3 each 3    clotrimazole-betamethasone 1-0.05 % External Cream Apply 1 Application topically 2 (two) times daily as needed. 45 g 3    NAPROXEN 500 MG Oral Tab TAKE ONE TABLET BY MOUTH TWICE DAILY AS NEEDED 60 tablet 0    diclofenac 1 % External Gel Apply 4 g topically 4 (four) times daily. 1 each 0    ASPIRIN EC LOW DOSE 81 MG Oral Tab EC TAKE 1 TABLET BY MOUTH DAILY. 30 tablet 3    simvastatin 20 MG Oral Tab Take 1 tablet (20 mg total) by mouth nightly.      hydrocortisone 1 % External Cream Apply 1 Application topically 2 (two) times daily. 30 g 0    Probiotic Product (ACIDOPHILUS PROBIOTIC BLEND OR) Take  by mouth 2 (two) times daily.      Multiple Vitamins-Minerals (MULTI-VITAMIN/MINERALS) Oral Tab Take 1 tablet by mouth daily.      Omeprazole 40 MG Oral Capsule Delayed Release Take 1 capsule (40 mg total) by mouth daily. Take 1 capsule by mouth daily before breakfast. (Patient not taking: Reported on 1/10/2025) 30 capsule 3     Allergies[1]  Family History   Problem Relation Age of Onset    Diabetes Father     Hypertension Mother     Lipids Mother     Heart Disorder  Mother     Dementia Mother     Breast Cancer Sister         in her 30's.    Cancer Other         Uncles with lung cancer    Hypertension Sister        Social History     Occupational History    Occupation:      Comment: retired   Tobacco Use    Smoking status: Every Day     Current packs/day: 0.50     Average packs/day: 0.5 packs/day for 15.6 years (7.8 ttl pk-yrs)     Types: Cigarettes     Start date: 12/26/2017    Smokeless tobacco: Never    Tobacco comments:     currently every day smoker, 5-6 cigs/day   Vaping Use    Vaping status: Never Used   Substance and Sexual Activity    Alcohol use: Yes     Comment: On occasion    Drug use: Never    Sexual activity: Not on file        Review of Systems:  GENERAL: denies fevers, chills, night sweats, fatigue, unintentional weight loss/gain  SKIN: denies skin lesions, open sores, rash  HEENT:denies recent vision change, new nasal congestion,hearing loss, tinnitus, sore throat, headaches  RESPIRATORY: denies new shortness of breath, cough, asthma, wheezing  CARDIOVASCULAR: denies chest pain, leg cramps with exertion, palpitations, leg swelling  GI: denies abdominal pain, nausea, vomiting, diarrhea, constipation, hematochezia, worsening heartburn or stomach ulcers  : denies dysuria, hematuria, incontinence, increased frequency, urgency, difficulty urinating  MUSCULOSKELETAL: denies musculoskeletal complaints other than in HPI  NEURO: denies numbness, tingling, weakness, balance issues, dizziness, memory loss  PSYCHIATRIC: denies Hx of depression, anxiety, other psychiatric disorders  HEMATOLOGIC: denies blood clots, anemia, blood clotting disorders, blood transfusion  ENDOCRINE: denies autoimmune disease, thyroid issues, or diabetes  ALLERGY: denies asthma, seasonal allergies    Physical Examination:    There were no vitals taken for this visit.  Constitutional: appears well hydrated, alert and responsive, no acute distress noted  Extremities: Left shoulder  examination unchanged.  Active forward flexion 100 degrees.  Neurological: Unchanged    Imaging:   MRI left shoulder was reviewed.  The MRI shows apparent good integrity to the rotator cuff tendon.  There is significant degeneration of the glenohumeral joint with findings consistent with osteonecrosis of the humeral head.  No significant erosion of the glenoid.  The version appears within normal limits.  There is approximately 10 degrees of retroversion noted    Labs:  Lab Results   Component Value Date    WBC 7.7 11/11/2024    HGB 12.7 11/11/2024    .0 11/11/2024      Lab Results   Component Value Date    GLU 82 11/11/2024    BUN 12 11/11/2024    CREATSERUM 0.87 11/11/2024    GFR 55 (L) 04/18/2016    GFRNAA 57 (L) 07/28/2022    GFRAA 65 07/28/2022        Assessment and Plan:  Diagnoses and all orders for this visit:    Glenohumeral arthritis, left  -     arthrocentesis major joint  -     triamcinolone acetonide (Kenalog-40) 40 MG/ML injection 40 mg    Neck pain  -     [facility]iatry Ref[facility] - [facility])        Assessment: Left shoulder glenohumeral osteoarthritis, osteonecrosis.  Neck pain and right upper extremity numbness    Plan: I advised a physiatry evaluation for her neck pain.  As far as her shoulder is concerned, I advised consideration of shoulder reconstruction surgery.  Risks and benefits of reverse total shoulder arthroplasty were discussed.  Questions were answered.  No guarantees made.  I feel a reverse implant would likely work better than a traditional implant given her degree of weakness.  She requested an injection today.  The left glenohumeral joint was injected with 40 mg of Kenalog using an anterior approach.  Advised icing, oral anti-inflammatory use, activity modifications and home exercises.  Follow-up again as needed.  She will consider surgical treatment of the left shoulder in the spring.  Would not recommend any type of implant surgery until mid May or  later.    Follow Up: Return if symptoms worsen or fail to improve.    SAADIA BARROW MD       [1] No Known Allergies

## 2025-02-13 NOTE — PROGRESS NOTES
Per verbal order from Dr. Bonilla draw up 3ml of 0.5% Marcaine & 2ml 1% lidocaine and 1ml of Kenalog 40 for cortisone injection to left shoulder. Ivelisse Alcaraz    Patient provided education handout for cortisone injection.

## 2025-02-14 ENCOUNTER — TELEPHONE (OUTPATIENT)
Dept: ORTHOPEDICS CLINIC | Facility: CLINIC | Age: 80
End: 2025-02-14

## 2025-02-20 ENCOUNTER — TELEPHONE (OUTPATIENT)
Dept: ORTHOPEDICS CLINIC | Facility: CLINIC | Age: 80
End: 2025-02-20

## 2025-02-20 NOTE — TELEPHONE ENCOUNTER
I reviewed the MRI of the left shoulder and report.  Rotator cuff tendon appears intact.  There is evidence of tendinopathy with inflammatory change.  Glenoid version is within normal limits and measures 10 degrees of retroversion.  There are extensive degenerative changes.  No bony pathology otherwise noted.    I feel a reverse total shoulder arthroplasty would be appropriate for this patient with standard technique.  No special implant should be required.

## 2025-03-01 DIAGNOSIS — E66.9 OBESITY (BMI 30-39.9): ICD-10-CM

## 2025-03-03 RX ORDER — TOPIRAMATE 25 MG/1
25 TABLET, FILM COATED ORAL DAILY
Qty: 90 TABLET | Refills: 0 | Status: SHIPPED | OUTPATIENT
Start: 2025-03-03

## 2025-03-05 ENCOUNTER — OFFICE VISIT (OUTPATIENT)
Dept: SURGERY | Facility: CLINIC | Age: 80
End: 2025-03-05
Payer: MEDICARE

## 2025-03-05 VITALS
BODY MASS INDEX: 29.3 KG/M2 | OXYGEN SATURATION: 98 % | HEIGHT: 66 IN | HEART RATE: 90 BPM | DIASTOLIC BLOOD PRESSURE: 62 MMHG | SYSTOLIC BLOOD PRESSURE: 124 MMHG | WEIGHT: 182.31 LBS

## 2025-03-05 DIAGNOSIS — E66.9 OBESITY (BMI 30-39.9): ICD-10-CM

## 2025-03-05 DIAGNOSIS — I10 ESSENTIAL HYPERTENSION: Primary | ICD-10-CM

## 2025-03-05 DIAGNOSIS — Z51.81 ENCOUNTER FOR THERAPEUTIC DRUG MONITORING: ICD-10-CM

## 2025-03-05 DIAGNOSIS — F43.9 STRESS: ICD-10-CM

## 2025-03-05 PROCEDURE — 99214 OFFICE O/P EST MOD 30 MIN: CPT | Performed by: INTERNAL MEDICINE

## 2025-03-05 RX ORDER — AZITHROMYCIN 250 MG/1
TABLET, FILM COATED ORAL
Qty: 6 TABLET | Refills: 0 | Status: SHIPPED | OUTPATIENT
Start: 2025-03-05 | End: 2025-03-10

## 2025-03-05 RX ORDER — PHENTERMINE HYDROCHLORIDE 30 MG/1
30 CAPSULE ORAL EVERY MORNING
Qty: 30 CAPSULE | Refills: 5 | Status: SHIPPED | OUTPATIENT
Start: 2025-03-05

## 2025-03-05 RX ORDER — ESCITALOPRAM OXALATE 5 MG/1
5 TABLET ORAL DAILY
Qty: 90 TABLET | Refills: 1 | Status: SHIPPED | OUTPATIENT
Start: 2025-03-05 | End: 2025-06-03

## 2025-03-05 NOTE — PROGRESS NOTES
[facility], [facility], [place]  [address]  [place] [address]  Dept: [phone]       Patient:  Anna Dalton  :      1945  MRN:      [id number]    Chief Complaint:    Chief Complaint   Patient presents with    Follow - Up    Weight Management       SUBJECTIVE     History of Present Illness:  Sera is being seen today for a follow-up for non surgical weight loss.     Past Medical History:   Past Medical History:    Arthritis    left shoulder     Back problem    Cervical disc disease    Colon polyp    Esophageal reflux    High blood pressure    High cholesterol    HTN (hypertension)    Obesity (BMI 30-39.9)    Osteoarthritis    Other and unspecified hyperlipidemia    Unspecified essential hypertension    Uterine fibroid        Comorbidities:  Back pain-Improvement?  yes, Joint pain-Improvement?  yes, AURELIO-Improvement?  yes and Snoring-Improvement?  yes    OBJECTIVE     Vitals: /62 (BP Location: Left arm, Patient Position: Sitting, Cuff Size: large)   Pulse 90   Ht 5' 6\" (1.676 m)   Wt 182 lb 4.8 oz (82.7 kg)   SpO2 98%   BMI 29.42 kg/m²     Initial weight loss:-10   Total weight loss: -13   Start weight: 195    Wt Readings from Last 3 Encounters:   25 182 lb 4.8 oz (82.7 kg)   25 182 lb (82.6 kg)   25 190 lb (86.2 kg)       Patient Medications:    Current Outpatient Medications   Medication Sig Dispense Refill    TOPIRAMATE 25 MG Oral Tab TAKE ONE TABLET BY MOUTH ONE TIME DAILY 90 tablet 0    diazePAM 5 MG Oral Tab 1 tablet (5 mg total).      DULoxetine 20 MG Oral Cap DR Particles Take 1 capsule (20 mg total) by mouth daily.      lansoprazole 30 MG Oral Capsule Delayed Release Take 1 capsule (30 mg total) by mouth before breakfast.      losartan 50 MG Oral Tab Take 1 tablet (50 mg total) by mouth daily. 90 tablet 3    ESCITALOPRAM 10 MG Oral Tab TAKE ONE TABLET BY MOUTH ONE TIME DAILY 90 tablet 0     Lansoprazole 15 MG Oral Capsule Delayed Release Take 1 capsule (15 mg total) by mouth in the morning and 1 capsule (15 mg total) before bedtime.      TRAMADOL 50 MG Oral Tab Take 1 tablet by mouth 2 (two) times daily as needed (severe pain). 60 tablet 0    amLODIPine 5 MG Oral Tab Take 1 tablet (5 mg total) by mouth daily. **please address all refills at your upcoming appointment. 90 tablet 3    gabapentin 300 MG Oral Cap Take 2 capsules  by mouth in the morning, at noon, and at bedtime. 540 capsule 0    Omeprazole 40 MG Oral Capsule Delayed Release Take 1 capsule (40 mg total) by mouth daily. Take 1 capsule by mouth daily before breakfast. (Patient not taking: Reported on 1/10/2025) 30 capsule 3    METHOCARBAMOL 750 MG Oral Tab Take 1 tablet  by mouth Daily as needed (muscle tightness/spasms). 90 tablet 0    Phentermine HCl 30 MG Oral Cap Take 1 capsule (30 mg total) by mouth every morning. 30 capsule 5    Norethindrone 0.35 MG Oral Tab Take 1 tablet (0.35 mg total) by mouth daily. 84 tablet 3    clindamycin 1 % External Lotion Apply 1 Application  topically 2 (two) times daily. 60 mL 0    hydroCHLOROthiazide 12.5 MG Oral Tab Take 1 tablet (12.5 mg total) by mouth daily. 90 tablet 3    levocetirizine 5 MG Oral Tab TAKE ONE TABLET BY MOUTH EVERY DAY AT BEDTIME 90 tablet 3    acyclovir 5 % External Ointment Apply 1 g topically as needed. 30 g 0    fluticasone propionate 50 MCG/ACT Nasal Suspension SPRAY TWICE IN EACH NOSTRIL DAILY 3 each 3    clotrimazole-betamethasone 1-0.05 % External Cream Apply 1 Application topically 2 (two) times daily as needed. 45 g 3    NAPROXEN 500 MG Oral Tab TAKE ONE TABLET BY MOUTH TWICE DAILY AS NEEDED (Patient not taking: Reported on 2/25/2025) 60 tablet 0    diclofenac 1 % External Gel Apply 4 g topically 4 (four) times daily. 1 each 0    ASPIRIN EC LOW DOSE 81 MG Oral Tab EC TAKE 1 TABLET BY MOUTH DAILY. 30 tablet 3    simvastatin 20 MG Oral Tab Take 1 tablet (20 mg total) by mouth  nightly.      hydrocortisone 1 % External Cream Apply 1 Application topically 2 (two) times daily. 30 g 0    Probiotic Product (ACIDOPHILUS PROBIOTIC BLEND OR) Take  by mouth 2 (two) times daily.      Multiple Vitamins-Minerals (MULTI-VITAMIN/MINERALS) Oral Tab Take 1 tablet by mouth daily.       Allergies:  Patient has no known allergies.     Social History:    Social History     Socioeconomic History    Marital status:      Spouse name: Not on file    Number of children: 5    Years of education: Not on file    Highest education level: Not on file   Occupational History    Occupation:      Comment: retired   Tobacco Use    Smoking status: Every Day     Current packs/day: 0.50     Average packs/day: 0.5 packs/day for 15.7 years (7.8 ttl pk-yrs)     Types: Cigarettes     Start date: 12/26/2017    Smokeless tobacco: Never    Tobacco comments:     currently every day smoker, 5-6 cigs/day   Vaping Use    Vaping status: Never Used   Substance and Sexual Activity    Alcohol use: Yes     Comment: On occasion    Drug use: Never    Sexual activity: Not on file   Other Topics Concern     Service Not Asked    Blood Transfusions Not Asked    Caffeine Concern Yes     Comment: 2 cups tea/day    Occupational Exposure Not Asked    Hobby Hazards Not Asked    Sleep Concern Not Asked    Stress Concern Not Asked    Weight Concern Not Asked    Special Diet Not Asked    Back Care Not Asked    Exercise Yes     Comment: chair exercise class 1x/week, walks stairs    Bike Helmet Not Asked    Seat Belt Not Asked    Self-Exams Not Asked    Grew up on a farm Not Asked    History of tanning Not Asked    Outdoor occupation Not Asked    Breast feeding Not Asked    Reaction to local anesthetic No   Social History Narrative    The patient does not use an assistive device..      The patient does live in a home with stairs.     Social Drivers of Health     Food Insecurity: Not on file   Transportation Needs: Not on file    Stress: Not on file   Housing Stability: Not on file     Surgical History:    Past Surgical History:   Procedure Laterality Date      1972    Colonoscopy  2015    Colonoscopy      Hysterectomy      Ir epidural steriod injection      Lumbar spine fusion combined      Spine surgery procedure unlisted  2014    Lumbar microdiscectomy    Total abdominal hysterectomy      w/ BSO    Tubal ligation       Family History:    Family History   Problem Relation Age of Onset    Diabetes Father     Hypertension Mother     Lipids Mother     Heart Disorder Mother     Dementia Mother     Breast Cancer Sister         in her 30's.    Cancer Other         Uncles with lung cancer    Hypertension Sister        Food Journal  Reviewed and Discussed:       Patient has a Food Journal?: yes   Patient is reading nutrition labels?  yes  Average Caloric Intake:     Average CHO Intake: 130  Is patient exercising? yes  Type of exercise? ADL's  Limited due to back pain    Eating Habits  Patient states the following:  Eats 2 meal(s) per day  Length of time it takes to consume a meal:  20  # of snacks per day: 1 Type of snacks:  fruit  Amount of soda consumption per day:    Amount of water (in ounces) per day:  64  Drinking between meals only:  yes  Toughest challenge:  exercise    Nutritional Goals  Limit carbohydrates to 100 gms per day, Eat 100-200 calories within 1 hour of waking  and Eat 3-4 cups of fresh fruits or vegetables daily    Behavior Modifications Reviewed and Discussed  Eat breakfast, Eat 3 meals per day, Plan meals in advance, Read nutrition labels, Drink 64 oz of water per day, Maintain a daily food journal, No drinking 30 minutes before or after meals, Utlize portion control strategies to reduce calorie intake, Identify triggers for eating and manage cues and Eat slowly and take 20 to 30 minutes to complete each meal    Exercise Goals Reviewed and Discussed    Continue to ambulate    ROS:    Constitutional:  positive for fatigue  Respiratory: negative  Cardiovascular: negative  Gastrointestinal: negative  Musculoskeletal:positive for arthralgias and back pain  Neurological: negative  Behavioral/Psych: negative  Endocrine: negative  All other systems were reviewed and are negative    Physical Exam:   General appearance: alert, cooperative and mildly obese  Head: Normocephalic, without obvious abnormality, atraumatic  Eyes: conjunctivae/corneas clear. PERRL, EOM's intact. Fundi benign.  Lungs: clear to auscultation bilaterally  Heart: S1, S2 normal, no murmur, click, rub or gallop, regular rate and rhythm  Abdomen:  soft, obese, non tender  Extremities: extremities normal, atraumatic, no cyanosis or edema  Pulses: 2+ and symmetric    ASSESSMENT     HYPERTENSION:  The patient's blood pressure has been well controlled.  she has been checking it as instructed and has remained in relatively good control.      Encounter Diagnosis(ses):   Encounter Diagnoses   Name Primary?    Essential hypertension Yes    Stress     Encounter for therapeutic drug monitoring     Obesity (BMI 30-39.9)        PLAN     Patient is not interested in bariatric surgery. Patient desires to pursue traditional weight loss at this time.      HYPERTENSION: Blood pressure stable on the above medications. No interval change in antihypertensive medication.     DEGENERATIVE JOINT DISEASE: Of the knees is stable. Encourage upper body exercises if unable to perform weight-bearing exercises.      Goals for next month:  1. Keep a food log.  2. Drink 48-64 ounces of non-caloric beverages per day. No fruit juices or regular soda.  3. Increase activity-upper body exercises, walk 10 minutes per day.  4. Increase fruit and vegetable servings to 5-6 per day.      Tolerating topiramate. Elevated leptin noted.  Will refill    PHENTERMINE:  Tolerating well  EKG done    Follow up with PCP as scheduled     Lexapro for stress: refill at current dose      SLOW  BURNER    Diagnoses and all orders for this visit:    Essential hypertension    Stress    Encounter for therapeutic drug monitoring    Obesity (BMI 30-39.9)          Jose Elias Templeton MD

## 2025-03-08 ENCOUNTER — TELEPHONE (OUTPATIENT)
Dept: FAMILY MEDICINE CLINIC | Facility: CLINIC | Age: 80
End: 2025-03-08

## 2025-03-11 RX ORDER — MECOBALAMIN 5000 MCG
15 TABLET,DISINTEGRATING ORAL 2 TIMES DAILY
Refills: 0 | OUTPATIENT
Start: 2025-03-11

## 2025-03-11 NOTE — TELEPHONE ENCOUNTER
Per office visit with Gastroenterology, MARGARET Claire 11/21/24:  she is here today for f/U  Reflux and epigastric burning x last few mos. Sx at night.  Sx while on lansoprazole 30 mg/daily    -stop lansoprazole and start omeprazole   -use pepcid for breakthrough

## 2025-03-11 NOTE — TELEPHONE ENCOUNTER
I called Sera, she states she is taking lansoprazole 30 MG Oral Capsule Delayed Release and states she was advised that she should take it twice daily; she states she was told to discontinue the Omeprazole. She was advised to call Gastroenterology and request refill for Rx of Lansoprazole. Omeprazole states it was externally reported, Rx was discontinued in chart, per patient request--> see telephone encounter from 12/2/2024:    \"Advise:  Stop omeprazole   Increase lansoprazole to twice daily x 4 weeks and then attempt to reduce dose to once daily  Reflux diet modification  Stool testing\"    Lansoprazole remains in active list. She will call Gastroenterology department as advised, phone number provided. Patient verbalized understanding. No further questions or concerns at this time.

## 2025-03-11 NOTE — TELEPHONE ENCOUNTER
Dear nursing staff,    Please call patient and verify what PPI patient is taking.    Per our records, lansoprazole was discontinued by GI 11/21/24 and was started on omeprazole.     Patient reported no longer taking omeprazole 40 mg on 1/10/2025    Lansoprazole 15 mg is also a patient reported medication. Patient was only on 30 mg.       Medication Quantity Refills Start End   Omeprazole 40 MG Oral Capsule Delayed Release 30 capsule 3 11/21/2024 --   Sig:   Take 1 capsule (40 mg total) by mouth daily. Take 1 capsule by mouth daily before breakfast.     Patient not taking:   Reported on 1/10/2025     Route:   Oral     Order #:   [id number]

## 2025-03-12 ENCOUNTER — TELEPHONE (OUTPATIENT)
Facility: CLINIC | Age: 80
End: 2025-03-12

## 2025-03-12 RX ORDER — LANSOPRAZOLE 30 MG/1
30 CAPSULE, DELAYED RELEASE ORAL
Qty: 30 CAPSULE | Refills: 0 | Status: SHIPPED | OUTPATIENT
Start: 2025-03-12 | End: 2025-04-11

## 2025-03-12 NOTE — TELEPHONE ENCOUNTER
Lansoprazole 30 mg once daily sent e-scribe.  If symptoms return on daily dosing, she should call and we can increase back to twice daily, but would recommend she reschedule the egd she cancelled.     Thanks,  Kellie

## 2025-03-12 NOTE — TELEPHONE ENCOUNTER
Kellie,    I spoke to patient regarding refilling Lansoprazole 30mg. States she has been taking it twice daily, symptoms are controlled.     Your note back in December stated to take twice daily for 4 weeks then go down to once daily. Patient never reduced to daily but will try that now.    Can you please send a refill, thank you.

## 2025-03-12 NOTE — TELEPHONE ENCOUNTER
Patient calling states needs refill on medication. Please call.   ([facility] on file)   losartan 50 MG Oral Tab

## 2025-03-26 ENCOUNTER — TELEPHONE (OUTPATIENT)
Facility: CLINIC | Age: 80
End: 2025-03-26

## 2025-03-26 PROBLEM — R10.13 DYSPEPSIA: Status: ACTIVE | Noted: 2025-03-26

## 2025-03-26 PROBLEM — R13.10 DYSPHAGIA: Status: ACTIVE | Noted: 2025-03-26

## 2025-03-26 NOTE — TELEPHONE ENCOUNTER
GOOD AFTERNOON SIR!!!    I just had another meeting with Ms Dalton today where we completed a stomach endoscopy examination which was essentially normal.    She told me before the procedure that recently she is having chest symptoms, \"indigestion\" tightness with \"rushing.\"  That seemed to include walking and parking lots.  It kind of sounded like an exertional symptom.    Sounds like she is going for shoulder replacement surgery in mid May.    Do you think she could use a cardiac stress test before that?    - cb

## 2025-03-28 RX ORDER — LANSOPRAZOLE 30 MG/1
30 CAPSULE, DELAYED RELEASE ORAL
Qty: 90 CAPSULE | Refills: 3 | Status: SHIPPED | OUTPATIENT
Start: 2025-03-28

## 2025-03-29 NOTE — TELEPHONE ENCOUNTER
Well mariama mendoza Dr.!    She definitely needs a cardiology assessment and some form of a stress test.  I will make sure that she goes through cardiology for clearance.  Thank you for the update.    Estuardo

## 2025-04-01 ENCOUNTER — OFFICE VISIT (OUTPATIENT)
Dept: PHYSICAL MEDICINE AND REHAB | Facility: CLINIC | Age: 80
End: 2025-04-01
Payer: MEDICARE

## 2025-04-01 ENCOUNTER — TELEPHONE (OUTPATIENT)
Dept: PHYSICAL MEDICINE AND REHAB | Facility: CLINIC | Age: 80
End: 2025-04-01

## 2025-04-01 VITALS
OXYGEN SATURATION: 100 % | DIASTOLIC BLOOD PRESSURE: 68 MMHG | HEART RATE: 70 BPM | BODY MASS INDEX: 29.25 KG/M2 | WEIGHT: 182 LBS | SYSTOLIC BLOOD PRESSURE: 126 MMHG | HEIGHT: 66 IN

## 2025-04-01 DIAGNOSIS — M79.18 MYOFASCIAL PAIN: Primary | ICD-10-CM

## 2025-04-01 DIAGNOSIS — M48.062 SPINAL STENOSIS OF LUMBAR REGION WITH NEUROGENIC CLAUDICATION: ICD-10-CM

## 2025-04-01 PROCEDURE — 20552 NJX 1/MLT TRIGGER POINT 1/2: CPT | Performed by: PHYSICAL MEDICINE & REHABILITATION

## 2025-04-01 PROCEDURE — 99214 OFFICE O/P EST MOD 30 MIN: CPT | Performed by: PHYSICAL MEDICINE & REHABILITATION

## 2025-04-01 RX ORDER — LIDOCAINE HYDROCHLORIDE 10 MG/ML
1 INJECTION, SOLUTION INFILTRATION; PERINEURAL ONCE
Status: DISCONTINUED | OUTPATIENT
Start: 2025-04-01 | End: 2025-04-01

## 2025-04-01 RX ORDER — BUPIVACAINE HYDROCHLORIDE 2.5 MG/ML
5 INJECTION, SOLUTION INFILTRATION; PERINEURAL ONCE
Status: DISCONTINUED | OUTPATIENT
Start: 2025-04-01 | End: 2025-04-01

## 2025-04-01 RX ORDER — LIDOCAINE HYDROCHLORIDE 10 MG/ML
3 INJECTION, SOLUTION INFILTRATION; PERINEURAL ONCE
Status: COMPLETED | OUTPATIENT
Start: 2025-04-01 | End: 2025-04-01

## 2025-04-01 RX ORDER — BUPIVACAINE HYDROCHLORIDE 2.5 MG/ML
2 INJECTION, SOLUTION INFILTRATION; PERINEURAL ONCE
Status: COMPLETED | OUTPATIENT
Start: 2025-04-01 | End: 2025-04-01

## 2025-04-01 NOTE — PROGRESS NOTES
Progress note    C/C:   Chief Complaint   Patient presents with    Follow - Up     LOV 11/26/24. Pt is here for f/u caudal epidural steroid injection under fluoroscopy completed 12/9/24 and reports 75% relief that stopped about ~1 month ago. Pain is 4/10. Denies N/T. Admits weakness. Tylenol BID. MRI 2/28/25.       HPI: 79 year old female presents for follow up. Did reasonably well following caudal JACQUELINE under fluoroscopy on December 9th, 2024 until about the last month. Increasing back and left leg pain with standing and walking, improves upon sitting. She can walk from about the parking lot to the office at the [facility] before needing to sit; upon sitting has significant if not complete relief of back and left leg pain. There is intermittent numbness and tingling in the left foot, not every day. Left leg also begins to feel weak the more she stands and walks.     Has also been following with orthopedic surgeon Dr. Mazin Bonilla, and is scheduled for left shoulder arthroplasty on May 19.    Pertinent allergies: Allergies[1]     Physical exam:  /68 (BP Location: Right arm, Patient Position: Sitting, Cuff Size: large)   Pulse 70   Ht 66\"   Wt 182 lb (82.6 kg)   SpO2 100%   BMI 29.38 kg/m²      Ttp left quadratus lumborum and paraspinals  5/5 ankle DF, inversion bilaterally  SLR negative b/l  No pain with passive left hip flexion, internal rotation.     Imaging: No new imaging to review    Assessment and plan  Lumbar stenosis with neurogenic claudication  S/p lumbar fusion  Left glenohumeral osteoarthritis, scheduled for arthroplasty May 19th.   HTN  GERD    We discussed treatment options, which includes having her switch from gabapentin to pregabalin and see if that works better for her, left quadratus lumborum and lumbar paraspinal trigger point injections which may help with the back pain, not likely to help with the leg symptoms, or repeat caudal epidural steroid injection.  She elects to continue with her  current dose of gabapentin, and opts for left lumbar paraspinal and quadratus lumborum trigger point injections.  This was done today, see separate note for details.  If no benefit she is contact the clinic in 1 week and we will get her set up for repeat caudal epidural steroid injection under fluoroscopy.    We also reviewed her medication list; she is to be taking either duloxetine or lexapro, but not both. The lexapro is prescribed by another physician; the duloxetine was previously prescribed by me. She is not sure which one she is taking. She is going to double check to make sure which medication she is taking, and will update the medication list accordingly on next visit.     Status update in 1 week.     Sher Loo DO  Physical Medicine and Rehabilitation  [facility]       [1] No Known Allergies

## 2025-04-01 NOTE — PATIENT INSTRUCTIONS
Take either lexapro OR duloxetine, not both. The duloxetine I prescribed to you for the back and left leg symptoms in the past.     Please let me know how you are doing next Tuesday, either through telephone call or MyChart. If no better we can consider repeating caudal epidural steroid injection.

## 2025-04-01 NOTE — PROGRESS NOTES
The following individual(s) verbally consented to be recorded using ambient AI listening technology and understand that they can each withdraw their consent to this listening technology at any point by asking the clinician to turn off or pause the recording:    Patient name: Anna Dalton

## 2025-04-01 NOTE — PROCEDURES
Procedure note: Trigger point injections  Procedure Diagnosis: Myofascial pain    Summary of Procedure:   Risks and benefits of the procedure were discussed with the patient and consent was obtained. Trigger points were palpated and marked along the left quadratus lumborum and lumbar paraspinals for a total of 3 trigger points. Using betadine swabs, injection sites were cleaned in sterile fashion. Using a 27 gauge 1 1/2\" needle 3cc of 1% Lidocaine and 2cc of 0.25% Bupivicaine was injected into the trigger points with 1-1.5 ml into each trigger point. There was negative aspiration of heme and no paresthesias were elicited. Patient tolerated the procedure well.   Sher Loo DO  Physical Medicine and Rehabilitation  [facility]

## 2025-04-01 NOTE — TELEPHONE ENCOUNTER
Per CMS Guidelines -no authorization is required for Left quadratus lumborum and lumbar paraspinal trigger point injections.  CPT codes: 56618, 94035,   Completed in the office.       Status: Authorization is not required based on medical necessity however is not a guarantee of payment and may be subject to review once claim is submitted.

## 2025-04-15 DIAGNOSIS — M48.062 SPINAL STENOSIS OF LUMBAR REGION WITH NEUROGENIC CLAUDICATION: ICD-10-CM

## 2025-04-15 RX ORDER — HYDROCHLOROTHIAZIDE 12.5 MG/1
12.5 TABLET ORAL DAILY
Qty: 90 TABLET | Refills: 3 | Status: SHIPPED | OUTPATIENT
Start: 2025-04-15

## 2025-04-15 RX ORDER — GABAPENTIN 300 MG/1
CAPSULE ORAL
Qty: 540 CAPSULE | Refills: 0 | Status: SHIPPED | OUTPATIENT
Start: 2025-04-15

## 2025-04-15 NOTE — TELEPHONE ENCOUNTER
Refill Per Protocol     Requested Prescriptions   Pending Prescriptions Disp Refills    HYDROCHLOROTHIAZIDE 12.5 MG Oral Tab [Pharmacy Med Name: Hydrochlorothiazide 12.5 Mg Tab Acta] 90 tablet 0     Sig: TAKE ONE TABLET BY MOUTH ONE TIME DAILY       Hypertension Medications Protocol Passed - 4/15/2025  6:45 PM        Passed - CMP or BMP in past 12 months        Passed - Last BP reading less than 140/90     BP Readings from Last 1 Encounters:   04/01/25 126/68               Passed - In person appointment or virtual visit in the past 12 mos or appointment in next 3 mos     Recent Outpatient Visits              2 weeks ago [facility]ofascial pain    [facility]    Office Visit    1 month ago Essential hypertension    [facility] Jose Elias Templeton MD    Office Visit    1 month ago Cigarette smoker    [facility] Estuardo Lopes DO    Office Visit    [facility]    [facility] Visit    2 months [facility]    [facility] Visit          Future Appointments         Provider Department Appt Notes    In 6 days Estuardo Lopes DO [facility] pre-op/Surgery is 5/19/25    In 5 months Jose Elias Templeton MD [facility]                     Passed - EGFRCR or GFRAA > 50     GFR Evaluation  EGFRCR: 69 , resulted on 11/11/2024          Passed - Medication is active on med list

## 2025-04-15 NOTE — TELEPHONE ENCOUNTER
Refill Request    Medication request: gabapentin 300 MG Oral Cap.  Take 2 capsules  by mouth in the morning, at noon, and at bedtime.      LOV:4/1/2025 Sher Loo DO   Due back to clinic per last office note:  Return for status update in 1 week   NOV: Visit date not found      [place] 1/28/25 #540 (90 days)    Urine drug screen (if applicable): N/A  Pain contract: N/A    LOV plan (if weaning or changing medications): no changes mentioned

## 2025-04-21 ENCOUNTER — LAB ENCOUNTER (OUTPATIENT)
Dept: LAB | Age: 80
End: 2025-04-21
Attending: FAMILY MEDICINE
Payer: MEDICARE

## 2025-04-21 ENCOUNTER — OFFICE VISIT (OUTPATIENT)
Dept: FAMILY MEDICINE CLINIC | Facility: CLINIC | Age: 80
End: 2025-04-21
Payer: MEDICARE

## 2025-04-21 ENCOUNTER — HOSPITAL ENCOUNTER (OUTPATIENT)
Dept: GENERAL RADIOLOGY | Age: 80
Discharge: HOME | End: 2025-04-21
Attending: FAMILY MEDICINE
Payer: MEDICARE

## 2025-04-21 VITALS
SYSTOLIC BLOOD PRESSURE: 120 MMHG | OXYGEN SATURATION: 98 % | RESPIRATION RATE: 18 BRPM | TEMPERATURE: 98 F | HEART RATE: 83 BPM | WEIGHT: 180 LBS | DIASTOLIC BLOOD PRESSURE: 72 MMHG | BODY MASS INDEX: 29 KG/M2

## 2025-04-21 DIAGNOSIS — Z01.818 PRE-OP EXAMINATION: Primary | ICD-10-CM

## 2025-04-21 DIAGNOSIS — Z01.812 ENCOUNTER FOR PRE-OPERATIVE LABORATORY TESTING: ICD-10-CM

## 2025-04-21 DIAGNOSIS — G89.29 CHRONIC PAIN IN LEFT SHOULDER: ICD-10-CM

## 2025-04-21 DIAGNOSIS — M25.512 CHRONIC PAIN IN LEFT SHOULDER: ICD-10-CM

## 2025-04-21 DIAGNOSIS — M19.012 PRIMARY OSTEOARTHRITIS OF LEFT SHOULDER: ICD-10-CM

## 2025-04-21 DIAGNOSIS — M99.02 SOMATIC DYSFUNCTION OF THORACOLUMBAR REGION: ICD-10-CM

## 2025-04-21 DIAGNOSIS — I10 ESSENTIAL HYPERTENSION: ICD-10-CM

## 2025-04-21 LAB
APTT PPP: 28.8 SECONDS (ref 23–36)
BASOPHILS # BLD AUTO: 0.04 X10(3) UL (ref 0–0.2)
BASOPHILS NFR BLD AUTO: 0.6 %
BILIRUB UR QL: NEGATIVE
CLARITY UR: CLEAR
DEPRECATED RDW RBC AUTO: 44.5 FL (ref 35.1–46.3)
EOSINOPHIL # BLD AUTO: 0.1 X10(3) UL (ref 0–0.7)
EOSINOPHIL NFR BLD AUTO: 1.4 %
ERYTHROCYTE [DISTWIDTH] IN BLOOD BY AUTOMATED COUNT: 13.5 % (ref 11–15)
GLUCOSE UR-MCNC: NORMAL MG/DL
HCT VFR BLD AUTO: 37.8 % (ref 35–48)
HGB BLD-MCNC: 11.8 G/DL (ref 12–16)
HGB UR QL STRIP.AUTO: NEGATIVE
IMM GRANULOCYTES # BLD AUTO: 0.01 X10(3) UL (ref 0–1)
IMM GRANULOCYTES NFR BLD: 0.1 %
INR BLD: 1.11 (ref 0.8–1.2)
KETONES UR-MCNC: NEGATIVE MG/DL
LEUKOCYTE ESTERASE UR QL STRIP.AUTO: NEGATIVE
LYMPHOCYTES # BLD AUTO: 1.98 X10(3) UL (ref 1–4)
LYMPHOCYTES NFR BLD AUTO: 28.2 %
MCH RBC QN AUTO: 28 PG (ref 26–34)
MCHC RBC AUTO-ENTMCNC: 31.2 G/DL (ref 31–37)
MCV RBC AUTO: 89.8 FL (ref 80–100)
MONOCYTES # BLD AUTO: 0.72 X10(3) UL (ref 0.1–1)
MONOCYTES NFR BLD AUTO: 10.3 %
NEUTROPHILS # BLD AUTO: 4.16 X10 (3) UL (ref 1.5–7.7)
NEUTROPHILS # BLD AUTO: 4.16 X10(3) UL (ref 1.5–7.7)
NEUTROPHILS NFR BLD AUTO: 59.4 %
NITRITE UR QL STRIP.AUTO: NEGATIVE
PH UR: 6.5 [PH] (ref 5–8)
PLATELET # BLD AUTO: 223 10(3)UL (ref 150–450)
PROT UR-MCNC: NEGATIVE MG/DL
PROTHROMBIN TIME: 15 SECONDS (ref 11.6–14.8)
RBC # BLD AUTO: 4.21 X10(6)UL (ref 3.8–5.3)
SP GR UR STRIP: 1.01 (ref 1–1.03)
UROBILINOGEN UR STRIP-ACNC: NORMAL
WBC # BLD AUTO: 7 X10(3) UL (ref 4–11)

## 2025-04-21 PROCEDURE — 93000 ELECTROCARDIOGRAM COMPLETE: CPT | Performed by: FAMILY MEDICINE

## 2025-04-21 PROCEDURE — 71046 X-RAY EXAM CHEST 2 VIEWS: CPT | Performed by: FAMILY MEDICINE

## 2025-04-21 PROCEDURE — 99214 OFFICE O/P EST MOD 30 MIN: CPT | Performed by: FAMILY MEDICINE

## 2025-04-21 PROCEDURE — 36415 COLL VENOUS BLD VENIPUNCTURE: CPT

## 2025-04-21 PROCEDURE — 87081 CULTURE SCREEN ONLY: CPT

## 2025-04-21 PROCEDURE — 85730 THROMBOPLASTIN TIME PARTIAL: CPT

## 2025-04-21 PROCEDURE — 98925 OSTEOPATH MANJ 1-2 REGIONS: CPT | Performed by: FAMILY MEDICINE

## 2025-04-21 PROCEDURE — 85610 PROTHROMBIN TIME: CPT

## 2025-04-21 PROCEDURE — 81003 URINALYSIS AUTO W/O SCOPE: CPT

## 2025-04-21 PROCEDURE — 85025 COMPLETE CBC W/AUTO DIFF WBC: CPT

## 2025-04-21 NOTE — PROGRESS NOTES
Subjective:     Patient ID: Anna Dalton is a 79 year old female.    This patient is a well-established 79-year-old -American female who has been seen by orthopedic specialist regarding her advanced, primary osteoarthritis of the left shoulder.  The specialist has recommended total shoulder arthroplasty on the left.  The tentative surgical date is May 19, 2025.    Patient currently does not report or display any symptoms consistent with upper respiratory infection and/or systemic viral illness.    Specialist has also sent a list of tests that need to be performed and resulted prior to the shoulder replacement.    Addendum Wednesday, April 23, 2025:    All labs and EKG and chest x-ray and cultures have been reviewed and are all normal and negative.  The patient is officially medically cleared to proceed with her proposed shoulder replacement surgery.        History/Other:   Review of Systems  Current Medications[1]  Allergies:Allergies[2]    Past Medical History[3]   Past Surgical History[4]   Family History[5]   Social History: Short Social Hx on File[6]     Objective:   Vitals:    04/21/25 1529   BP: 120/72   Pulse:    Resp:    Temp:        Physical Exam  Constitutional:       General: Sera is not in acute distress.     Appearance: Normal appearance. Sera is not ill-appearing.   HENT:      Right Ear: Tympanic membrane normal.      Left Ear: Tympanic membrane normal.      Nose: Nose normal.      Mouth/Throat:      Mouth: Mucous membranes are moist.   Cardiovascular:      Rate and Rhythm: Normal rate and regular rhythm.      Heart sounds:      No gallop.   Pulmonary:      Effort: Pulmonary effort is normal.      Breath sounds: Normal breath sounds.   Abdominal:      General: Bowel sounds are normal.      Palpations: Abdomen is soft. There is no mass.   Musculoskeletal:      Left shoulder: Crepitus present. Decreased range of motion. Decreased strength.        Arms:       Comments: Region of  discomfort as depicted.   Neurological:      General: No focal deficit present.      Mental Status: Sera is alert and oriented to person, place, and time.   Psychiatric:         Mood and Affect: Mood normal.         Assessment & Plan:   1. Pre-op examination  Patient needs medical clearance to proceed with total left shoulder replacement.    Addendum Wednesday, April 23, 2025:    All labs and EKG and chest x-ray and cultures have been reviewed and are all normal and negative.  The patient is officially medically cleared to proceed with her proposed shoulder replacement surgery.    2. Encounter for pre-operative laboratory testing  The following tests have been ordered.  - CBC W Differential W Platelet [E]; Future  - PTT, Activated [E]; Future  - Prothrombin Time (PT) [E]; Future  - Urinalysis with Culture Reflex [E]; Future  - EKG In-Office [19406]  - XR CHEST AP/PA (1 VIEW) (CPT=71045); Future  - MSSA and MRSA Culture Screen; Future    3. Primary osteoarthritis of left shoulder  Advanced disease, thus the need for total arthroplasty.    4. Chronic pain in left shoulder  Secondary to #3.    5. Essential hypertension  Blood pressure measures to goal.  Compliance with medication emphasized and encouraged.    6. Somatic dysfunction of thoracolumbar region  Osteopathic manipulation performed in the thoracolumbar region with moderate release obtained on several levels.  - OSTEOPATHIC MANIP,1-2 BODY REGN      Orders Placed This Encounter   Procedures    CBC W Differential W Platelet [E]    PTT, Activated [E]    Prothrombin Time (PT) [E]    Urinalysis with Culture Reflex [E]    OSTEOPATHIC MANIP,1-2 BODY REGN    MSSA and MRSA Culture Screen       Meds This Visit:  Requested Prescriptions      No prescriptions requested or ordered in this encounter       Imaging & Referrals:  ELECTROCARDIOGRAM, COMPLETE  XR CHEST AP/PA (1 VIEW) (CPT=71045)     Patient Instructions   Preoperative testing orders per the orthopedic  specialist include CBC, CMP, PT/PTT, urinalysis with culture, MRSA and MSSA, EKG, chest x-ray.  Patient is nondiabetic therefore A1c is not warranted.  Patient's medical clearance for the tentatively scheduled left total shoulder arthroplasty pending the lab results and EKG and chest x-ray.  Osteopathic manipulation performed in two regions.    Return if symptoms worsen or fail to improve.         [1]   Current Outpatient Medications   Medication Sig Dispense Refill    hydroCHLOROthiazide 12.5 MG Oral Tab Take 1 tablet (12.5 mg total) by mouth daily. 90 tablet 3    GABAPENTIN 300 MG Oral Cap Take 2 capsules  by mouth in the morning, at noon, and at bedtime. 540 capsule 0    lansoprazole 30 MG Oral Capsule Delayed Release Take 1 capsule (30 mg total) by mouth before breakfast. 90 capsule 3    Phentermine HCl 30 MG Oral Cap Take 1 capsule (30 mg total) by mouth every morning. 30 capsule 5    escitalopram 5 MG Oral Tab Take 1 tablet (5 mg total) by mouth daily. 90 tablet 1    TOPIRAMATE 25 MG Oral Tab TAKE ONE TABLET BY MOUTH ONE TIME DAILY 90 tablet 0    diazePAM 5 MG Oral Tab 1 tablet (5 mg total).      DULoxetine 20 MG Oral Cap DR Particles Take 1 capsule (20 mg total) by mouth daily.      losartan 50 MG Oral Tab Take 1 tablet (50 mg total) by mouth daily. 90 tablet 3    TRAMADOL 50 MG Oral Tab Take 1 tablet by mouth 2 (two) times daily as needed (severe pain). 60 tablet 0    amLODIPine 5 MG Oral Tab Take 1 tablet (5 mg total) by mouth daily. **please address all refills at your upcoming appointment. 90 tablet 3    METHOCARBAMOL 750 MG Oral Tab Take 1 tablet  by mouth Daily as needed (muscle tightness/spasms). 90 tablet 0    Norethindrone 0.35 MG Oral Tab Take 1 tablet (0.35 mg total) by mouth daily. 84 tablet 3    clindamycin 1 % External Lotion Apply 1 Application  topically 2 (two) times daily. 60 mL 0    levocetirizine 5 MG Oral Tab TAKE ONE TABLET BY MOUTH EVERY DAY AT BEDTIME 90 tablet 3    acyclovir 5 %  External Ointment Apply 1 g topically as needed. 30 g 0    fluticasone propionate 50 MCG/ACT Nasal Suspension SPRAY TWICE IN EACH NOSTRIL DAILY 3 each 3    clotrimazole-betamethasone 1-0.05 % External Cream Apply 1 Application topically 2 (two) times daily as needed. 45 g 3    NAPROXEN 500 MG Oral Tab TAKE ONE TABLET BY MOUTH TWICE DAILY AS NEEDED (Patient not taking: Reported on 2025) 60 tablet 0    diclofenac 1 % External Gel Apply 4 g topically 4 (four) times daily. 1 each 0    ASPIRIN EC LOW DOSE 81 MG Oral Tab EC TAKE 1 TABLET BY MOUTH DAILY. 30 tablet 3    simvastatin 20 MG Oral Tab Take 1 tablet (20 mg total) by mouth nightly.      hydrocortisone 1 % External Cream Apply 1 Application topically 2 (two) times daily. 30 g 0    Probiotic Product (ACIDOPHILUS PROBIOTIC BLEND OR) Take  by mouth 2 (two) times daily.      Multiple Vitamins-Minerals (MULTI-VITAMIN/MINERALS) Oral Tab Take 1 tablet by mouth daily.     [2] No Known Allergies  [3]   Past Medical History:   Arthritis    left shoulder     Back problem    Cervical disc disease    Colon polyp    Esophageal reflux    High blood pressure    High cholesterol    HTN (hypertension)    Obesity (BMI 30-39.9)    Osteoarthritis    Other and unspecified hyperlipidemia    Unspecified essential hypertension    Uterine fibroid   [4]   Past Surgical History:  Procedure Laterality Date          Colonoscopy  2015    Colonoscopy      Hysterectomy      Ir epidural steriod injection      Lumbar spine fusion combined      Spine surgery procedure unlisted  2014    Lumbar microdiscectomy    Total abdominal hysterectomy      w/ BSO    Tubal ligation     [5]   Family History  Problem Relation Age of Onset    Diabetes Father     Hypertension Mother     Lipids Mother     Heart Disorder Mother     Dementia Mother     Breast Cancer Sister         in her 30's.    Cancer Other         Uncles with lung cancer    Hypertension Sister    [6]   Social  History  Socioeconomic History    Marital status:     Number of children: 5   Occupational History    Occupation:      Comment: retired   Tobacco Use    Smoking status: Every Day     Current packs/day: 0.50     Average packs/day: 0.5 packs/day for 15.8 years (7.9 ttl pk-yrs)     Types: Cigarettes     Start date: 12/26/2017    Smokeless tobacco: Never    Tobacco comments:     currently every day smoker, 5-6 cigs/day   Vaping Use    Vaping status: Never Used   Substance and Sexual Activity    Alcohol use: Yes     Comment: On occasion    Drug use: Never   Other Topics Concern    Caffeine Concern Yes     Comment: 2 cups tea/day    Exercise Yes     Comment: chair exercise class 1x/week, walks stairs    Reaction to local anesthetic No   Social History Narrative    The patient does not use an assistive device..      The patient does live in a home with stairs.

## 2025-04-21 NOTE — PROCEDURES
Multiple vertebral segments in thoracolumbar region misaligned. Manual osteopathic manipulative therapy performed and immediate relief obtained. Patient instantaneously improved.

## 2025-04-21 NOTE — PATIENT INSTRUCTIONS
Preoperative testing orders per the orthopedic specialist include CBC, CMP, PT/PTT, urinalysis with culture, MRSA and MSSA, EKG, chest x-ray.  Patient is nondiabetic therefore A1c is not warranted.  Patient's medical clearance for the tentatively scheduled left total shoulder arthroplasty pending the lab results and EKG and chest x-ray.  Osteopathic manipulation performed in two regions.

## 2025-04-23 LAB
ATRIAL RATE: 66 BPM
P AXIS: 63 DEGREES
P-R INTERVAL: 142 MS
Q-T INTERVAL: 408 MS
QRS DURATION: 70 MS
QTC CALCULATION (BEZET): 427 MS
R AXIS: 29 DEGREES
T AXIS: 35 DEGREES
VENTRICULAR RATE: 66 BPM

## 2025-05-13 ENCOUNTER — TELEPHONE (OUTPATIENT)
Dept: FAMILY MEDICINE CLINIC | Facility: CLINIC | Age: 80
End: 2025-05-13

## 2025-05-13 NOTE — TELEPHONE ENCOUNTER
Dr. Bonilla's office calling to request medical clearance, results, EKG, and chest x-ray be faxed to [phone].

## 2025-05-15 ENCOUNTER — LAB ENCOUNTER (OUTPATIENT)
Dept: LAB | Facility: HOSPITAL | Age: 80
End: 2025-05-15
Attending: ORTHOPAEDIC SURGERY
Payer: MEDICARE

## 2025-05-15 DIAGNOSIS — Z01.818 PREOP TESTING: ICD-10-CM

## 2025-05-15 LAB
ALBUMIN SERPL-MCNC: 4.7 G/DL (ref 3.2–4.8)
ALBUMIN/GLOB SERPL: 1.8 {RATIO} (ref 1–2)
ALP LIVER SERPL-CCNC: 87 U/L (ref 55–142)
ALT SERPL-CCNC: 18 U/L (ref 10–49)
ANION GAP SERPL CALC-SCNC: 9 MMOL/L (ref 0–18)
AST SERPL-CCNC: 31 U/L (ref ?–34)
BILIRUB SERPL-MCNC: 0.5 MG/DL (ref 0.2–1.1)
BUN BLD-MCNC: 11 MG/DL (ref 9–23)
BUN/CREAT SERPL: 12 (ref 10–20)
CALCIUM BLD-MCNC: 9.5 MG/DL (ref 8.7–10.4)
CHLORIDE SERPL-SCNC: 106 MMOL/L (ref 98–112)
CO2 SERPL-SCNC: 26 MMOL/L (ref 21–32)
CREAT BLD-MCNC: 0.92 MG/DL (ref 0.55–1.02)
EGFRCR SERPLBLD CKD-EPI 2021: 64 ML/MIN/1.73M2 (ref 60–?)
GLOBULIN PLAS-MCNC: 2.6 G/DL (ref 2–3.5)
GLUCOSE BLD-MCNC: 93 MG/DL (ref 70–99)
OSMOLALITY SERPL CALC.SUM OF ELEC: 291 MOSM/KG (ref 275–295)
POTASSIUM SERPL-SCNC: 4.3 MMOL/L (ref 3.5–5.1)
PROT SERPL-MCNC: 7.3 G/DL (ref 5.7–8.2)
SODIUM SERPL-SCNC: 141 MMOL/L (ref 136–145)

## 2025-05-15 PROCEDURE — 36415 COLL VENOUS BLD VENIPUNCTURE: CPT

## 2025-05-15 PROCEDURE — 80053 COMPREHEN METABOLIC PANEL: CPT

## 2025-05-15 RX ORDER — ACETAMINOPHEN 500 MG
500 TABLET ORAL EVERY 6 HOURS PRN
COMMUNITY

## 2025-05-15 RX ORDER — OXYCODONE HCL 10 MG/1
10 TABLET, FILM COATED, EXTENDED RELEASE ORAL EVERY 8 HOURS
Refills: 0 | Status: CANCELLED | OUTPATIENT
Start: 2025-05-19 | End: 2025-05-19

## 2025-05-15 RX ORDER — CELECOXIB 200 MG/1
200 CAPSULE ORAL
Status: CANCELLED | OUTPATIENT
Start: 2025-05-19

## 2025-05-15 NOTE — H&P
[facility]  part of [facility]    History & Physical    Anna Dalton Patient Status:  Outpatient in a Bed    1945 MRN [id number]   Location [facility] OPERATING ROOM Attending Mazin Bonilla MD   Hosp Day # 0 PCP Estuardo Lopes DO     Date:  5/15/2025  Date of Admission:  (Not on file)    History provided by:patient  Chief Complaint:   No chief complaint on file.    Left shoulder pain   HPI:   Anna Dalton is a(n) 80 year old female. Presents today with complaints of left shoulder pain follow-up.  She reports ongoing pain mainly over the anterior and lateral shoulder and arm.  She had the MRI of the left shoulder.  She reports new symptoms affecting the right shoulder and upper extremity.  She feels numbness in her hand and neck pain.  No recent injury or fall. She had right shoulder injected with cortisone in office in May.     PMHx: HTN, high cholesterol, obesity, cervical radiculopathy, dyspepsia, dysphagia  NKDA  Medications: Turmeric, hydrochlorothazide, gabapentin, phentermine, escitalopram, topiramate, losartan, tramadol, amlodipine, levocetirizine, lansoprazole, methocarbamol, norethindrone, ASA 81mg    History   Past Medical History[1]  Past Surgical History[2]  Family History[3]  Social History:  Short Social Hx on File[4]  Allergies/Medications:   Allergies: Allergies[5]  Prescriptions Prior to Admission[6]    Review of Systems:   Pertinent items are noted in HPI.    Physical Exam:   Vital Signs:  Height 5' 6\" (1.676 m), weight 180 lb (81.6 kg), not currently breastfeeding.     General appearance: alert, appears stated age and cooperative  Extremities: Cervical spine range of motion slightly restricted restricted in extension lateral bending.  Spurling sign negative.     Semination of left shoulder reveals no obvious atrophy or prominence.  She is tender at the anterior glenohumeral joint line.  Active forward flexion to 80 degrees with  pain.  Passive flexion 100 degrees.  Active and passive external rotation 40 degrees.  Passive internal Tatian 30 degrees with pain.  Abduction and external rotation motor strength 5 out of 5.  Belly press 4+ out of 5.  Neville and Neer impingement signs are positive.  Speed's Test equivocal.  No palpable instability with anterior posterior load-and-shift maneuver  Pulses: 2+ and symmetric  Neurologic: Alert and oriented X 3, normal strength and tone. Normal symmetric reflexes. Normal coordination and gait. Left hand light touch and pinprick sensory exam normal. Lateral shoulder light touch and pinprick sensory examination normal.  strength,wrist extension, biceps, triceps, and shoulder abduction strength 5 out of 5. Brittany sign negative. No obvious atrophy of the hand.     Cervical Papanicolaou to be done in MD's office    Results:     Lab Results   Component Value Date    WBC 7.0 04/21/2025    HGB 11.8 (L) 04/21/2025    HCT 37.8 04/21/2025    .0 04/21/2025    CREATSERUM 0.87 11/11/2024    BUN 12 11/11/2024     11/11/2024    K 3.7 11/11/2024     11/11/2024    CO2 27.0 11/11/2024    GLU 82 11/11/2024    CA 10.0 11/11/2024    ALB 4.4 11/11/2024    ALKPHO 102 11/11/2024    BILT 0.5 11/11/2024    TP 7.4 11/11/2024    AST 18 11/11/2024    ALT 16 11/11/2024    PTT 28.8 04/21/2025    INR 1.11 04/21/2025    TSH 0.876 11/11/2024    PHOS 3.2 02/21/2024    B12 1,770 (H) 05/13/2019       No results found.        Assessment/Plan:     * No active hospital problems. *    Assessment: Left shoulder glenohumeral osteoarthritis, osteonecrosis.  Neck pain and right upper extremity numbness     Plan: I advised a physiatry evaluation for her neck pain.  As far as her shoulder is concerned, I advised consideration of shoulder reconstruction surgery.  Risks and benefits of reverse total shoulder arthroplasty were discussed.  Questions were answered.  No guarantees made.  I feel a reverse implant would likely work  better than a traditional implant given her degree of weakness.  Advised icing, oral anti-inflammatory use, activity modifications and home exercises.  Follow-up on the date of the procedure.      Follow Up: In office one week after surgery for postoperative visit.          Mary Kay Atkins PA-C  5/15/2025         [1]   Past Medical History:   Arthritis    left shoulder     Back problem    Cervical disc disease    Colon polyp    Esophageal reflux    High blood pressure    High cholesterol    HTN (hypertension)    Obesity (BMI 30-39.9)    Osteoarthritis    Other and unspecified hyperlipidemia    Unspecified essential hypertension    Uterine fibroid    Visual impairment    wear glasses   [2]   Past Surgical History:  Procedure Laterality Date          Colonoscopy  2015    Colonoscopy      Hysterectomy      Ir epidural steriod injection      Lumbar spine fusion combined      Spine surgery procedure unlisted  2014    Lumbar microdiscectomy    Total abdominal hysterectomy      w/ BSO    Tubal ligation     [3]   Family History  Problem Relation Age of Onset    Diabetes Father     Hypertension Mother     Lipids Mother     Heart Disorder Mother     Dementia Mother     Breast Cancer Sister         in her 30's.    Cancer Other         Uncles with lung cancer    Hypertension Sister    [4]   Social History  Socioeconomic History    Marital status:     Number of children: 5   Occupational History    Occupation:      Comment: retired   Tobacco Use    Smoking status: Every Day     Current packs/day: 0.50     Average packs/day: 0.5 packs/day for 15.9 years (7.9 ttl pk-yrs)     Types: Cigarettes     Start date: 2017    Smokeless tobacco: Never    Tobacco comments:     currently every day smoker, 5-6 cigs/day   Vaping Use    Vaping status: Never Used   Substance and Sexual Activity    Alcohol use: Yes     Comment: occ    Drug use: Never   Other Topics Concern    Caffeine Concern Yes      Comment: 2 cups tea/day    Exercise Yes     Comment: chair exercise class 1x/week, walks stairs    Reaction to local anesthetic No   Social History Narrative    The patient does not use an assistive device..      The patient does live in a home with stairs.   [5] No Known Allergies  [6]   No medications prior to admission.

## 2025-05-19 ENCOUNTER — APPOINTMENT (OUTPATIENT)
Dept: GENERAL RADIOLOGY | Facility: HOSPITAL | Age: 80
End: 2025-05-19
Attending: ORTHOPAEDIC SURGERY
Payer: MEDICARE

## 2025-05-19 ENCOUNTER — HOSPITAL ENCOUNTER (OUTPATIENT)
Facility: HOSPITAL | Age: 80
Discharge: HOME | End: 2025-05-20
Attending: ORTHOPAEDIC SURGERY | Admitting: ORTHOPAEDIC SURGERY
Payer: MEDICARE

## 2025-05-19 ENCOUNTER — ANESTHESIA EVENT (OUTPATIENT)
Dept: SURGERY | Facility: HOSPITAL | Age: 80
End: 2025-05-19
Payer: MEDICARE

## 2025-05-19 ENCOUNTER — ANESTHESIA (OUTPATIENT)
Dept: SURGERY | Facility: HOSPITAL | Age: 80
End: 2025-05-19
Payer: MEDICARE

## 2025-05-19 DIAGNOSIS — Z01.818 PREOP TESTING: ICD-10-CM

## 2025-05-19 DIAGNOSIS — M19.012 GLENOHUMERAL ARTHRITIS, LEFT: Primary | ICD-10-CM

## 2025-05-19 PROCEDURE — 73030 X-RAY EXAM OF SHOULDER: CPT | Performed by: ORTHOPAEDIC SURGERY

## 2025-05-19 PROCEDURE — 99204 OFFICE O/P NEW MOD 45 MIN: CPT | Performed by: INTERNAL MEDICINE

## 2025-05-19 DEVICE — IMPLANTABLE DEVICE
Type: IMPLANTABLE DEVICE | Site: SHOULDER | Status: FUNCTIONAL
Brand: IDENTITY™

## 2025-05-19 DEVICE — IMPLANTABLE DEVICE
Type: IMPLANTABLE DEVICE | Site: SHOULDER | Status: FUNCTIONAL
Brand: COMPREHENSIVE® REVERSE SHOULDER

## 2025-05-19 DEVICE — IMPLANTABLE DEVICE
Type: IMPLANTABLE DEVICE | Site: SHOULDER | Status: FUNCTIONAL
Brand: COMPREHENSIVE REVERSE SHOULDER

## 2025-05-19 DEVICE — IMPLANTABLE DEVICE
Type: IMPLANTABLE DEVICE | Site: SHOULDER | Status: FUNCTIONAL
Brand: COMPREHENSIVE® VIVACIT-E®

## 2025-05-19 RX ORDER — ACETAMINOPHEN AND CODEINE PHOSPHATE 120; 12 MG/5ML; MG/5ML
0.35 SOLUTION ORAL NIGHTLY
Status: DISCONTINUED | OUTPATIENT
Start: 2025-05-19 | End: 2025-05-20

## 2025-05-19 RX ORDER — LOSARTAN POTASSIUM 50 MG/1
50 TABLET ORAL DAILY
Status: DISCONTINUED | OUTPATIENT
Start: 2025-05-19 | End: 2025-05-20

## 2025-05-19 RX ORDER — GABAPENTIN 300 MG/1
300 CAPSULE ORAL 3 TIMES DAILY
Status: DISCONTINUED | OUTPATIENT
Start: 2025-05-19 | End: 2025-05-20

## 2025-05-19 RX ORDER — FLUTICASONE PROPIONATE 50 MCG
2 SPRAY, SUSPENSION (ML) NASAL DAILY
Status: DISCONTINUED | OUTPATIENT
Start: 2025-05-19 | End: 2025-05-20

## 2025-05-19 RX ORDER — HYDROMORPHONE HYDROCHLORIDE 1 MG/ML
0.2 INJECTION, SOLUTION INTRAMUSCULAR; INTRAVENOUS; SUBCUTANEOUS EVERY 5 MIN PRN
Status: DISCONTINUED | OUTPATIENT
Start: 2025-05-19 | End: 2025-05-19 | Stop reason: HOSPADM

## 2025-05-19 RX ORDER — METOCLOPRAMIDE HYDROCHLORIDE 5 MG/ML
5 INJECTION INTRAMUSCULAR; INTRAVENOUS EVERY 8 HOURS PRN
Status: DISCONTINUED | OUTPATIENT
Start: 2025-05-19 | End: 2025-05-20

## 2025-05-19 RX ORDER — POLYETHYLENE GLYCOL 3350 17 G/17G
17 POWDER, FOR SOLUTION ORAL DAILY PRN
Qty: 24 EACH | Refills: 0 | Status: SHIPPED | OUTPATIENT
Start: 2025-05-19

## 2025-05-19 RX ORDER — SODIUM CHLORIDE, SODIUM LACTATE, POTASSIUM CHLORIDE, CALCIUM CHLORIDE 600; 310; 30; 20 MG/100ML; MG/100ML; MG/100ML; MG/100ML
INJECTION, SOLUTION INTRAVENOUS CONTINUOUS
Status: DISCONTINUED | OUTPATIENT
Start: 2025-05-19 | End: 2025-05-20

## 2025-05-19 RX ORDER — CELECOXIB 200 MG/1
200 CAPSULE ORAL DAILY
Qty: 30 CAPSULE | Refills: 0 | Status: SHIPPED | OUTPATIENT
Start: 2025-05-19

## 2025-05-19 RX ORDER — MORPHINE SULFATE 4 MG/ML
4 INJECTION, SOLUTION INTRAMUSCULAR; INTRAVENOUS EVERY 10 MIN PRN
Status: DISCONTINUED | OUTPATIENT
Start: 2025-05-19 | End: 2025-05-19 | Stop reason: HOSPADM

## 2025-05-19 RX ORDER — OXYCODONE HCL 10 MG/1
10 TABLET, FILM COATED, EXTENDED RELEASE ORAL EVERY 8 HOURS
Status: COMPLETED | OUTPATIENT
Start: 2025-05-19 | End: 2025-05-19

## 2025-05-19 RX ORDER — ROCURONIUM BROMIDE 10 MG/ML
INJECTION, SOLUTION INTRAVENOUS AS NEEDED
Status: DISCONTINUED | OUTPATIENT
Start: 2025-05-19 | End: 2025-05-19 | Stop reason: SURG

## 2025-05-19 RX ORDER — DOCUSATE SODIUM 100 MG/1
100 CAPSULE, LIQUID FILLED ORAL 2 TIMES DAILY
Status: DISCONTINUED | OUTPATIENT
Start: 2025-05-19 | End: 2025-05-20

## 2025-05-19 RX ORDER — SENNOSIDES 8.6 MG
17.2 TABLET ORAL NIGHTLY
Status: DISCONTINUED | OUTPATIENT
Start: 2025-05-19 | End: 2025-05-20

## 2025-05-19 RX ORDER — EPHEDRINE SULFATE 50 MG/ML
INJECTION, SOLUTION INTRAVENOUS AS NEEDED
Status: DISCONTINUED | OUTPATIENT
Start: 2025-05-19 | End: 2025-05-19 | Stop reason: SURG

## 2025-05-19 RX ORDER — MORPHINE SULFATE 10 MG/ML
6 INJECTION, SOLUTION INTRAMUSCULAR; INTRAVENOUS EVERY 10 MIN PRN
Status: DISCONTINUED | OUTPATIENT
Start: 2025-05-19 | End: 2025-05-19 | Stop reason: HOSPADM

## 2025-05-19 RX ORDER — ASPIRIN 81 MG/1
81 TABLET ORAL 2 TIMES DAILY
Status: DISCONTINUED | OUTPATIENT
Start: 2025-05-19 | End: 2025-05-20

## 2025-05-19 RX ORDER — NALOXONE HYDROCHLORIDE 0.4 MG/ML
80 INJECTION, SOLUTION INTRAMUSCULAR; INTRAVENOUS; SUBCUTANEOUS AS NEEDED
Status: DISCONTINUED | OUTPATIENT
Start: 2025-05-19 | End: 2025-05-19 | Stop reason: HOSPADM

## 2025-05-19 RX ORDER — METOCLOPRAMIDE HYDROCHLORIDE 5 MG/ML
5 INJECTION INTRAMUSCULAR; INTRAVENOUS EVERY 8 HOURS PRN
Status: DISCONTINUED | OUTPATIENT
Start: 2025-05-19 | End: 2025-05-19 | Stop reason: HOSPADM

## 2025-05-19 RX ORDER — ONDANSETRON 2 MG/ML
INJECTION INTRAMUSCULAR; INTRAVENOUS AS NEEDED
Status: DISCONTINUED | OUTPATIENT
Start: 2025-05-19 | End: 2025-05-19 | Stop reason: SURG

## 2025-05-19 RX ORDER — ONDANSETRON 2 MG/ML
4 INJECTION INTRAMUSCULAR; INTRAVENOUS EVERY 6 HOURS PRN
Status: DISCONTINUED | OUTPATIENT
Start: 2025-05-19 | End: 2025-05-19 | Stop reason: HOSPADM

## 2025-05-19 RX ORDER — TRANEXAMIC ACID 10 MG/ML
INJECTION, SOLUTION INTRAVENOUS AS NEEDED
Status: DISCONTINUED | OUTPATIENT
Start: 2025-05-19 | End: 2025-05-19 | Stop reason: SURG

## 2025-05-19 RX ORDER — OXYCODONE HCL 10 MG/1
10 TABLET, FILM COATED, EXTENDED RELEASE ORAL EVERY 12 HOURS
Status: DISCONTINUED | OUTPATIENT
Start: 2025-05-19 | End: 2025-05-20

## 2025-05-19 RX ORDER — DEXAMETHASONE SODIUM PHOSPHATE 4 MG/ML
VIAL (ML) INJECTION AS NEEDED
Status: DISCONTINUED | OUTPATIENT
Start: 2025-05-19 | End: 2025-05-19 | Stop reason: SURG

## 2025-05-19 RX ORDER — ESCITALOPRAM OXALATE 5 MG/1
5 TABLET ORAL DAILY
Status: DISCONTINUED | OUTPATIENT
Start: 2025-05-19 | End: 2025-05-19 | Stop reason: SDUPTHER

## 2025-05-19 RX ORDER — ROPIVACAINE HYDROCHLORIDE 5 MG/ML
INJECTION, SOLUTION EPIDURAL; INFILTRATION; PERINEURAL
Status: COMPLETED | OUTPATIENT
Start: 2025-05-19 | End: 2025-05-19

## 2025-05-19 RX ORDER — HYDROCODONE BITARTRATE AND ACETAMINOPHEN 7.5; 325 MG/1; MG/1
1 TABLET ORAL EVERY 4 HOURS PRN
Status: DISCONTINUED | OUTPATIENT
Start: 2025-05-19 | End: 2025-05-20

## 2025-05-19 RX ORDER — HYDROMORPHONE HYDROCHLORIDE 1 MG/ML
0.4 INJECTION, SOLUTION INTRAMUSCULAR; INTRAVENOUS; SUBCUTANEOUS EVERY 5 MIN PRN
Status: DISCONTINUED | OUTPATIENT
Start: 2025-05-19 | End: 2025-05-19 | Stop reason: HOSPADM

## 2025-05-19 RX ORDER — ACETAMINOPHEN 500 MG
1000 TABLET ORAL ONCE
Status: DISCONTINUED | OUTPATIENT
Start: 2025-05-19 | End: 2025-05-19 | Stop reason: HOSPADM

## 2025-05-19 RX ORDER — TOPIRAMATE 25 MG/1
25 TABLET, FILM COATED ORAL DAILY
Status: DISCONTINUED | OUTPATIENT
Start: 2025-05-19 | End: 2025-05-20

## 2025-05-19 RX ORDER — SODIUM CHLORIDE, SODIUM LACTATE, POTASSIUM CHLORIDE, CALCIUM CHLORIDE 600; 310; 30; 20 MG/100ML; MG/100ML; MG/100ML; MG/100ML
INJECTION, SOLUTION INTRAVENOUS CONTINUOUS
Status: DISCONTINUED | OUTPATIENT
Start: 2025-05-19 | End: 2025-05-19 | Stop reason: HOSPADM

## 2025-05-19 RX ORDER — ATORVASTATIN CALCIUM 10 MG/1
10 TABLET, FILM COATED ORAL NIGHTLY
Status: DISCONTINUED | OUTPATIENT
Start: 2025-05-19 | End: 2025-05-20

## 2025-05-19 RX ORDER — MORPHINE SULFATE 4 MG/ML
2 INJECTION, SOLUTION INTRAMUSCULAR; INTRAVENOUS EVERY 10 MIN PRN
Status: DISCONTINUED | OUTPATIENT
Start: 2025-05-19 | End: 2025-05-19 | Stop reason: HOSPADM

## 2025-05-19 RX ORDER — HYDROCODONE BITARTRATE AND ACETAMINOPHEN 7.5; 325 MG/1; MG/1
1 TABLET ORAL EVERY 6 HOURS PRN
Qty: 15 TABLET | Refills: 0 | Status: SHIPPED | OUTPATIENT
Start: 2025-05-19

## 2025-05-19 RX ORDER — HYDROCHLOROTHIAZIDE 12.5 MG/1
12.5 TABLET ORAL DAILY
Status: DISCONTINUED | OUTPATIENT
Start: 2025-05-20 | End: 2025-05-20

## 2025-05-19 RX ORDER — CELECOXIB 200 MG/1
200 CAPSULE ORAL
Status: COMPLETED | OUTPATIENT
Start: 2025-05-19 | End: 2025-05-19

## 2025-05-19 RX ORDER — HYDROMORPHONE HYDROCHLORIDE 1 MG/ML
0.6 INJECTION, SOLUTION INTRAMUSCULAR; INTRAVENOUS; SUBCUTANEOUS EVERY 5 MIN PRN
Status: DISCONTINUED | OUTPATIENT
Start: 2025-05-19 | End: 2025-05-19 | Stop reason: HOSPADM

## 2025-05-19 RX ORDER — POLYETHYLENE GLYCOL 3350 17 G/17G
17 POWDER, FOR SOLUTION ORAL DAILY PRN
Status: DISCONTINUED | OUTPATIENT
Start: 2025-05-19 | End: 2025-05-20

## 2025-05-19 RX ORDER — ONDANSETRON 2 MG/ML
4 INJECTION INTRAMUSCULAR; INTRAVENOUS EVERY 6 HOURS PRN
Status: DISCONTINUED | OUTPATIENT
Start: 2025-05-19 | End: 2025-05-20

## 2025-05-19 RX ORDER — DIPHENHYDRAMINE HYDROCHLORIDE 50 MG/ML
25 INJECTION, SOLUTION INTRAMUSCULAR; INTRAVENOUS ONCE AS NEEDED
Status: ACTIVE | OUTPATIENT
Start: 2025-05-19 | End: 2025-05-19

## 2025-05-19 RX ORDER — BISACODYL 10 MG
10 SUPPOSITORY, RECTAL RECTAL
Status: DISCONTINUED | OUTPATIENT
Start: 2025-05-19 | End: 2025-05-20

## 2025-05-19 RX ORDER — SODIUM PHOSPHATE, DIBASIC AND SODIUM PHOSPHATE, MONOBASIC 7; 19 G/230ML; G/230ML
1 ENEMA RECTAL ONCE AS NEEDED
Status: DISCONTINUED | OUTPATIENT
Start: 2025-05-19 | End: 2025-05-20

## 2025-05-19 RX ORDER — ESCITALOPRAM OXALATE 5 MG/1
5 TABLET ORAL DAILY
Status: DISCONTINUED | OUTPATIENT
Start: 2025-05-20 | End: 2025-05-20

## 2025-05-19 RX ORDER — CELECOXIB 200 MG/1
200 CAPSULE ORAL 2 TIMES DAILY
Status: DISCONTINUED | OUTPATIENT
Start: 2025-05-19 | End: 2025-05-20

## 2025-05-19 RX ORDER — AMLODIPINE BESYLATE 5 MG/1
5 TABLET ORAL DAILY
Status: DISCONTINUED | OUTPATIENT
Start: 2025-05-20 | End: 2025-05-20

## 2025-05-19 RX ORDER — LIDOCAINE HYDROCHLORIDE 10 MG/ML
INJECTION, SOLUTION EPIDURAL; INFILTRATION; INTRACAUDAL; PERINEURAL AS NEEDED
Status: DISCONTINUED | OUTPATIENT
Start: 2025-05-19 | End: 2025-05-19 | Stop reason: SURG

## 2025-05-19 RX ORDER — PANTOPRAZOLE SODIUM 40 MG/1
40 TABLET, DELAYED RELEASE ORAL
Status: DISCONTINUED | OUTPATIENT
Start: 2025-05-20 | End: 2025-05-20

## 2025-05-19 RX ORDER — MORPHINE SULFATE 2 MG/ML
1 INJECTION, SOLUTION INTRAMUSCULAR; INTRAVENOUS EVERY 4 HOURS PRN
Status: DISCONTINUED | OUTPATIENT
Start: 2025-05-19 | End: 2025-05-20

## 2025-05-19 RX ADMIN — SODIUM CHLORIDE, SODIUM LACTATE, POTASSIUM CHLORIDE, CALCIUM CHLORIDE: 600; 310; 30; 20 INJECTION, SOLUTION INTRAVENOUS at 07:30:00

## 2025-05-19 RX ADMIN — DEXAMETHASONE SODIUM PHOSPHATE 4 MG: 4 MG/ML VIAL (ML) INJECTION at 07:49:00

## 2025-05-19 RX ADMIN — EPHEDRINE SULFATE 10 MG: 50 INJECTION, SOLUTION INTRAVENOUS at 07:43:00

## 2025-05-19 RX ADMIN — LIDOCAINE HYDROCHLORIDE 50 MG: 10 INJECTION, SOLUTION EPIDURAL; INFILTRATION; INTRACAUDAL; PERINEURAL at 07:42:00

## 2025-05-19 RX ADMIN — TRANEXAMIC ACID 1000 MG: 10 INJECTION, SOLUTION INTRAVENOUS at 07:53:00

## 2025-05-19 RX ADMIN — SODIUM CHLORIDE, SODIUM LACTATE, POTASSIUM CHLORIDE, CALCIUM CHLORIDE: 600; 310; 30; 20 INJECTION, SOLUTION INTRAVENOUS at 09:50:00

## 2025-05-19 RX ADMIN — ROCURONIUM BROMIDE 50 MG: 10 INJECTION, SOLUTION INTRAVENOUS at 07:42:00

## 2025-05-19 RX ADMIN — ONDANSETRON 4 MG: 2 INJECTION INTRAMUSCULAR; INTRAVENOUS at 07:49:00

## 2025-05-19 RX ADMIN — ROPIVACAINE HYDROCHLORIDE 30 ML: 5 INJECTION, SOLUTION EPIDURAL; INFILTRATION; PERINEURAL at 07:10:00

## 2025-05-19 NOTE — ANESTHESIA PREPROCEDURE EVALUATION
Anesthesia PreOp Note    HPI:     Anna Dalton is a 80 year old female who presents for preoperative consultation requested by: Mazin Bonilla MD    Date of Surgery: 5/19/2025    Procedure(s):  Left shoulder reverse total shoulder arthroplasty  Indication: Glenohumeral arthritis, left [M19.012]    Relevant Problems   No relevant active problems       NPO:  Last Liquid Consumption Date: 05/18/25  Last Liquid Consumption Time: 2300  Last Solid Consumption Date: 05/18/25  Last Solid Consumption Time: 1900  Last Liquid Consumption Date: 05/18/25          History Review:  Patient Active Problem List    Diagnosis Date Noted    Dysphagia 03/26/2025    Dyspepsia 03/26/2025    Lumbar radiculopathy 11/20/2023    Stress 05/04/2022    Encounter for therapeutic drug monitoring 05/29/2019    Weight gain 03/04/2019    Increased appetite 03/04/2019    High cholesterol 09/25/2018    Obesity (BMI 30-39.9) 09/25/2018    Osteoarthritis 09/25/2018    Dyslipidemia 10/20/2016    Lumbar spinal stenosis 10/20/2016    Carpal tunnel syndrome, bilateral 03/18/2016    Low back pain 01/18/2016    Tendonitis of radial styloid 11/03/2015    Primary osteoarthritis of first carpometacarpal joint of right hand 11/03/2015    Primary osteoarthritis of first carpometacarpal joint of left hand 11/03/2015    Primary osteoarthritis of both first carpometacarpal joints 09/08/2015    Bilateral carpal tunnel syndrome 09/08/2015    Radial styloid tenosynovitis 09/08/2015    Bilateral wrist pain 06/10/2015    Cervical disc disorder with radiculopathy 05/08/2015    Essential hypertension 03/20/2015    Allergic rhinitis 03/20/2015    S/P lumbar laminectomy 03/20/2015    Constipation 10/17/2013       Past Medical History[1]    Past Surgical History[2]    Prescriptions Prior to Admission[3]  Current Medications and Prescriptions Ordered in Epic[4]    Allergies[5]    Family History[6]  Social Hx on file[7]    Available pre-op labs reviewed.  Lab  Results   Component Value Date    WBC 7.0 04/21/2025    RBC 4.21 04/21/2025    HGB 11.8 (L) 04/21/2025    HCT 37.8 04/21/2025    MCV 89.8 04/21/2025    MCH 28.0 04/21/2025    MCHC 31.2 04/21/2025    RDW 13.5 04/21/2025    .0 04/21/2025     Lab Results   Component Value Date     05/15/2025    K 4.3 05/15/2025     05/15/2025    CO2 26.0 05/15/2025    BUN 11 05/15/2025    CREATSERUM 0.92 05/15/2025    GLU 93 05/15/2025    CA 9.5 05/15/2025          Vital Signs:  Body mass index is 29.62 kg/m².   height is 1.651 m (5' 5\") and weight is 80.7 kg (178 lb). Sera's oral temperature is 97.9 °F (36.6 °C). Sera's blood pressure is 148/59 and Sera's pulse is 64. Sera's respiration is 16 and oxygen saturation is 99%.   Vitals:    05/15/25 1328 05/19/25 0554   BP:  148/59   Pulse:  64   Resp:  16   Temp:  97.9 °F (36.6 °C)   TempSrc:  Oral   SpO2:  99%   Weight: 81.6 kg (180 lb) 80.7 kg (178 lb)   Height: 1.676 m (5' 6\") 1.651 m (5' 5\")        Anesthesia Evaluation     Patient summary reviewed and Nursing notes reviewed    Airway   Mallampati: II  Dental      Pulmonary - negative ROS and normal exam   Cardiovascular - normal exam  Exercise tolerance: good    NYHA Classification: I    Neuro/Psych - negative ROS     GI/Hepatic/Renal - negative ROS     Endo/Other - negative ROS   Abdominal                  Anesthesia Plan:   ASA:  2  Plan:   General  Airway:  ETT  Post-op Pain Management: Interscalene block      I have informed Anna Bird Bosby and/or legal guardian or family member of the nature of the anesthetic plan, benefits, risks including possible dental damage if relevant, major complications, and any alternative forms of anesthetic management.   All of the patient's questions were answered to the best of my ability. The patient desires the anesthetic management as planned.  JONE HOLM MD  5/19/2025 7:11 AM  Present on Admission:  **None**           [1]   Past Medical History:    Arthritis    left shoulder     Back problem    Cervical disc disease    Colon polyp    Esophageal reflux    High blood pressure    High cholesterol    HTN (hypertension)    Obesity (BMI 30-39.9)    Osteoarthritis    Other and unspecified hyperlipidemia    Unspecified essential hypertension    Uterine fibroid    Visual impairment    wear glasses   [2]   Past Surgical History:  Procedure Laterality Date          Colonoscopy  2015    Colonoscopy      Hysterectomy      Ir epidural steriod injection      Lumbar spine fusion combined      Spine surgery procedure unlisted  2014    Lumbar microdiscectomy    Total abdominal hysterectomy  1992    w/ BSO    Tubal ligation     [3]   Medications Prior to Admission   Medication Sig Dispense Refill Last Dose/Taking    acetaminophen 500 MG Oral Tab Take 1 tablet (500 mg total) by mouth every 6 (six) hours as needed for Pain.   2025 at  4:00 AM    Ergocalciferol (VITAMIN D OR) Take 1 tablet by mouth daily.   Past Week    TURMERIC OR Take 1 tablet by mouth daily.   2025    Coenzyme Q10 (CO Q-10 OR) Take 1 tablet by mouth daily.   2025    POTASSIUM OR Take 1 tablet by mouth daily.   2025    CRANBERRY OR Take 1 tablet by mouth daily.   2025    Multiple Vitamins-Minerals (OCUVITE PRESERVISION OR) Take 2 drops by mouth daily.   Past Week    hydroCHLOROthiazide 12.5 MG Oral Tab Take 1 tablet (12.5 mg total) by mouth daily. 90 tablet 3 2025 at  9:00 AM    GABAPENTIN 300 MG Oral Cap Take 2 capsules  by mouth in the morning, at noon, and at bedtime. 540 capsule 0 2025 Morning    lansoprazole 30 MG Oral Capsule Delayed Release Take 1 capsule (30 mg total) by mouth before breakfast. 90 capsule 3 2025 at  4:00 AM    Phentermine HCl 30 MG Oral Cap Take 1 capsule (30 mg total) by mouth every morning. 30 capsule 5 2025    escitalopram 5 MG Oral Tab Take 1 tablet (5 mg total) by mouth daily. 90 tablet 1 2025 at  4:00 AM     TOPIRAMATE 25 MG Oral Tab TAKE ONE TABLET BY MOUTH ONE TIME DAILY 90 tablet 0 Past Week    losartan 50 MG Oral Tab Take 1 tablet (50 mg total) by mouth daily. 90 tablet 3 5/18/2025 at  9:00 AM    TRAMADOL 50 MG Oral Tab Take 1 tablet by mouth 2 (two) times daily as needed (severe pain). 60 tablet 0 Past Week    amLODIPine 5 MG Oral Tab Take 1 tablet (5 mg total) by mouth daily. **please address all refills at your upcoming appointment. 90 tablet 3 5/19/2025 at  4:00 AM    Norethindrone 0.35 MG Oral Tab Take 1 tablet (0.35 mg total) by mouth daily. (Patient taking differently: Take 1 tablet (0.35 mg total) by mouth at bedtime.) 84 tablet 3 5/18/2025 at  9:00 PM    clindamycin 1 % External Lotion Apply 1 Application  topically 2 (two) times daily. (Patient taking differently: Apply 1 Application  topically at bedtime.) 60 mL 0 Past Week    levocetirizine 5 MG Oral Tab TAKE ONE TABLET BY MOUTH EVERY DAY AT BEDTIME 90 tablet 3 5/18/2025    acyclovir 5 % External Ointment Apply 1 g topically as needed. 30 g 0 Past Week    fluticasone propionate 50 MCG/ACT Nasal Suspension SPRAY TWICE IN EACH NOSTRIL DAILY (Patient taking differently: 2 sprays by Nasal route daily. SPRAY TWICE IN EACH NOSTRIL DAILY) 3 each 3 Past Week    clotrimazole-betamethasone 1-0.05 % External Cream Apply 1 Application topically 2 (two) times daily as needed. 45 g 3 Past Week    diclofenac 1 % External Gel Apply 4 g topically 4 (four) times daily. (Patient taking differently: Apply 4 g topically daily as needed.) 1 each 0 Taking Differently    ASPIRIN EC LOW DOSE 81 MG Oral Tab EC TAKE 1 TABLET BY MOUTH DAILY. 30 tablet 3 5/11/2025    simvastatin 20 MG Oral Tab Take 1 tablet (20 mg total) by mouth nightly.   Past Week    hydrocortisone 1 % External Cream Apply 1 Application topically 2 (two) times daily. 30 g 0 Taking    METHOCARBAMOL 750 MG Oral Tab Take 1 tablet  by mouth Daily as needed (muscle tightness/spasms). (Patient taking differently: Take  1 tablet (750 mg total) by mouth nightly as needed. Take 1 tablet  by mouth Daily as needed (muscle tightness/spasms).) 90 tablet 0     Probiotic Product (ACIDOPHILUS PROBIOTIC BLEND OR) Take by mouth in the morning and before bedtime.   5/11/2025    Multiple Vitamins-Minerals (MULTI-VITAMIN/MINERALS) Oral Tab Take 1 tablet by mouth in the morning.   5/11/2025   [4]   Current Facility-Administered Medications Ordered in Epic   Medication Dose Route Frequency Provider Last Rate Last Admin    lactated ringers infusion   Intravenous Continuous Mazin Bonilla MD 20 mL/hr at 05/19/25 0638 New Bag at 05/19/25 0638    acetaminophen (Tylenol Extra Strength) tab 1,000 mg  1,000 mg Oral Once Mazin Bonilla MD        ceFAZolin (Ancef) 2g in 10mL IV syringe premix  2 g Intravenous On Call to OR Mary Kay Atkins PA-C         No current [facility]-ordered outpatient medications on file.   [5] No Known Allergies  [6]   Family History  Problem Relation Age of Onset    Diabetes Father     Hypertension Mother     Lipids Mother     Heart Disorder Mother     Dementia Mother     Breast Cancer Sister         in her 30's.    Cancer Other         Uncles with lung cancer    Hypertension Sister    [7]   Social History  Socioeconomic History    Marital status:     Number of children: 5   Occupational History    Occupation:      Comment: retired   Tobacco Use    Smoking status: Every Day     Current packs/day: 0.50     Average packs/day: 0.5 packs/day for 15.9 years (7.9 ttl pk-yrs)     Types: Cigarettes     Start date: 12/26/2017    Smokeless tobacco: Never    Tobacco comments:     currently every day smoker, 5-6 cigs/day   Vaping Use    Vaping status: Never Used   Substance and Sexual Activity    Alcohol use: Yes     Comment: occ    Drug use: Never   Other Topics Concern    Caffeine Concern Yes     Comment: 2 cups tea/day    Exercise Yes     Comment: chair exercise class 1x/week, walks stairs    Reaction to local anesthetic No

## 2025-05-19 NOTE — DISCHARGE INSTRUCTIONS
Norco or tylenol for pain.  celebrex for pain/inflammation.  May remove dressing and place new mepilex or gauze over incision in 4 days.  Keep incision clean and dry.  Ice and elevate shoulder.  Sleep in upright position.  Wear sling at all times.   Follow up with Dr. Bonilla in 1 week [phone].

## 2025-05-19 NOTE — OPERATIVE REPORT
[facility]    PATIENT'S NAME: BATOOL GROSSMAN   ATTENDING PHYSICIAN: Mazin Bonilla MD   OPERATING PHYSICIAN: Mazin Bonilla MD   PATIENT ACCOUNT#:   [id number]    LOCATION:  [facility] PACU 6 [facility] 10  MEDICAL RECORD #:   [id number]       YOB: 1945  ADMISSION DATE:       05/19/2025      OPERATION DATE:  05/19/2025    OPERATIVE REPORT      PREOPERATIVE DIAGNOSIS:  Left shoulder glenohumeral osteoarthritis, primary.  POSTOPERATIVE DIAGNOSIS:  Left shoulder glenohumeral osteoarthritis, primary.  PROCEDURE:  Left reverse total shoulder arthroplasty.    ASSISTANTS:  Mary Kay Atkins PA-C, and LALA Walker.    ANESTHESIA:  General with a regional nerve block.    COMPLICATIONS:  None.    BLOOD LOSS:  100 mL.    SPECIMEN:  Left shoulder bone and soft tissue to Pathology.    DRAIN:  None.    INDICATIONS:  Patient is a 80-year-old female with a history of progressive left shoulder pain unresponsive to conservative care.  Preoperative physical findings and imaging studies were consistent with end-stage osteoarthritis of the glenohumeral joint.  After discussion with the patient the risks and benefits of proceeding with operative treatment of the left shoulder consisting of a reverse total shoulder arthroplasty, she wished to proceed with surgery.    OPERATIVE TECHNIQUE:  The patient was identified in the preoperative holding area.  The appropriate consents were obtained.  She was taken to the operating room and placed in a supine position on the operating room table.  After adequate general and regional anesthesia was obtained, the bed was transformed to the beach chair position.  The right upper extremity was padded and placed on an arm aguila with the medial elbow well padded.  The head was placed in a padded head aguila with eyes, ears, and nose well padded.  The lower extremities were flexed at the knees and hips, and the heels were padded appropriately.  The left shoulder and  upper extremity were then prepped and draped in a sterile fashion.  The left forearm was secured to the Trimano shoulder positioner.  Next a longitudinal incision was then made over the deltopectoral interval.  Deltopectoral interval was identified and developed in the usual fashion.  The cephalic vein was retracted laterally with the deltoid.  Portions of the superior pectoralis major tendon were tenotomized to improve exposure.  Next the clavipectoral fascia was incised and the conjoined tendon was retracted medially.  The subscapularis tendon was palpated, and a full-thickness tenotomy and arthrotomy was made 1 fingerbreadth medial to the lesser tuberosity.  Prior to making tenotomy, the biceps tendon was identified in the anterior shoulder and tenodesed to the surrounding tissue with an Ethibond suture.  Next capsule was elevated from the anterior inferior humeral neck, and the shoulder was dislocated by maximally externally rotating and extending the arm.  Inferior osteophytes were removed from the humerus.  We then sequentially reamed the humerus to a size 9 ream.  The reamer was left in situ, and the cutting guide was inserted.  The standard cut was made with 30 degree retroversion built into the cut.  The humeral head had extensive degenerative changes.  Next we sequentially broached to a size 9 broach.  We left the broach in situ.  The humerus was then retracted posterior inferior to the glenoid with the aid of a Fukuda retractor.  Osteophytes and labrum were then removed from the glenoid.  An anterior glenoid retractor was inserted.  We placed the pin guide in the central portion of the glenoid slightly inferior with a slightly inferior tilt.  The pin was placed.  We then reamed the glenoid in the usual fashion and removed significant central bone as well as a small amount of anterior inferior bone.  We then placed the standard base plate, and this fit appropriately with good stability.  We placed the  appropriate length central screw, 6.5 mm diameter, and the depth was checked.  It appeared appropriately seated.  We then placed the appropriate length 4.75 mm fully-threaded cortical locking screws in the 4 peripheral holes of the glenoid using the glenoid drill guides, and excellent stability was noted.  We then placed a standard 36 mm glenosphere with the offset slightly inferior.  The Laboy taper engaged appropriately, and this was checked with appropriate instruments.  Next we maximally externally rotated the humerus and placed a trial humeral component.  It was difficult to reduce the humerus to the glenoid and therefore we chose to place a minus humeral trial after re-reaming the proximal humerus.  Excellent stability was noted with good range of motion noted.  We then removed the trial broach after dislocating the humerus from the glenosphere again.  We then placed the standard size 9 micro humeral stem, [employer].  We then placed the appropriate 36 mm bearing with a -6 extended neutral humeral tray.  Again the shoulder was reduced, and excellent stability was noted.  We irrigated with a combination of diluted Betadine and sterile saline.  The subscapularis tenotomy was repaired with 0 Ethibond sutures in interrupted fashion.  Portions of the supraspinatus were also repaired with the Ethibond suture.  Next deltopectoral interval was loosely closed with 0 Vicryl sutures.  Skin and subcutaneous layers were closed with 2-0 Vicryl sutures and skin staples.  A sterile dressing was applied as well as an arm sling.  The patient's anesthesia was reversed.  She was extubated and taken to the recovery room in stable condition.  All sponge and instrument counts were reported as correct.  The attending physician, Dr. Bonilla, was present and performed all critical portions of the procedure.  There were no complications.  First and second assistants were medically necessary for the surgery.  They  assisted with patient positioning, retraction of soft tissues for accurate placement of the implants and trials.  They assisted with hemostasis and wound closure as well as bone fragment removal.  Without the aid of the assistants, the surgical procedure would not have been possible.    Dictated By Mazin Bonilla MD  d: 05/19/2025 09:33:57  t: 05/19/2025 10:20:38  [facility] [id number]  DLO/

## 2025-05-19 NOTE — ANESTHESIA PROCEDURE NOTES
Airway  Date/Time: 5/19/2025 7:36 AM  Reason: Elective    Airway not difficult    General Information and Staff   Patient location during procedure: OR  Anesthesiologist: Bal Moore MD  Resident/CRNA: Jessica Abraham CRNA  Performed: CRNA   Performed by: Jessica Abraham CRNA  Authorized by: Bal Moore MD        Indications and Patient Condition  Indications for airway management: anesthesia  Sedation level: deep      Preoxygenated: yesPatient position: sniffing    Mask difficulty assessment: 1 - vent by mask    Final Airway Details    Final airway type: endotracheal airway    Successful airway: ETT  Cuffed: yes   Successful intubation technique: direct laryngoscopy  Endotracheal tube insertion site: oral  Blade: Serge  Blade size: #3  ETT size (mm): 7.0    Cormack-Lehane Classification: grade I - full view of glottis  Placement verified by: capnometry   Cuff volume (mL): 10  Measured from: lips  ETT to lips (cm): 21  Number of attempts at approach: 1  Number of other approaches attempted: 0

## 2025-05-19 NOTE — ANESTHESIA POSTPROCEDURE EVALUATION
Patient: Anna Dalton    Procedure Summary       Date: 05/19/25 Room / Location: [facility] MAIN OR 04 / [facility] MAIN OR    Anesthesia Start: 0729 Anesthesia Stop:     Procedure: Left shoulder reverse total shoulder arthroplasty (Left: Shoulder) Diagnosis:       Glenohumeral arthritis, left      (Glenohumeral arthritis, left [M19.012])    Surgeons: Mazin Bonilla MD Anesthesiologist: Bal Moore MD    Anesthesia Type: general ASA Status: 2            Anesthesia Type: general    Vitals Value Taken Time   /67 05/19/25 09:51   Temp 97.3 °F (36.3 °C) 05/19/25 09:51   Pulse 69 05/19/25 09:51   Resp 14 05/19/25 09:51   SpO2 90 % 05/19/25 09:51   Vitals shown include unfiled device data.    EM AN Post Evaluation:   Patient Evaluated in PACU  Patient Participation: complete - patient participated  Level of Consciousness: sleepy but conscious  Pain Score: 0  Pain Management: adequate  Airway Patency:patent  Dental exam unchanged from preop  Yes    Cardiovascular Status: stable and acceptable  Respiratory Status: acceptable and nasal cannula  Postoperative Hydration acceptable      NEFTALI CORTÉS CRNA  5/19/2025 9:52 AM

## 2025-05-19 NOTE — OPERATIVE REPORT
Operative Note    Patient Name: Anna Dalton    Preoperative Diagnosis: Glenohumeral arthritis, left [M19.012]    Postoperative Diagnosis: Glenohumeral arthritis, left [M19.012]    Primary Surgeon: SAADIA BARROW MD     Assistant: Surya Atkins    Procedures: L Reverse total shoulder arthroplasty    Surgical Findings: above    Anesthesia: General/regional    Complications: none    Specimen: L shoulder bone and soft tissue to pathology    Drains: none    Condition: stable to RR    Estimated Blood Loss: 100cc    SAADIA BARROW MD

## 2025-05-19 NOTE — CONSULTS
[facility]  part of [facility]  Consult Note       Anna Dalton Patient Status:  Outpatient in a Bed    1945  80 year old CSN [id number]   Location Room 8/Room 8-A Attending Mazin Bonilla MD     PCP Estuardo Lopes,          DATE OF ADMISSION: 2025     CHIEF COMPLAINT: Left shoulder pain    HISTORY OF PRESENT ILLNESS  This is a 80 year oldfemale with history of Glenohumeral arthritis  who underwent L Reverse total shoulder arthroplasty . Patient was admitted post operatively for closer monitoring and care. At time of interview, patient reported pain was well controlled. Denied current nausea or vomiting. Patient otherwise denied CP, SOB, abd pain, F/C    PAST MEDICAL HISTORY  Past Medical History[1]     PAST SURGICAL HISTORY  Past Surgical History[2]    ALLERGIES   Patient has no known allergies.    MEDICATIONS  Current Discharge Medication List        CONTINUE these medications which have NOT CHANGED    Details   acetaminophen 500 MG Oral Tab Take 1 tablet (500 mg total) by mouth every 6 (six) hours as needed for Pain.      Ergocalciferol (VITAMIN D OR) Take 1 tablet by mouth daily.      TURMERIC OR Take 1 tablet by mouth daily.      Coenzyme Q10 (CO Q-10 OR) Take 1 tablet by mouth daily.      POTASSIUM OR Take 1 tablet by mouth daily.      CRANBERRY OR Take 1 tablet by mouth daily.      !! Multiple Vitamins-Minerals (OCUVITE PRESERVISION OR) Take 2 drops by mouth daily.      hydroCHLOROthiazide 12.5 MG Oral Tab Take 1 tablet (12.5 mg total) by mouth daily.  Qty: 90 tablet, Refills: 3      GABAPENTIN 300 MG Oral Cap Take 2 capsules  by mouth in the morning, at noon, and at bedtime.  Qty: 540 capsule, Refills: 0    Associated Diagnoses: Spinal stenosis of lumbar region with neurogenic claudication      lansoprazole 30 MG Oral Capsule Delayed Release Take 1 capsule (30 mg total) by mouth before breakfast.  Qty: 90 capsule, Refills: 3      Phentermine HCl 30 MG  Oral Cap Take 1 capsule (30 mg total) by mouth every morning.  Qty: 30 capsule, Refills: 5    Associated Diagnoses: Obesity (BMI 30-39.9)      escitalopram 5 MG Oral Tab Take 1 tablet (5 mg total) by mouth daily.  Qty: 90 tablet, Refills: 1    Associated Diagnoses: Stress      TOPIRAMATE 25 MG Oral Tab TAKE ONE TABLET BY MOUTH ONE TIME DAILY  Qty: 90 tablet, Refills: 0    Associated Diagnoses: Obesity (BMI 30-39.9)      losartan 50 MG Oral Tab Take 1 tablet (50 mg total) by mouth daily.  Qty: 90 tablet, Refills: 3      TRAMADOL 50 MG Oral Tab Take 1 tablet by mouth 2 (two) times daily as needed (severe pain).  Qty: 60 tablet, Refills: 0    Associated Diagnoses: Spinal stenosis of lumbar region with neurogenic claudication      amLODIPine 5 MG Oral Tab Take 1 tablet (5 mg total) by mouth daily. **please address all refills at your upcoming appointment.  Qty: 90 tablet, Refills: 3    Associated Diagnoses: Essential hypertension      Norethindrone 0.35 MG Oral Tab Take 1 tablet (0.35 mg total) by mouth daily.  Qty: 84 tablet, Refills: 3      clindamycin 1 % External Lotion Apply 1 Application  topically 2 (two) times daily.  Qty: 60 mL, Refills: 0      levocetirizine 5 MG Oral Tab TAKE ONE TABLET BY MOUTH EVERY DAY AT BEDTIME  Qty: 90 tablet, Refills: 3      acyclovir 5 % External Ointment Apply 1 g topically as needed.  Qty: 30 g, Refills: 0    Associated Diagnoses: Herpes simplex      fluticasone propionate 50 MCG/ACT Nasal Suspension SPRAY TWICE IN EACH NOSTRIL DAILY  Qty: 3 each, Refills: 3      clotrimazole-betamethasone 1-0.05 % External Cream Apply 1 Application topically 2 (two) times daily as needed.  Qty: 45 g, Refills: 3    Associated Diagnoses: Rash and nonspecific skin eruption      diclofenac 1 % External Gel Apply 4 g topically 4 (four) times daily.  Qty: 1 each, Refills: 0      ASPIRIN EC LOW DOSE 81 MG Oral Tab EC TAKE 1 TABLET BY MOUTH DAILY.  Qty: 30 tablet, Refills: 3      simvastatin 20 MG Oral Tab  Take 1 tablet (20 mg total) by mouth nightly.      hydrocortisone 1 % External Cream Apply 1 Application topically 2 (two) times daily.  Qty: 30 g, Refills: 0      METHOCARBAMOL 750 MG Oral Tab Take 1 tablet  by mouth Daily as needed (muscle tightness/spasms).  Qty: 90 tablet, Refills: 0    Associated Diagnoses: Spinal stenosis of lumbar region with neurogenic claudication      Probiotic Product (ACIDOPHILUS PROBIOTIC BLEND OR) Take by mouth in the morning and before bedtime.      !! Multiple Vitamins-Minerals (MULTI-VITAMIN/MINERALS) Oral Tab Take 1 tablet by mouth in the morning.       !! - Potential duplicate medications found. Please discuss with provider.          SOCIAL HISTORY  Social Hx on file[3]    FAMILY HISTORY  Family History[4]    PHYSICAL EXAM  Vital signs:  /70 (BP Location: Right arm)   Pulse 66   Temp 97.5 °F (36.4 °C) (Oral)   Resp 16   Ht 5' 5\" (1.651 m)   Wt 178 lb (80.7 kg)   SpO2 96%   BMI 29.62 kg/m²      GENERAL:  Awake and alert, in no acute distress.  HEART:  Regular rhythm.  Regular rate   LUNGS:  Air entry was good.  No crackles or wheezes   ABDOMEN: Soft and non-tender.    MUSCULOSKELETAL: Left shoulder dressing in place.  Arm in sling  PSYCHIATRIC: Normal mood      IMAGING  XR SHOULDER, COMPLETE (MIN 2 VIEWS), LEFT (CPT=73030)  Result Date: 5/19/2025  CONCLUSION: Expected postsurgical changes status post recent reversed total left shoulder arthroplasty.    Dictated by (CST): Ilya Lr MD on 5/19/2025 at 11:14 AM     Finalized by (CST): Ilya Lr MD on 5/19/2025 at 11:15 AM            Data:  No results for input(s): \"RBC\", \"HGB\", \"HCT\", \"MCV\", \"MCH\", \"MCHC\", \"RDW\", \"NEPRELIM\", \"WBC\", \"PLT\" in the last 168 hours.  Recent Labs   Lab 05/15/25  1703   GLU 93   BUN 11   CREATSERUM 0.92   CA 9.5   ALB 4.7      K 4.3      CO2 26.0   ALKPHO 87   AST 31   ALT 18   BILT 0.5   TP 7.3       ASSESSMENT/PLAN    Primary osteoarthritis of left shoulder  - s/p Left  reverse total shoulder arthroplasty on   - cont pain control, close monitoring with IV narcotics  - Encourage Incentive spirometry  - PT/OT per Ortho  - F/u Ortho recs    HTN  - controlled  - Restart home regimen  - Monitor and adjust as needed    Hyperlipidemia  -Statin      Plan of care discussed with patient at bedside.  Discussed with  RN.      Kurtis Madrigal MD    This note was prepared using Dragon Medical voice recognition dictation software. As a result errors may occur. When identified these errors have been corrected. While every attempt is made to correct errors during dictation discrepancies may still exist         [1]   Past Medical History:   Arthritis    left shoulder     Back problem    Cervical disc disease    Colon polyp    Esophageal reflux    High blood pressure    High cholesterol    HTN (hypertension)    Obesity (BMI 30-39.9)    Osteoarthritis    Other and unspecified hyperlipidemia    Unspecified essential hypertension    Uterine fibroid    Visual impairment    wear glasses   [2]   Past Surgical History:  Procedure Laterality Date          Colonoscopy  2015    Colonoscopy      Hysterectomy      Ir epidural steriod injection      Lumbar spine fusion combined      Spine surgery procedure unlisted  2014    Lumbar microdiscectomy    Total abdominal hysterectomy      w/ BSO    Tubal ligation     [3]   Social History  Socioeconomic History    Marital status:     Number of children: 5   Occupational History    Occupation:      Comment: retired   Tobacco Use    Smoking status: Every Day     Current packs/day: 0.50     Average packs/day: 0.5 packs/day for 15.9 years (7.9 ttl pk-yrs)     Types: Cigarettes     Start date: 2017    Smokeless tobacco: Never    Tobacco comments:     currently every day smoker, 5-6 cigs/day   Vaping Use    Vaping status: Never Used   Substance and Sexual Activity    Alcohol use: Yes     Comment: occ    Drug use: Never    Other Topics Concern    Caffeine Concern Yes     Comment: 2 cups tea/day    Exercise Yes     Comment: chair exercise class 1x/week, walks stairs    Reaction to local anesthetic No   [4]   Family History  Problem Relation Age of Onset    Diabetes Father     Hypertension Mother     Lipids Mother     Heart Disorder Mother     Dementia Mother     Breast Cancer Sister         in her 30's.    Cancer Other         Uncles with lung cancer    Hypertension Sister

## 2025-05-20 VITALS
HEART RATE: 78 BPM | WEIGHT: 178 LBS | OXYGEN SATURATION: 97 % | BODY MASS INDEX: 29.66 KG/M2 | RESPIRATION RATE: 16 BRPM | TEMPERATURE: 99 F | HEIGHT: 65 IN | DIASTOLIC BLOOD PRESSURE: 58 MMHG | SYSTOLIC BLOOD PRESSURE: 142 MMHG

## 2025-05-20 PROCEDURE — 99214 OFFICE O/P EST MOD 30 MIN: CPT | Performed by: STUDENT IN AN ORGANIZED HEALTH CARE EDUCATION/TRAINING PROGRAM

## 2025-05-20 NOTE — OCCUPATIONAL THERAPY NOTE
OCCUPATIONAL THERAPY EVALUATION - INPATIENT     Room Number: Room 8/Room 8-A  Evaluation Date: 2025  Type of Evaluation: Initial       Physician Order: IP Consult to Occupational Therapy  Reason for Therapy: ADL/IADL Dysfunction and Discharge Planning    OCCUPATIONAL THERAPY ASSESSMENT   Patient is a 80 year old female admitted 2025 for Left reverse TSA.  Prior to admission, patient's baseline is independent with ADLs, Mod I ambulating with cane for longer distances.  Patient is currently functioning below baseline with self care/ADLs.  Patient is requiring contact guard assist and minimal assist as a result of the following impairments: decreased functional strength, decreased functional reach, and pain.     Patient will benefit from with no additional skilled services but increased support at home.    PLAN DURING HOSPITALIZATION  OT Device Recommendations: Reacher        OCCUPATIONAL THERAPY MEDICAL/SOCIAL HISTORY   Problem List   Active Problems:    Glenohumeral arthritis, left    HOME SITUATION  Type of Home: House  Home Layout: Two level  Lives With: Daughter (nephew)  Shower/Tub and Equipment: Tub-shower combo  Hand Dominance: Right  Drives: No    Stairs in Home: 1 RENATA, 7 stairs  Assistive Device(s) Used: cane     Prior Level of [place]: Pt lives with her dtr and nephew in a house. She is independent with ADLs and ambulation with cane for longer distances. She has a tub.     SUBJECTIVE  \"I wish I had a more supportive sling\"    OCCUPATIONAL THERAPY EXAMINATION   OBJECTIVE  Precautions: Limb alert - left  Fall Risk: Standard fall risk    WEIGHT BEARING RESTRICTION  L Upper Extremity: Non-Weight Bearing    PAIN ASSESSMENT  Ratin  Location: left shoulder  Management Techniques: Relaxation; Repositioning    ACTIVITY TOLERANCE                         O2 SATURATIONS       COGNITION  Overall Cognitive Status:  WFL - within functional limits    RANGE OF MOTION   Right Upper extremity ROM is within  functional limits     STRENGTH ASSESSMENT  Right Upper extremity strength is within functional limits     ACTIVITIES OF DAILY LIVING ASSESSMENT  AM-PAC ‘6-Clicks’ Inpatient Daily Activity Short Form  How much help from another person does the patient currently need…  -   Putting on and taking off regular lower body clothing?: A Little  -   Bathing (including washing, rinsing, drying)?: A Little  -   Toileting, which includes using toilet, bedpan or urinal? : A Little  -   Putting on and taking off regular upper body clothing?: A Little  -   Taking care of personal grooming such as brushing teeth?: None  -   Eating meals?: None    AM-PAC Score:  Score: 20  Approx Degree of Impairment: 38.32%  Standardized Score (AM-PAC Scale): 42.03  CMS Modifier (G-Code): CJ    FUNCTIONAL TRANSFER ASSESSMENT  Sit to Stand: Edge of Bed  Edge of Bed: Stand-by Assist  Toilet Transfer: Stand-by Assist    BED MOBILITY  Supine to Sit : Independent  Sit to Supine (OT): Independent    BALANCE ASSESSMENT  Static Sitting: Independent  Static Standing: Stand-by Assist    FUNCTIONAL ADL ASSESSMENT  Eating: Independent  Grooming Standing: Stand-by Assist  Bathing Seated: Minimal Assist  UB Dressing Seated: Minimal Assist  LB Dressing Seated: Minimal Assist  Toileting Standing: Stand-by Assist    THERAPEUTIC EXERCISE     Skilled Therapy Provided: RN approved session. Pt received in the chair, agreeable to tx. Pt reports \"4/10\" pain in her left shoulder. Pt's sister (Alaina) present and very supportive. Per CHIQUI Atkins, clarified ok for AROM hand, wrist, and elbow.Pt was educated in NWB orders for LUE, sling wearing orders, AROM for hand, wrist, and elbow, UB dressing and bathing techniques, positioning. A different sling size was ordered for better fit. Pt and sister educated in donning/doffing sling and wearing instructions. Pt completed functional transfers with SBA to CGA. Pt does not have further acute OT needs. Will sign off.      EDUCATION PROVIDED  Patient Education : Role of Occupational Therapy; Plan of Care; Discharge Recommendations; DME Recommendations; Functional Transfer Techniques; Fall Prevention; Weight Bear Status; Posture/Positioning; Surgical Precautions; Bracing Education Provided  Patient's Response to Education: Verbalized Understanding  Family/Caregiver Education : Role of Occupational Therapy; Plan of Care; Discharge Recommendations; Functional Transfer Techniques  Family/Caregiver's Response to Education: Verbalized Understanding    The patient's Approx Degree of Impairment: 38.32% has been calculated based on documentation in the [facility] '6 clicks' Inpatient Daily Activity Short Form.  Research supports that patients with this level of impairment may benefit from home with HH.  Final disposition will be made by interdisciplinary medical team.    Patient End of Session: Up in chair, Needs met, Call light within reach, RN aware of session/findings, All patient questions and concerns addressed, Hospital anti-slip socks, Family present      Patient Evaluation Complexity Level:   Occupational Profile/Medical History MODERATE - Expanded review of history including review of medical or therapy record   Specific performance deficits impacting engagement in ADL/IADL MODERATE  3 - 5 performance deficits   Client Assessment/Performance Deficits MODERATE - Comorbidities and min to mod modifications of tasks    Clinical Decision Making MODERATE - Analysis of occupational profile, detailed assessments, several treatment options    Overall Complexity MODERATE     OT Session   Self-Care Home Management: 15 minutes  Therapeutic Activity: 15 minutes

## 2025-05-20 NOTE — PROGRESS NOTES
Subjective: No complaints. Pain controlled.    Objective:  Patient Vitals for the past 24 hrs:   BP Temp Temp src Pulse Resp SpO2   05/20/25 0805 142/58 99 °F (37.2 °C) Oral 78 16 97 %   05/20/25 0414 144/60 98.4 °F (36.9 °C) Oral 74 18 98 %   05/19/25 2343 141/66 98.6 °F (37 °C) Oral 78 18 97 %   05/19/25 2109 130/63 98.4 °F (36.9 °C) Oral 66 18 96 %   05/19/25 1514 141/77 97.8 °F (36.6 °C) Temporal 63 16 97 %     Sitting in chair in no acute distress.   Dressing is clean, dry, and intact.  Foot light touch sensation is intact.  Left hand NVI  No calf tenderness.  No calf swelling.  Gopal's sign is negative.   The lower/upper extremity is neurovascularly intact.   The lower/upper extremity compartments are supple and non-tender.   Lab Results   Component Value Date    WBC 7.0 04/21/2025    RBC 4.21 04/21/2025    HGB 11.8 (L) 04/21/2025    MCV 89.8 04/21/2025    MCH 28.0 04/21/2025    MCHC 31.2 04/21/2025    RDW 13.5 04/21/2025    MPV 8.9 05/18/2017     Lab Results   Component Value Date    INR 1.11 04/21/2025       Assessment:  Postoperative day # 1  S/p L reverse TSA    Plan:      Continue pain control.  Continue DVT prophylaxis.  Continue PT/OT.  D/C planning:  home today    SAADIA BARROW MD

## 2025-05-20 NOTE — CM/SW NOTE
05/20/25 0900   Discharge disposition   Expected discharge disposition Home or Self   Discharge transportation Private car     PT received MDO for DC. Per chart review, no SW needs identified.    SW to remain available for support and/or discharge planning.    Shirley Krause MSW, LSW   [id number]

## 2025-05-21 NOTE — DISCHARGE SUMMARY
[facility]  part of [facility]    Discharge Summary    Anna Dalton Patient Status:  Outpatient in a Bed    1945 MRN [id number]   Location [facility] Attending No att. providers found   Hosp Day # 0 PCP Estuardo Lopes DO     Date of Admission: 2025   Date of Discharge: 2025 12:40 PM    Admitting Diagnosis: Glenohumeral arthritis, left [M19.012]    Disposition: Home    Discharge Diagnosis: .Active Problems:    Glenohumeral arthritis, left      Hospital Course:   Reason for Admission: planned left reverse total shoulder arthroplasty       Discharge Physical Exam:     GENERAL:  Awake and alert, in no acute distress.  HEART:  Regular rhythm.  Regular rate   LUNGS:  Air entry was good.  No crackles or wheezes   ABDOMEN: Soft and non-tender.    MUSCULOSKELETAL: Left shoulder dressing in place.  Arm in sling  PSYCHIATRIC: Normal mood        Hospital Course:    This is a 80 year old female with Pmhx of HTN, HLD, and notably Glenohumeral arthritis who underwent L Reverse total shoulder arthroplasty on 25 with Dr. Mazin Bonilla. Patient was admitted post operatively for closer monitoring and care. Postop hospital course uneventful. Patient's pain was controlled, diet advanced. She worked with PT successfully and was cleared to discharge home on POD#1. Discharge instructions and med rec per ortho team. ER precautions reviewed. Patient discharged home.         Complications: None    Consultants         Provider   Role Specialty     Kurtis Madrigal MD      Consulting Physician HOSPITALIST     Masoud Thomas MD      Consulting Physician HOSPITALIST          Surgical Procedures       Case IDs Date Procedure Surgeon Location Status    25 Left shoulder reverse total shoulder arthroplasty Mazin Bonilla MD [facility] MAIN OR Comp              Discharge Plan:   Discharge Condition: Stable    Discharge Medication List as of 2025 12:14 PM         New Orders    Details   celecoxib 200 MG Oral Cap Take 1 capsule (200 mg total) by mouth daily., Normal, Disp-30 capsule, R-0      HYDROcodone-acetaminophen 7.5-325 MG Oral Tab Take 1 tablet by mouth every 6 (six) hours as needed for Pain., Normal, Disp-15 tablet, R-0      polyethylene glycol, PEG 3350, 17 g Oral Powd Pack Take 17 g by mouth daily as needed., Normal, Disp-24 each, R-0           Home Meds - Unchanged    Details   acetaminophen 500 MG Oral Tab Take 1 tablet (500 mg total) by mouth every 6 (six) hours as needed for Pain., Historical      Ergocalciferol (VITAMIN D OR) Take 1 tablet by mouth daily., Historical      TURMERIC OR Take 1 tablet by mouth daily., Historical      Coenzyme Q10 (CO Q-10 OR) Take 1 tablet by mouth daily., Historical      POTASSIUM OR Take 1 tablet by mouth daily., Historical      CRANBERRY OR Take 1 tablet by mouth daily., Historical      !! Multiple Vitamins-Minerals (OCUVITE PRESERVISION OR) Take 2 drops by mouth daily., Historical      hydroCHLOROthiazide 12.5 MG Oral Tab Take 1 tablet (12.5 mg total) by mouth daily., Normal, Disp-90 tablet, R-3      GABAPENTIN 300 MG Oral Cap Take 2 capsules  by mouth in the morning, at noon, and at bedtime., Normal, Disp-540 capsule, R-0      lansoprazole 30 MG Oral Capsule Delayed Release Take 1 capsule (30 mg total) by mouth before breakfast., Normal, Disp-90 capsule, R-3      Phentermine HCl 30 MG Oral Cap Take 1 capsule (30 mg total) by mouth every morning., Normal, Disp-30 capsule, R-5      escitalopram 5 MG Oral Tab Take 1 tablet (5 mg total) by mouth daily., Normal, Disp-90 tablet, R-1      TOPIRAMATE 25 MG Oral Tab TAKE ONE TABLET BY MOUTH ONE TIME DAILY, Normal, Disp-90 tablet, R-0      losartan 50 MG Oral Tab Take 1 tablet (50 mg total) by mouth daily., Normal, Disp-90 tablet, R-3      amLODIPine 5 MG Oral Tab Take 1 tablet (5 mg total) by mouth daily. **please address all refills at your upcoming appointment., Normal, Disp-90  tablet, R-3      Norethindrone 0.35 MG Oral Tab Take 1 tablet (0.35 mg total) by mouth daily., Normal, Disp-84 tablet, R-3      clindamycin 1 % External Lotion Apply 1 Application  topically 2 (two) times daily., Normal, Disp-60 mL, R-0      levocetirizine 5 MG Oral Tab TAKE ONE TABLET BY MOUTH EVERY DAY AT BEDTIME, Normal, Disp-90 tablet, R-3      acyclovir 5 % External Ointment Apply 1 g topically as needed., Normal, Disp-30 g, R-0      fluticasone propionate 50 MCG/ACT Nasal Suspension SPRAY TWICE IN EACH NOSTRIL DAILY, Normal, Disp-3 each, R-3      clotrimazole-betamethasone 1-0.05 % External Cream Apply 1 Application topically 2 (two) times daily as needed., Normal, Disp-45 g, R-3      ASPIRIN EC LOW DOSE 81 MG Oral Tab EC TAKE 1 TABLET BY MOUTH DAILY., Normal, Disp-30 tablet, R-3      simvastatin 20 MG Oral Tab Take 1 tablet (20 mg total) by mouth nightly., Historical      hydrocortisone 1 % External Cream Apply 1 Application topically 2 (two) times daily., Normal, Disp-30 g, R-0      METHOCARBAMOL 750 MG Oral Tab Take 1 tablet  by mouth Daily as needed (muscle tightness/spasms)., Normal, Disp-90 tablet, R-0      Probiotic Product (ACIDOPHILUS PROBIOTIC BLEND OR) Take by mouth in the morning and before bedtime., Historical      !! Multiple Vitamins-Minerals (MULTI-VITAMIN/MINERALS) Oral Tab Take 1 tablet by mouth in the morning., Historical       !! - Potential duplicate medications found. Please discuss with provider.              Discharge Diet: As tolerated    Discharge Activity: As tolerated Per Ortho and PT     Follow up:      Follow-up Information       Estuardo Lopes, DO. Go in 2 day(s).    Specialty: Family Medicine  Why: For hospital discharge follow up after your left shoulder surgery.  Contact information:  [address] [place] [id number] [phone]               Mazin Bonilla MD Follow up in 1 week(s).    Specialty: SURGERY, ORTHOPEDIC  Why: For wound re-check  Contact  information:  [address] [facility]  [address]  [facility] [address]  [phone]                                 Other Discharge Instructions:         Norco or tylenol for pain.  celebrex for pain/inflammation.  May remove dressing and place new mepilex or gauze over incision in 4 days.  Keep incision clean and dry.  Ice and elevate shoulder.  Sleep in upright position.  Wear sling at all times.   Follow up with Dr. Bonilla in 1 week [phone].         >30 minutes spent on discharge orders, coordination, and planning.     Radha Ecueda MD  5/20/2025

## 2025-05-22 ENCOUNTER — TELEPHONE (OUTPATIENT)
Dept: FAMILY MEDICINE CLINIC | Facility: CLINIC | Age: 80
End: 2025-05-22

## 2025-05-22 NOTE — TELEPHONE ENCOUNTER
Patient is requesting a hospital follow up appointment with Dr. Lopes for 25.   Patient was advised to follow up with provider in 2 days at hospital discharge.   Patient declined appointment for 25 offered per being out of town at a     Is Dr. Lopes able to see patient on 25.

## 2025-05-22 NOTE — TELEPHONE ENCOUNTER
Dr Lopes:   Patient had left shoulder reverse shoulder arthroplasty.   Do you need to see her, or best to follow up with ORTHO?    Spoke to patient. She has no other concerns. She is following  discharge summary recommended 2 day follow up with pcp.     Appointment with Dr Bonilla 5/27/25.   IF ok to keep 5/29/25. Then we can leave it at that.  We can send her a [employer] message to keep appointment. Unless you do need to see her sooner.

## 2025-05-23 NOTE — TELEPHONE ENCOUNTER
Patient notified and agreed to plan.    Future Appointments   Date Time Provider Department Center   5/29/2025 10:20 AM Estuardo Lopes DO [facility]   9/18/2025  2:15 PM Jose Elias Templeton MD [id number] [place] [address]

## 2025-05-27 ENCOUNTER — ORDER TRANSCRIPTION (OUTPATIENT)
Dept: PHYSICAL THERAPY | Facility: HOSPITAL | Age: 80
End: 2025-05-27

## 2025-05-27 DIAGNOSIS — Z96.612 S/P REVERSE TOTAL SHOULDER ARTHROPLASTY, LEFT: Primary | ICD-10-CM

## 2025-05-29 ENCOUNTER — OFFICE VISIT (OUTPATIENT)
Dept: FAMILY MEDICINE CLINIC | Facility: CLINIC | Age: 80
End: 2025-05-29
Payer: MEDICARE

## 2025-05-29 ENCOUNTER — OFFICE VISIT (OUTPATIENT)
Dept: PHYSICAL THERAPY | Facility: HOSPITAL | Age: 80
End: 2025-05-29
Attending: ORTHOPAEDIC SURGERY
Payer: MEDICARE

## 2025-05-29 VITALS
BODY MASS INDEX: 30.16 KG/M2 | HEART RATE: 69 BPM | RESPIRATION RATE: 18 BRPM | OXYGEN SATURATION: 96 % | SYSTOLIC BLOOD PRESSURE: 126 MMHG | HEIGHT: 65 IN | DIASTOLIC BLOOD PRESSURE: 71 MMHG | TEMPERATURE: 98 F | WEIGHT: 181 LBS

## 2025-05-29 DIAGNOSIS — G89.29 CHRONIC PAIN IN LEFT SHOULDER: ICD-10-CM

## 2025-05-29 DIAGNOSIS — Z96.612 S/P REVERSE TOTAL SHOULDER ARTHROPLASTY, LEFT: Primary | ICD-10-CM

## 2025-05-29 DIAGNOSIS — Z96.612 STATUS POST TOTAL SHOULDER ARTHROPLASTY, LEFT: Primary | ICD-10-CM

## 2025-05-29 DIAGNOSIS — I10 ESSENTIAL HYPERTENSION: ICD-10-CM

## 2025-05-29 DIAGNOSIS — M19.012 PRIMARY OSTEOARTHRITIS OF LEFT SHOULDER: ICD-10-CM

## 2025-05-29 DIAGNOSIS — M25.512 CHRONIC PAIN IN LEFT SHOULDER: ICD-10-CM

## 2025-05-29 PROCEDURE — 97162 PT EVAL MOD COMPLEX 30 MIN: CPT | Performed by: PHYSICAL THERAPIST

## 2025-05-29 PROCEDURE — 97110 THERAPEUTIC EXERCISES: CPT | Performed by: PHYSICAL THERAPIST

## 2025-05-29 PROCEDURE — 99213 OFFICE O/P EST LOW 20 MIN: CPT | Performed by: FAMILY MEDICINE

## 2025-05-29 PROCEDURE — 97140 MANUAL THERAPY 1/> REGIONS: CPT | Performed by: PHYSICAL THERAPIST

## 2025-05-29 NOTE — PATIENT INSTRUCTIONS
Pain management via prescription medications as needed.  Medication reviewed and renewed where needed and appropriate.  Comply with medications.  Monitor blood pressures and record at home. Limit salt intake.  Recommend weight loss via daily exercising and consistent healthy dietary changes.

## 2025-05-29 NOTE — PROGRESS NOTES
Subjective:     Patient ID: Anna Dalton is a 80 year old female.    This patient is a well-established 80-year-old -American female who is s/p left shoulder arthroplasty. Follow up xray shows that prosthesis is in place.  Patient did have her initial postoperative follow-up with orthopedic specialist office.  Pain managed well with tylenol 650 mg orally as needed and provoked with minimal movement and activity.     Patient has on her postoperative left shoulder sling and appears to be in no acute distress.    Patient has PT set up already and begins today @ 3:30 PM.            History/Other:   Review of Systems  Current Medications[1]  Allergies:Allergies[2]    Past Medical History[3]   Past Surgical History[4]   Family History[5]   Social History: Short Social Hx on File[6]     Objective:   Vitals:    05/29/25 1029   BP: 126/71   Pulse: 69   Resp: 18   Temp: 98 °F (36.7 °C)       Physical Exam  Constitutional:       General: Sera is not in acute distress.     Appearance: Normal appearance. Sera is not ill-appearing.   Cardiovascular:      Rate and Rhythm: Normal rate and regular rhythm.      Heart sounds:      No gallop.   Pulmonary:      Effort: Pulmonary effort is normal.      Breath sounds: Normal breath sounds.   Neurological:      Mental Status: Sera is alert.         Assessment & Plan:   1. Status post total shoulder arthroplasty, left  Routine heal thus far.    2. Primary osteoarthritis of left shoulder  Reason for surgery.    3. Chronic pain in left shoulder  Secondary to #1.    4. Essential hypertension  To goal. Compliance encouraged.      No orders of the defined types were placed in this encounter.      Meds This Visit:  Requested Prescriptions      No prescriptions requested or ordered in this encounter       Imaging & Referrals:  None     Patient Instructions   Pain management via prescription medications as needed.  Medication reviewed and renewed where needed and  appropriate.  Comply with medications.  Monitor blood pressures and record at home. Limit salt intake.  Recommend weight loss via daily exercising and consistent healthy dietary changes.    Return in about 6 weeks (around 7/10/2025), or if symptoms worsen or fail to improve.         [1]   Current Outpatient Medications   Medication Sig Dispense Refill    celecoxib 200 MG Oral Cap Take 1 capsule (200 mg total) by mouth daily. 30 capsule 0    HYDROcodone-acetaminophen 7.5-325 MG Oral Tab Take 1 tablet by mouth every 6 (six) hours as needed for Pain. (Patient not taking: Reported on 5/29/2025) 15 tablet 0    polyethylene glycol, PEG 3350, 17 g Oral Powd Pack Take 17 g by mouth daily as needed. 24 each 0    acetaminophen 500 MG Oral Tab Take 1 tablet (500 mg total) by mouth every 6 (six) hours as needed for Pain.      Ergocalciferol (VITAMIN D OR) Take 1 tablet by mouth daily.      TURMERIC OR Take 1 tablet by mouth daily.      Coenzyme Q10 (CO Q-10 OR) Take 1 tablet by mouth daily.      POTASSIUM OR Take 1 tablet by mouth daily.      CRANBERRY OR Take 1 tablet by mouth daily.      Multiple Vitamins-Minerals (OCUVITE PRESERVISION OR) Take 2 drops by mouth daily.      hydroCHLOROthiazide 12.5 MG Oral Tab Take 1 tablet (12.5 mg total) by mouth daily. 90 tablet 3    GABAPENTIN 300 MG Oral Cap Take 2 capsules  by mouth in the morning, at noon, and at bedtime. 540 capsule 0    lansoprazole 30 MG Oral Capsule Delayed Release Take 1 capsule (30 mg total) by mouth before breakfast. 90 capsule 3    Phentermine HCl 30 MG Oral Cap Take 1 capsule (30 mg total) by mouth every morning. 30 capsule 5    escitalopram 5 MG Oral Tab Take 1 tablet (5 mg total) by mouth daily. 90 tablet 1    TOPIRAMATE 25 MG Oral Tab TAKE ONE TABLET BY MOUTH ONE TIME DAILY 90 tablet 0    losartan 50 MG Oral Tab Take 1 tablet (50 mg total) by mouth daily. 90 tablet 3    amLODIPine 5 MG Oral Tab Take 1 tablet (5 mg total) by mouth daily. **please address all  refills at your upcoming appointment. 90 tablet 3    METHOCARBAMOL 750 MG Oral Tab Take 1 tablet  by mouth Daily as needed (muscle tightness/spasms). (Patient taking differently: Take 1 tablet (750 mg total) by mouth nightly as needed. Take 1 tablet  by mouth Daily as needed (muscle tightness/spasms).) 90 tablet 0    Norethindrone 0.35 MG Oral Tab Take 1 tablet (0.35 mg total) by mouth daily. (Patient taking differently: Take 1 tablet (0.35 mg total) by mouth at bedtime.) 84 tablet 3    clindamycin 1 % External Lotion Apply 1 Application  topically 2 (two) times daily. (Patient taking differently: Apply 1 Application  topically at bedtime.) 60 mL 0    levocetirizine 5 MG Oral Tab TAKE ONE TABLET BY MOUTH EVERY DAY AT BEDTIME 90 tablet 3    acyclovir 5 % External Ointment Apply 1 g topically as needed. 30 g 0    fluticasone propionate 50 MCG/ACT Nasal Suspension SPRAY TWICE IN EACH NOSTRIL DAILY (Patient taking differently: 2 sprays by Nasal route daily. SPRAY TWICE IN EACH NOSTRIL DAILY) 3 each 3    clotrimazole-betamethasone 1-0.05 % External Cream Apply 1 Application topically 2 (two) times daily as needed. 45 g 3    ASPIRIN EC LOW DOSE 81 MG Oral Tab EC TAKE 1 TABLET BY MOUTH DAILY. 30 tablet 3    simvastatin 20 MG Oral Tab Take 1 tablet (20 mg total) by mouth nightly.      hydrocortisone 1 % External Cream Apply 1 Application topically 2 (two) times daily. (Patient taking differently: Apply 1 Application  topically 2 (two) times daily as needed.) 30 g 0    Probiotic Product (ACIDOPHILUS PROBIOTIC BLEND OR) Take by mouth in the morning and before bedtime.      Multiple Vitamins-Minerals (MULTI-VITAMIN/MINERALS) Oral Tab Take 1 tablet by mouth in the morning.     [2] No Known Allergies  [3]   Past Medical History:   Arthritis    left shoulder     Back problem    Cervical disc disease    Colon polyp    Esophageal reflux    High blood pressure    High cholesterol    HTN (hypertension)    Obesity (BMI 30-39.9)     Osteoarthritis    Other and unspecified hyperlipidemia    Unspecified essential hypertension    Uterine fibroid    Visual impairment    wear glasses   [4]   Past Surgical History:  Procedure Laterality Date      1972    Colonoscopy  2015    Colonoscopy      Hysterectomy      Ir epidural steriod injection      Lumbar spine fusion combined      Spine surgery procedure unlisted  2014    Lumbar microdiscectomy    Total abdominal hysterectomy      w/ BSO    Tubal ligation     [5]   Family History  Problem Relation Age of Onset    Diabetes Father     Hypertension Mother     Lipids Mother     Heart Disorder Mother     Dementia Mother     Breast Cancer Sister         in her 30's.    Cancer Other         Uncles with lung cancer    Hypertension Sister    [6]   Social History  Socioeconomic History    Marital status:     Number of children: 5   Occupational History    Occupation:      Comment: retired   Tobacco Use    Smoking status: Every Day     Current packs/day: 0.50     Average packs/day: 0.5 packs/day for 15.9 years (8.0 ttl pk-yrs)     Types: Cigarettes     Start date: 2017    Smokeless tobacco: Never    Tobacco comments:     currently every day smoker, 5-6 cigs/day   Vaping Use    Vaping status: Never Used   Substance and Sexual Activity    Alcohol use: Yes     Comment: occ    Drug use: Never   Other Topics Concern    Caffeine Concern Yes     Comment: 2 cups tea/day    Exercise Yes     Comment: chair exercise class 1x/week, walks stairs    Reaction to local anesthetic No   Social History Narrative    The patient does not use an assistive device..      The patient does live in a home with stairs.

## 2025-05-29 NOTE — PROGRESS NOTES
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 She is currently wearing an arm sling and is taking Tylenol medication for pain relief. She has a PMH of HTN (controlled by meds)  and has occasional N/T in the 2nd-5th digits of her L hand. Due to surgery, she has been having difficulty sleeping, reaching, lifting, carrying, and donning/doffing clothing.   Pain level: current 0 /10, at best 0 /10, at worst 10 /10  Occupation: retired   Prior level of function: independent  Current limitations: sleeping, reaching, lifting, carrying, and donning/doffing clothing  Hand Dominance: right   Past medical history was reviewed with Anna.  Significant findings include:    Imaging/Tests: X-rays: bone on bone   Anna  has a past medical history of Arthritis, Back problem, Cervical disc disease, Colon polyp, Esophageal reflux, High blood pressure, High cholesterol, HTN (hypertension) (2015), Obesity (BMI 30-39.9), Osteoarthritis, Other and unspecified hyperlipidemia, Unspecified essential hypertension, Uterine fibroid, and Visual impairment.  Sera  has a past surgical history that includes spine surgery procedure unlisted (2014); Total abdominal hysterectomy ();  (); colonoscopy (2015); ir epidural steriod injection; hysterectomy; colonoscopy; tubal ligation; and lumbar spine fusion combined.    ASSESSMENT  Anna presents to physical therapy evaluation with primary c/o L  shoulder pain s/p reverse TSA. The results of the objective tests and measures show decreased cervical and shoulder ROM, decreased shoulder strength, impaired posture, decreased flexibility in the UT, and increased pain. Functional deficits include but are not limited to sleeping, reaching, lifting, carrying, and donning/doffing clothing. Signs and symptoms are consistent with diagnosis of S/P reverse total shoulder arthroplasty, left (Z96.612). Pt and PT discussed evaluation findings, pathology, POC and HEP.  Pt voiced understanding and performs HEP correctly without reported pain. Skilled Physical Therapy is medically necessary to address the above impairments and reach functional goals.  OBJECTIVE:    Musculoskeletal  Observation/Posture: forward head posture  Palpation: TTP in the L incisional scars   Accessory motion: NT    Special Tests: NT     ROM and Strength (* denotes performed with pain)     Cervical ROM MMT (-/5)     Flex 45       Ext 45      R L R L     Side bend 30 25         Rotation 80 80       ,   Shoulder   ROM MMT (-/5)    R L R L     Flex 130 PROM 120 4 NT     Ext             Abd (C5) 120 PROM 80 4 NT     IR 70 NT 4 NT     ER 70 NT 4 NT     Low Trap n/a         Mid Trap n/a         SA n/a         ,   Elbow   ROM MMT (-/5)    R L R L     Flex (C6) WFL WFL 5 NT     Ext (C7) WFL WFL 5 NT     Pronation             Supination               Flexibility:  UE Flexibility R L     Upper Trap mod restricted mod restricted     Levator Scap mod restricted mod restricted     Pec Major         Scalenes         Latissimus         Bicep         LE Flexibility Other R L              Neurological:  Sensation: bilat. light touch is intact      Today's Treatment and Response:   Pt education was provided on exam findings, treatment diagnosis, treatment plan, expectations, and prognosis.  Today's Treatment       5/29/2025   UE Treatment   Therapeutic Exercise 1. UT/LS stretch 3x30s ea bilat.   2. Scapular retraction  3x10    Manual Therapy PROM in the L shoulder (No active IR & No passive ER)    Therapeutic Exercise Minutes 15   Manual Therapy Minutes 15   Evaluation Minutes 15   Total Time Of Timed Procedures 30   Total Time Of Service-Based Procedures 15   Total Treatment Time 45   HEP Access Code: [email or web address]  URL: [email or web address]  Date: 05/29/2025  Prepared by: Renan Mary    Exercises  - Seated Gentle Upper Trapezius Stretch  - 1 x daily - 7 x weekly - 3 sets - 30 hold  - Gentle Levator Scapulae Stretch  - 1 x daily - 7 x weekly - 3 sets - 30 hold  - Seated Scapular Retraction  - 1 x daily - 7 x weekly - 3 sets - 10 reps - 3 hold        Patient was instructed in and issued a HEP for: Access Code: [email or web address]  URL: [email or web address]  Date: 05/29/2025  Prepared by: Renan Mary    Exercises  - Seated Gentle Upper Trapezius Stretch  - 1 x daily - 7 x weekly - 3 sets - 30 hold  - Gentle Levator Scapulae Stretch  - 1 x daily - 7 x weekly - 3 sets - 30 hold  - Seated Scapular Retraction  - 1 x daily - 7 x weekly - 3 sets - 10 reps - 3 hold    Charges:  PT EVAL: Moderate Complexity, 1 TE, 1 MT  In agreement with evaluation findings and clinical rationale, this evaluation involved MODERATE COMPLEXITY decision making due to 1-2 personal factors/comorbidities, 3 or more body structures involved/activity limitations, and evolving symptoms as documented in the evaluation.                                                          PLAN OF CARE:    Goals: (to be met in 10 visits)    Not Met Progress Toward Partially Met Met   Pt will report improved ability to sleep without waking due to shoulder pain. [] [] [] []   Pt will improve shoulder flexion AROM to >120 degrees to be able to reach into overhead cabinets without pain or restriction. [] [] [] []   Pt will improve shoulder abduction AROM to >100 degrees to improve ability to don deodorant, don/doff shirts, and wash hair. [] [] [] []   Pt will  increase shoulder AROM ER to 60 to be able to reach and fasten seatbelt. [] [] [] []   Pt will increase shoulder AROM IR to 60 to be able to reach in back pocket, tuck in shirt, and turn steering wheel without pain. [] [] [] []   Pt will improve shoulder strength throughout to 4/5 to improve function with lifting.  [] [] [] []   Pt will be independent and compliant with comprehensive HEP to maintain progress achieved in PT. [] [] [] []         Frequency / Duration: Patient will be seen 2x/week or a total of 10  visits over a 90 day period. Treatment will include: Manual Therapy; Neuromuscular Re-education; Therapeutic Activities; Therapeutic Exercise; Home Exercise Program instruction; Patient/Family Education    Education or treatment limitation: None   Rehab Potential: good     QuickDASH Outcome Score  Score: (Patient-Rptd) 65.91 % (5/28/2025 12:34 PM)      Patient/Family/Caregiver was advised of these findings, precautions, and treatment options and has agreed to actively participate in planning and for this course of care.    Thank you for your referral. Please co-sign or sign and return this letter via fax as soon as possible to [phone]. If you have any questions, please contact me at Dept: [phone]    Sincerely,  Electronically signed by therapist: Chavo Hernandez, SKYE  Physician's certification required: Yes  I certify the need for these services furnished under this plan of treatment and while under my care.    X___________________________________________________ Date____________________    Certification From: 5/29/2025  To:8/27/2025

## 2025-05-31 DIAGNOSIS — E66.9 OBESITY (BMI 30-39.9): ICD-10-CM

## 2025-06-02 RX ORDER — TOPIRAMATE 25 MG/1
25 TABLET, FILM COATED ORAL DAILY
Qty: 90 TABLET | Refills: 0 | Status: SHIPPED | OUTPATIENT
Start: 2025-06-02

## 2025-06-06 ENCOUNTER — OFFICE VISIT (OUTPATIENT)
Dept: PHYSICAL THERAPY | Facility: HOSPITAL | Age: 80
End: 2025-06-06
Attending: ORTHOPAEDIC SURGERY
Payer: MEDICARE

## 2025-06-06 PROCEDURE — 97110 THERAPEUTIC EXERCISES: CPT | Performed by: PHYSICAL THERAPIST

## 2025-06-06 PROCEDURE — 97140 MANUAL THERAPY 1/> REGIONS: CPT | Performed by: PHYSICAL THERAPIST

## 2025-06-06 NOTE — PROGRESS NOTES
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       Pain: 2/10     Objective  See chart below (Performed 5/29/25)       Cervical ROM MMT (-/5)     Flex 45      Ext 45      R L R L     Side bend 30 25       Rotation 80 80          Assessment  Patient is demonstrating improved shoulder PROM. New exercises were added today to improve her elbow, wrist and  strength. She tolerated well with all exercises without increase in pain.    Goals (to be met in 10 visits)      Not Met Progress Toward Partially Met Met   Pt will report improved ability to sleep without waking due to shoulder pain. [] [] [] []   Pt will improve shoulder flexion AROM to >120 degrees to be able to reach into overhead cabinets without pain or restriction. [] [] [] []   Pt will improve shoulder abduction AROM to >100 degrees to improve ability to don deodorant, don/doff shirts, and wash hair. [] [] [] []   Pt will increase shoulder AROM ER to 60 to be able to reach and fasten seatbelt. [] [] [] []   Pt will increase shoulder AROM IR to 60 to be able to reach in back pocket, tuck in shirt, and turn steering wheel without pain. [] [] [] []   Pt will improve shoulder strength throughout to 4/5 to improve function with lifting.  [] [] [] []   Pt will be independent and compliant with comprehensive HEP to maintain progress achieved in PT. [] [] [] []             Plan  Progress as needed.    Treatment Last 4 Visits  Treatment Day: 2       5/29/2025 6/6/2025   UE Treatment   Therapeutic Exercise 1. UT/LS stretch 3x30s ea bilat.   2.  Scapular retraction 3x10  1. UT/LS stretch 3x30s ea bilat.   2. Scapular retraction 3x10   3. Elbow flexion 3x10   4. Wrist flexion/extension 3x10 ea   5. Digi-flex Red all fingers/individual fingers 30x ea    Manual Therapy PROM in the L shoulder (No active IR & No passive ER)  PROM in the L shoulder (No active IR & No passive ER)  STM in the cervical muscles        Therapeutic Exercise Minutes 15 20   Manual Therapy Minutes 15 25   Evaluation Minutes 15    Total Time Of Timed Procedures 30 45   Total Time Of Service-Based Procedures 15 0   Total Treatment Time 45 45   HEP Access Code: [email or web address]  URL: [email or web address]  Date: 05/29/2025  Prepared by: Renan Hernandez    Exercises  - Seated Gentle Upper Trapezius Stretch  - 1 x daily - 7 x weekly - 3 sets - 30 hold  - Gentle Levator Scapulae Stretch  - 1 x daily - 7 x weekly - 3 sets - 30 hold  - Seated Scapular Retraction  - 1 x daily - 7 x weekly - 3 sets - 10 reps - 3 hold         HEP  Access Code: [email or web address]  URL: [email or web address]  Date: 05/29/2025  Prepared by: Renan Hernandez    Exercises  - Seated Gentle Upper Trapezius Stretch  - 1 x daily - 7 x weekly - 3 sets - 30 hold  - Gentle Levator Scapulae Stretch  - 1 x daily - 7 x weekly - 3 sets - 30 hold  - Seated Scapular Retraction  - 1 x daily - 7 x weekly - 3 sets - 10 reps - 3 hold    Charges  1 TE, 2 MT

## 2025-06-12 RX ORDER — ACETAMINOPHEN AND CODEINE PHOSPHATE 120; 12 MG/5ML; MG/5ML
0.35 SOLUTION ORAL DAILY
Qty: 84 TABLET | Refills: 0 | Status: SHIPPED | OUTPATIENT
Start: 2025-06-12

## 2025-06-12 NOTE — TELEPHONE ENCOUNTER
Please review;  [facility] protocol failed/ No protocol   No recent PAP noted with our lab nor in Care Everywhere      Requested Prescriptions   Pending Prescriptions Disp Refills    NORETHINDRONE 0.35 MG Oral Tab [Pharmacy Med Name: Norethindrone 0.35 Mg Tab Xiro] 84 tablet 0     Sig: TAKE ONE TABLET BY MOUTH ONE TIME DAILY       Gynecology Medication Protocol Failed - 6/12/2025  7:32 AM        Failed - PASS--PENDING LAST PAP WNL--VIA MANUAL LOOKUP        Passed - Mammogram in past 12 months        Passed - Medication is active on med list        Passed - Physical or Pelvic/Breast in past 12 or next 3 mos--VIA MANUAL LOOKUP           Future Appointments         Provider Department Appt Notes    In 2 weeks Chavo Hernandez PT [facility] Medicare/BCBS Suppl    In 3 weeks Chavo Hernandez PT [facility] Medicare/BCBS Suppl    In 3 weeks Chavo Hernandez PT [facility] Medicare/BCBS Suppl    In 4 weeks Estuardo Lopes,  [facility] 6 week f/u    In 4 weeks Chavo Hernandez PT [facility] Medicare/BCBS Suppl    In 1 month [facility]    In 1 month [facility]    In 1 month Chavo Hernandez PT [facility] Medicare/BCBS Suppl    In 1 month [facility]    In 1 month Chavo Hernandez PT [facility] Medicare/BCBS Suppl    In 1 month [facility]    In 3 months Jose Elias Templeton MD [facility]     In 4 months Estuardo Lopes DO [facility] annual Medicare wellness exam          Recent Outpatient Visits              6 days ago      [facility] Chavo Hernandez, PT    Office Visit    2 weeks ago S/P reverse total shoulder arthroplasty, left    [facility] Chavo Hrenandez, PT    Office Visit    2 weeks ago Status post total shoulder arthroplasty, left    [facility] Estuardo Lopes, DO    Office Visit    1 month ago Pre-op examination    [facility] Estuardo Lopes, DO    Office Visit    2 months ago Myofascial pain    [facility] [place] Sher Loo, DO    Office Visit

## 2025-06-18 ENCOUNTER — TELEPHONE (OUTPATIENT)
Dept: PHYSICAL THERAPY | Facility: HOSPITAL | Age: 80
End: 2025-06-18

## 2025-06-26 RX ORDER — FLUTICASONE PROPIONATE 50 MCG
2 SPRAY, SUSPENSION (ML) NASAL DAILY
Qty: 48 G | Refills: 3 | Status: SHIPPED | OUTPATIENT
Start: 2025-06-26

## 2025-06-26 NOTE — TELEPHONE ENCOUNTER
Refill Per Protocol     Requested Prescriptions   Pending Prescriptions Disp Refills    FLUTICASONE PROPIONATE 50 MCG/ACT Nasal Suspension [Pharmacy Med Name: Fluticasone Propionate 50 Mcg/Act Spr Hikm] 48 g 0     Sig: SPRAY 2 SPRAYS INTO EACH NOSTRIL DAILY       Allergy Medication Protocol Passed - 6/26/2025  2:16 PM        Passed - In person appointment or virtual visit in the past 12 mos or appointment in next 3 mos     Recent Outpatient Visits              2 weeks [facility]    Office Visit    4 weeks ago S/P reverse total shoulder arthroplasty, left    [facility]    Office Visit    4 weeks ago Status post total shoulder arthroplasty, left    [facility] Estuardo Lopes,     Office Visit    2 months ago Pre-op examination    [facility] Estuardo Lopes,     Office Visit    2 months ago Myofascial pain    [facility] Sher Loo DO    Office Visit          Future Appointments         Provider Department Appt Notes    In 5 days Chavo Hernandez PT [facility] Medicare/BCBS Suppl    In 1 week [facility]    In 1 week [facility]    In 2 weeks Estuardo Lopes DO [facility] 6 week f/u    In 2 weeks [facility]    In 2 weeks Chavo Hernandez PT [facility] Medicare/BCBS Suppl    In 3 weeks [facility]    In 3 weeks [facility]BS [facility]    In 4 weeks [facility]BS [facility]    In 1  month Chavo Hernandez, PT [facility] Medicare/BCBS Suppl    In 1 month Chavo Hernandez, PT [facility] Medicare/BCBS Suppl    In 2 months Jose Elias Templeton MD [facility]     In 4 months Estuardo Lopes,  [facility] annual Medicare wellness exam                    Passed - Medication is active on med list

## 2025-07-01 ENCOUNTER — OFFICE VISIT (OUTPATIENT)
Dept: PHYSICAL THERAPY | Facility: HOSPITAL | Age: 80
End: 2025-07-01
Attending: ORTHOPAEDIC SURGERY
Payer: MEDICARE

## 2025-07-01 PROCEDURE — 97110 THERAPEUTIC EXERCISES: CPT | Performed by: PHYSICAL THERAPIST

## 2025-07-01 PROCEDURE — 97140 MANUAL THERAPY 1/> REGIONS: CPT | Performed by: PHYSICAL THERAPIST

## 2025-07-01 PROCEDURE — 97112 NEUROMUSCULAR REEDUCATION: CPT | Performed by: PHYSICAL THERAPIST

## 2025-07-02 NOTE — PROGRESS NOTES
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       Pain: 2/10     Objective  See chart below (Performed 5/29/25)              Cervical ROM MMT (-/5)     Flex 45       Ext 45       R L R L     Side bend 30 25         Rotation 80 80            Assessment  New AAROM exercises were added today to improve her shoulder ROM. She tolerated well with all exercises without any significant increase in pain.    Goals (to be met in 10 visits)      Not Met Progress Toward Partially Met Met   Pt will report improved ability to sleep without waking due to shoulder pain. [] [] [] []   Pt will improve shoulder flexion AROM to >120 degrees to be able to reach into overhead cabinets without pain or restriction. [] [] [] []   Pt will improve shoulder abduction AROM to >100 degrees to improve ability to don deodorant, don/doff shirts, and wash hair. [] [] [] []   Pt will increase shoulder AROM ER to 60 to be able to reach and fasten seatbelt. [] [] [] []   Pt will increase shoulder AROM IR to 60 to be able to reach in back pocket, tuck in shirt, and turn steering wheel without pain. [] [] [] []   Pt will improve shoulder strength throughout to 4/5 to improve function with lifting.  [] [] [] []   Pt will be independent and compliant with comprehensive HEP to maintain progress achieved in PT. [] [] [] []                 Plan  Progress as needed.    Treatment Last 4 Visits  Treatment Day: 3       5/29/2025 6/6/2025 7/1/2025   UE Treatment   Therapeutic Exercise 1. UT/LS stretch 3x30s ea  bilat.   2. Scapular retraction 3x10  1. UT/LS stretch 3x30s ea bilat.   2. Scapular retraction 3x10   3. Elbow flexion 3x10   4. Wrist flexion/extension 3x10 ea   5. Digi-flex Red all fingers/individual fingers 30x ea  1. Elbow flexion 3x10   2. Finger ladder 3 min. Ea   3. Shoulder flexion AAROM with towel on incline table 3x10 ea    Neuro Re-Education   1. Pulleys FWD/Side 3 min. Ea   2. Seated shoulder flexion AAROM with swiss ball 3x10   3. Digi-flex Red all fingers/individual fingers 30x ea     Manual Therapy PROM in the L shoulder (No active IR & No passive ER)  PROM in the L shoulder (No active IR & No passive ER)  STM in the cervical muscles      PROM in the L shoulder in all directions as tolerated    Therapeutic Exercise Minutes 15 20 20   Neuro Re-Educ Minutes   15   Manual Therapy Minutes 15 25 10   Evaluation Minutes 15     Total Time Of Timed Procedures 30 45 45   Total Time Of Service-Based Procedures 15 0 0   Total Treatment Time 45 45 45   HEP Access Code: [email or web address]  URL: [email or web address]  Date: 05/29/2025  Prepared by: Renan Hernandez    Exercises  - Seated Gentle Upper Trapezius Stretch  - 1 x daily - 7 x weekly - 3 sets - 30 hold  - Gentle Levator Scapulae Stretch  - 1 x daily - 7 x weekly - 3 sets - 30 hold  - Seated Scapular Retraction  - 1 x daily - 7 x weekly - 3 sets - 10 reps - 3 hold          HEP  Access Code: [email or web address]  URL: [email or web address]  Date: 05/29/2025  Prepared by: Renan Hernandez    Exercises  - Seated Gentle Upper Trapezius Stretch  - 1 x daily - 7 x weekly - 3 sets - 30 hold  - Gentle Levator Scapulae Stretch  - 1 x daily - 7 x weekly - 3 sets - 30 hold  - Seated Scapular Retraction  - 1 x daily - 7 x weekly - 3 sets - 10 reps - 3 hold    Charges  1 TE, 1 NR, 1 MT

## 2025-07-03 ENCOUNTER — MED REC SCAN ONLY (OUTPATIENT)
Dept: PHYSICAL MEDICINE AND REHAB | Facility: CLINIC | Age: 80
End: 2025-07-03

## 2025-07-03 ENCOUNTER — OFFICE VISIT (OUTPATIENT)
Dept: PHYSICAL THERAPY | Facility: HOSPITAL | Age: 80
End: 2025-07-03
Attending: ORTHOPAEDIC SURGERY
Payer: MEDICARE

## 2025-07-03 PROCEDURE — 97110 THERAPEUTIC EXERCISES: CPT | Performed by: PHYSICAL THERAPIST

## 2025-07-03 PROCEDURE — 97140 MANUAL THERAPY 1/> REGIONS: CPT | Performed by: PHYSICAL THERAPIST

## 2025-07-03 PROCEDURE — 97112 NEUROMUSCULAR REEDUCATION: CPT | Performed by: PHYSICAL THERAPIST

## 2025-07-03 NOTE — PROGRESS NOTES
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       Pain: 2/10     Objective         Shoulder    ROM MMT (-/5)    R L R L     Flex 130 PROM 120 4 NT     Ext         Abd (C5) 120 PROM 80 4 NT     IR 70 NT 4 NT     ER 70 NT 4 NT     Low Trap n/a       Mid Trap n/a       SA n/a          Assessment  New AAROM exercises were added today to continue to improve her shoulder ROM. She tolerated well with all exercises without any significant increase in pain.    Goals (to be met in 10 visits)      Not Met Progress Toward Partially Met Met   Pt will report improved ability to sleep without waking due to shoulder pain. [] [] [] []   Pt will improve shoulder flexion AROM to >120 degrees to be able to reach into overhead cabinets without pain or restriction. [] [] [] []   Pt will improve shoulder abduction AROM to >100 degrees to improve ability to don deodorant, don/doff shirts, and wash hair. [] [] [] []   Pt will increase shoulder AROM ER to 60 to be able to reach and fasten seatbelt. [] [] [] []   Pt will increase shoulder AROM IR to 60 to be able to reach in back pocket, tuck in shirt, and turn steering wheel without pain. [] [] [] []   Pt will improve shoulder strength throughout to 4/5 to improve function with lifting.  [] [] [] []   Pt will be independent and compliant with comprehensive HEP to maintain progress achieved in PT. [] [] [] []                     Plan  Progress as needed.    Treatment Last 4 Visits  Treatment Day: 4       5/29/2025 6/6/2025 7/1/2025 7/3/2025   UE Treatment    Therapeutic Exercise 1. UT/LS stretch 3x30s ea bilat.   2. Scapular retraction 3x10  1. UT/LS stretch 3x30s ea bilat.   2. Scapular retraction 3x10   3. Elbow flexion 3x10   4. Wrist flexion/extension 3x10 ea   5. Digi-flex Red all fingers/individual fingers 30x ea  1. Elbow flexion 3x10   2. Finger ladder 3 min. Ea   3. Shoulder flexion AAROM with towel on incline table 3x10 ea  1. Elbow flexion 3x10   2. Finger ladder 3 min. Ea FWD  3. Shoulder flexion AAROM with towel on incline table 3x10  4. Supine shoulder flexion AAROM with stick 3x10   5. SA punches 3x10    Neuro Re-Education   1. Pulleys FWD/Side 3 min. Ea   2. Seated shoulder flexion AAROM with swiss ball 3x10   3. Digi-flex Red all fingers/individual fingers 30x ea   1. Pulleys FWD/Side 3 min. Ea   2. Seated shoulder flexion AAROM with swiss ball 3x10   3. Digi-flex Red all fingers/individual fingers 30x ea   4. Standing shoulder ABD AAROM with swiss ball on table 3x10   5. Scapular retraction [id number]      Manual Therapy PROM in the L shoulder (No active IR & No passive ER)  PROM in the L shoulder (No active IR & No passive ER)  STM in the cervical muscles      PROM in the L shoulder in all directions as tolerated  PROM in the L shoulder in all directions as tolerated   Therapeutic Exercise Minutes 15 20 20 20   Neuro Re-Educ Minutes   15 15   Manual Therapy Minutes 15 25 10 10   Evaluation Minutes 15      Total Time Of Timed Procedures 30 45 45 45   Total Time Of Service-Based Procedures 15 0 0 0   Total Treatment Time 45 45 45 45   HEP Access Code: [email or web address]  URL: [email or web address]  Date: 05/29/2025  Prepared by: Renan Hernandez    Exercises  - Seated Gentle Upper Trapezius Stretch  - 1 x daily - 7 x weekly - 3 sets - 30 hold  - Gentle Levator Scapulae Stretch  - 1 x daily - 7 x weekly - 3 sets - 30 hold  - Seated Scapular Retraction  - 1 x daily - 7 x weekly - 3 sets - 10 reps - 3 hold           HEP  Access Code: [email or web address]  URL:  [email or web address]  Date: 05/29/2025  Prepared by: Renan Hernandez    Exercises  - Seated Gentle Upper Trapezius Stretch  - 1 x daily - 7 x weekly - 3 sets - 30 hold  - Gentle Levator Scapulae Stretch  - 1 x daily - 7 x weekly - 3 sets - 30 hold  - Seated Scapular Retraction  - 1 x daily - 7 x weekly - 3 sets - 10 reps - 3 hold    Charges  1 TE, 1 NR, 1 MT

## 2025-07-05 ENCOUNTER — APPOINTMENT (OUTPATIENT)
Dept: GENERAL RADIOLOGY | Facility: HOSPITAL | Age: 80
End: 2025-07-05
Payer: MEDICARE

## 2025-07-05 ENCOUNTER — APPOINTMENT (OUTPATIENT)
Dept: CT IMAGING | Facility: HOSPITAL | Age: 80
End: 2025-07-05
Attending: EMERGENCY MEDICINE
Payer: MEDICARE

## 2025-07-05 ENCOUNTER — HOSPITAL ENCOUNTER (EMERGENCY)
Facility: HOSPITAL | Age: 80
Discharge: HOME | End: 2025-07-05
Attending: EMERGENCY MEDICINE
Payer: MEDICARE

## 2025-07-05 VITALS
BODY MASS INDEX: 29.99 KG/M2 | WEIGHT: 180 LBS | HEART RATE: 82 BPM | RESPIRATION RATE: 17 BRPM | HEIGHT: 65 IN | DIASTOLIC BLOOD PRESSURE: 69 MMHG | TEMPERATURE: 99 F | SYSTOLIC BLOOD PRESSURE: 128 MMHG | OXYGEN SATURATION: 99 %

## 2025-07-05 DIAGNOSIS — R51.9 ACUTE NONINTRACTABLE HEADACHE, UNSPECIFIED HEADACHE TYPE: ICD-10-CM

## 2025-07-05 DIAGNOSIS — M25.512 ACUTE PAIN OF LEFT SHOULDER: ICD-10-CM
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
## 2025-07-05 PROCEDURE — 70450 CT HEAD/BRAIN W/O DYE: CPT | Performed by: RADIOLOGY

## 2025-07-05 PROCEDURE — 99284 EMERGENCY DEPT VISIT MOD MDM: CPT

## 2025-07-05 PROCEDURE — 73030 X-RAY EXAM OF SHOULDER: CPT | Performed by: STUDENT IN AN ORGANIZED HEALTH CARE EDUCATION/TRAINING PROGRAM

## 2025-07-05 NOTE — ED QUICK NOTES
Patient safe to be discharged home per provider. Discharge education given via printed AVS. Reviewed: follow up care, medications and when to seek emergency care. Patient verbalizes understanding and is able to teach back. Patient and nephew ambulated to exit.
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
## 2025-07-08 ENCOUNTER — OFFICE VISIT (OUTPATIENT)
Dept: PHYSICAL THERAPY | Facility: HOSPITAL | Age: 80
End: 2025-07-08
Attending: ORTHOPAEDIC SURGERY
Payer: MEDICARE

## 2025-07-08 PROCEDURE — 97110 THERAPEUTIC EXERCISES: CPT | Performed by: PHYSICAL THERAPIST

## 2025-07-08 PROCEDURE — 97112 NEUROMUSCULAR REEDUCATION: CPT | Performed by: PHYSICAL THERAPIST

## 2025-07-08 PROCEDURE — 97140 MANUAL THERAPY 1/> REGIONS: CPT | Performed by: PHYSICAL THERAPIST
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
## 2025-07-10 ENCOUNTER — OFFICE VISIT (OUTPATIENT)
Dept: PHYSICAL THERAPY | Facility: HOSPITAL | Age: 80
End: 2025-07-10
Attending: ORTHOPAEDIC SURGERY
Payer: MEDICARE

## 2025-07-10 ENCOUNTER — OFFICE VISIT (OUTPATIENT)
Dept: FAMILY MEDICINE CLINIC | Facility: CLINIC | Age: 80
End: 2025-07-10
Payer: MEDICARE

## 2025-07-10 VITALS
BODY MASS INDEX: 29.49 KG/M2 | DIASTOLIC BLOOD PRESSURE: 67 MMHG | WEIGHT: 177 LBS | OXYGEN SATURATION: 100 % | HEIGHT: 65 IN | TEMPERATURE: 98 F | SYSTOLIC BLOOD PRESSURE: 111 MMHG | RESPIRATION RATE: 18 BRPM | HEART RATE: 76 BPM

## 2025-07-10 DIAGNOSIS — M19.012 PRIMARY OSTEOARTHRITIS OF LEFT SHOULDER: ICD-10-CM

## 2025-07-10 DIAGNOSIS — I10 ESSENTIAL HYPERTENSION: ICD-10-CM

## 2025-07-10 DIAGNOSIS — Z96.612 S/P SHOULDER REPLACEMENT, LEFT: Primary | ICD-10-CM

## 2025-07-10 PROCEDURE — 99213 OFFICE O/P EST LOW 20 MIN: CPT | Performed by: FAMILY MEDICINE

## 2025-07-10 PROCEDURE — 97112 NEUROMUSCULAR REEDUCATION: CPT | Performed by: PHYSICAL THERAPIST

## 2025-07-10 PROCEDURE — 97110 THERAPEUTIC EXERCISES: CPT | Performed by: PHYSICAL THERAPIST

## 2025-07-10 PROCEDURE — 97140 MANUAL THERAPY 1/> REGIONS: CPT | Performed by: PHYSICAL THERAPIST

## 2025-07-10 NOTE — PATIENT INSTRUCTIONS
May need to return sooner depending on lab results.  Pain management via prescription medications as needed.  Keep all specialist appointments.
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
## 2025-07-10 NOTE — PROGRESS NOTES
Patient: Anna Dalton (80 year old, female) Referring Provider:  Insurance:   Diagnosis: S/P reverse total shoulder arthroplasty, left (Z96.612) Mazin JUAN MANUEL [place]  MEDICARE   Date of Surgery: 5/19/25 Next MD visit:  [facility] [place]   Precautions:  Other (use comment) NA Referral Information:    Date of Evaluation: Req: 0, Auth: 0, Exp:     05/29/25 POC Auth Visits:  10       Today's Date   7/10/2025    Subjective  Patient reports pain is feeling pretty good.       Pain: 2/10     Objective  Palpation: TTP in the L incisional scars       Assessment  New AAROM exercises were added today to improve her ability to reach towards her side. She tolerated well with all exercises without any significant increase in pain.    Goals (to be met in 10 visits)      Not Met Progress Toward Partially Met Met   Pt will report improved ability to sleep without waking due to shoulder pain. [] [] [] []   Pt will improve shoulder flexion AROM to >120 degrees to be able to reach into overhead cabinets without pain or restriction. [] [] [] []   Pt will improve shoulder abduction AROM to >100 degrees to improve ability to don deodorant, don/doff shirts, and wash hair. [] [] [] []   Pt will increase shoulder AROM ER to 60 to be able to reach and fasten seatbelt. [] [] [] []   Pt will increase shoulder AROM IR to 60 to be able to reach in back pocket, tuck in shirt, and turn steering wheel without pain. [] [] [] []   Pt will improve shoulder strength throughout to 4/5 to improve function with lifting.  [] [] [] []   Pt will be independent and compliant with comprehensive HEP to maintain progress achieved in PT. [] [] [] []                             Plan  Progress as needed.    Treatment Last 4 Visits  Treatment Day: 6       7/1/2025 7/3/2025 7/8/2025 7/10/2025   UE Treatment   Therapeutic Exercise 1. Elbow flexion 3x10   2. Finger ladder 3 min. Ea   3. Shoulder flexion AAROM with towel on incline table 3x10 ea  1. Elbow flexion  3x10   2. Finger ladder 3 min. Ea FWD  3. Shoulder flexion AAROM with towel on incline table 3x10  4. Supine shoulder flexion AAROM with stick 3x10   5. SA punches 3x10  1. Elbow flexion 3x10   2. Finger ladder 3 min. Ea FWD   3. Shoulder flexion AAROM with towel on incline table 3x10   4. Supine shoulder flexion AAROM with stick 3x10   5. SA punches 3x10    1. Finger ladder 2 min. Ea FWD   2. Shoulder flexion AAROM/ABD with towel on incline table 3x10   3. Supine shoulder flexion AAROM with stick 3x10   4. SA punches 3x10      Neuro Re-Education 1. Pulleys FWD/Side 3 min. Ea   2. Seated shoulder flexion AAROM with swiss ball 3x10   3. Digi-flex Red all fingers/individual fingers 30x ea   1. Pulleys FWD/Side 3 min. Ea   2. Seated shoulder flexion AAROM with swiss ball 3x10   3. Digi-flex Red all fingers/individual fingers 30x ea   4. Standing shoulder ABD AAROM with swiss ball on table 3x10   5. Scapular retraction [id number]    1. Pulleys FWD/Side 3 min. Ea   2. Seated shoulder flexion AAROM with swiss ball 3x10   3. Standing shoulder ABD AAROM with swiss ball on table 3x10   4. Scapular retraction [id number] + shoulder isometrics with fit ball 3x10 ea    1. Pulleys FWD/Side 2 min. Ea   2. Seated shoulder flexion AAROM with swiss ball 3x10   3. Standing shoulder ABD AAROM with swiss ball on table 3x10   4. Scapular retraction [id number] + shoulder isometrics flex/ext/ABD/ER with fit ball 2x10 ea    Manual Therapy PROM in the L shoulder in all directions as tolerated  PROM in the L shoulder in all directions as tolerated PROM in the L shoulder in all directions as tolerated PROM in the L shoulder in all directions as tolerated   Therapeutic Exercise Minutes 20 20 20 20   Neuro Re-Educ Minutes 15 15 15 15   Manual Therapy Minutes 10 10 10 10   Total Time Of Timed Procedures 45 45 45 45   Total Time Of Service-Based Procedures 0 0 0 0   Total Treatment Time 45 45 45 45        HEP  Access Code: [email or web address]  URL:  [email or web address]  Date: 05/29/2025  Prepared by: Renan Hernandez    Exercises  - Seated Gentle Upper Trapezius Stretch  - 1 x daily - 7 x weekly - 3 sets - 30 hold  - Gentle Levator Scapulae Stretch  - 1 x daily - 7 x weekly - 3 sets - 30 hold  - Seated Scapular Retraction  - 1 x daily - 7 x weekly - 3 sets - 10 reps - 3 hold    Charges  1 TE, 1 NR, 1 MT

## 2025-07-12 DIAGNOSIS — M48.062 SPINAL STENOSIS OF LUMBAR REGION WITH NEUROGENIC CLAUDICATION: ICD-10-CM

## 2025-07-15 ENCOUNTER — OFFICE VISIT (OUTPATIENT)
Dept: PHYSICAL THERAPY | Facility: HOSPITAL | Age: 80
End: 2025-07-15
Attending: ORTHOPAEDIC SURGERY
Payer: MEDICARE

## 2025-07-15 PROCEDURE — 97110 THERAPEUTIC EXERCISES: CPT | Performed by: PHYSICAL THERAPIST

## 2025-07-15 PROCEDURE — 97112 NEUROMUSCULAR REEDUCATION: CPT | Performed by: PHYSICAL THERAPIST

## 2025-07-15 PROCEDURE — 97140 MANUAL THERAPY 1/> REGIONS: CPT | Performed by: PHYSICAL THERAPIST

## 2025-07-15 RX ORDER — GABAPENTIN 300 MG/1
CAPSULE ORAL
Qty: 540 CAPSULE | Refills: 0 | Status: SHIPPED | OUTPATIENT
Start: 2025-07-15

## 2025-07-15 NOTE — TELEPHONE ENCOUNTER
Refill Request    LAST GFR: 05/15/2025; 64. PROTOCOL PASSED.     Medication request: GABAPENTIN 300 MG Oral Cap. Take 2 capsules  by mouth in the morning, at noon, and at bedtime.     LOV:4/1/2025 Sher Loo DO   Due back to clinic per last office note: Per Dr. Loo: \"Please let me know how you are doing next Tuesday, either through telephone call or MyChart. If no better we can consider repeating caudal epidural steroid injection.\"   NOV: 8/7/2025 Sher Loo DO      [place] 04/15/2025 #540 ([place]    Urine drug screen (if applicable): n/a  Pain contract: n/a    LOV plan (if weaning or changing medications): Per Dr. Loo: \"She elects to continue with her current dose of gabapentin.\"

## 2025-07-16 NOTE — PROGRESS NOTES
Patient: Anna Dalton (80 year old, female) Referring Provider:  Insurance:   Diagnosis: S/P reverse total shoulder arthroplasty, left (Z96.612) Mazin ZAMBRANO GAELHeather  MEDICARE   Date of Surgery: 5/19/25 Next MD visit:  [facility] [place] [facility]   Precautions:  Other (use comment) NA Referral Information:    Date of Evaluation: Req: 0, Auth: 0, Exp:     05/29/25 POC Auth Visits:  10       Today's Date   7/15/2025    Subjective  Patient reports the pain has been feeling pretty good.       Pain: 1/10     Objective  Palpation: TTP in the L incisional scars       Assessment  New gentle AROM exercises were added today to improve her shoulder ROM. She tolerated well with all exercises, but did had increased pain with side lying shoulder flexion so exercise as discontinued.    Goals (to be met in 10 visits)      Not Met Progress Toward Partially Met Met   Pt will report improved ability to sleep without waking due to shoulder pain. [] [] [] []   Pt will improve shoulder flexion AROM to >120 degrees to be able to reach into overhead cabinets without pain or restriction. [] [] [] []   Pt will improve shoulder abduction AROM to >100 degrees to improve ability to don deodorant, don/doff shirts, and wash hair. [] [] [] []   Pt will increase shoulder AROM ER to 60 to be able to reach and fasten seatbelt. [] [] [] []   Pt will increase shoulder AROM IR to 60 to be able to reach in back pocket, tuck in shirt, and turn steering wheel without pain. [] [] [] []   Pt will improve shoulder strength throughout to 4/5 to improve function with lifting.  [] [] [] []   Pt will be independent and compliant with comprehensive HEP to maintain progress achieved in PT. [] [] [] []                                 Plan  Progress as needed.    Treatment Last 4 Visits  Treatment Day: 7       7/3/2025 7/8/2025 7/10/2025 7/15/2025   UE Treatment   Therapeutic Exercise 1. Elbow flexion 3x10   2. Finger ladder 3 min. Ea FWD  3. Shoulder flexion AAROM  with towel on incline table 3x10  4. Supine shoulder flexion AAROM with stick 3x10   5. SA punches 3x10  1. Elbow flexion 3x10   2. Finger ladder 3 min. Ea FWD   3. Shoulder flexion AAROM with towel on incline table 3x10   4. Supine shoulder flexion AAROM with stick 3x10   5. SA punches 3x10    1. Finger ladder 2 min. Ea FWD   2. Shoulder flexion AAROM/ABD with towel on incline table 3x10   3. Supine shoulder flexion AAROM with stick 3x10   4. SA punches 3x10    1. Finger ladder 2 min. Ea FWD   2. Shoulder flexion AAROM/ABD with towel on incline table 3x10   3. Supine shoulder flexion AAROM with stick 3x10   4. SA punches 3x10   5. Side lying shoulder flexion (discontinued due to pain)    Neuro Re-Education 1. Pulleys FWD/Side 3 min. Ea   2. Seated shoulder flexion AAROM with swiss ball 3x10   3. Digi-flex Red all fingers/individual fingers 30x ea   4. Standing shoulder ABD AAROM with swiss ball on table 3x10   5. Scapular retraction [id number]    1. Pulleys FWD/Side 3 min. Ea   2. Seated shoulder flexion AAROM with swiss ball 3x10   3. Standing shoulder ABD AAROM with swiss ball on table 3x10   4. Scapular retraction [id number] + shoulder isometrics with fit ball 3x10 ea    1. Pulleys FWD/Side 2 min. Ea   2. Seated shoulder flexion AAROM with swiss ball 3x10   3. Standing shoulder ABD AAROM with swiss ball on table 3x10   4. Scapular retraction [id number] + shoulder isometrics flex/ext/ABD/ER with fit ball 2x10 ea  1. Pulleys FWD/Side 2 min. Ea   2. Seated shoulder flexion AAROM with swiss ball 3x10   3. Side lying shoulder ABD L 3x10   4. Side lying shoulder ER L 3x10        Manual Therapy PROM in the L shoulder in all directions as tolerated PROM in the L shoulder in all directions as tolerated PROM in the L shoulder in all directions as tolerated PROM in the L shoulder in all directions as tolerated       Therapeutic Exercise Minutes 20 20 20 20   Neuro Re-Educ Minutes 15 15 15 15   Manual Therapy Minutes 10 10 10 10    Total Time Of Timed Procedures 45 45 45 45   Total Time Of Service-Based Procedures 0 0 0 0   Total Treatment Time 45 45 45 45        HEP  Access Code: [email or web address]  URL: [email or web address]  Date: 05/29/2025  Prepared by: Renan Hernandez    Exercises  - Seated Gentle Upper Trapezius Stretch  - 1 x daily - 7 x weekly - 3 sets - 30 hold  - Gentle Levator Scapulae Stretch  - 1 x daily - 7 x weekly - 3 sets - 30 hold  - Seated Scapular Retraction  - 1 x daily - 7 x weekly - 3 sets - 10 reps - 3 hold    Charges  1 TE, 1 NR, 1 MT

## 2025-07-17 ENCOUNTER — OFFICE VISIT (OUTPATIENT)
Dept: PHYSICAL THERAPY | Facility: HOSPITAL | Age: 80
End: 2025-07-17
Attending: ORTHOPAEDIC SURGERY
Payer: MEDICARE

## 2025-07-17 PROCEDURE — 97110 THERAPEUTIC EXERCISES: CPT | Performed by: PHYSICAL THERAPIST

## 2025-07-17 PROCEDURE — 97140 MANUAL THERAPY 1/> REGIONS: CPT | Performed by: PHYSICAL THERAPIST

## 2025-07-17 PROCEDURE — 97112 NEUROMUSCULAR REEDUCATION: CPT | Performed by: PHYSICAL THERAPIST

## 2025-07-18 NOTE — PROGRESS NOTES
Patient: Anna Dalton (80 year old, female) Referring Provider:  Insurance:   Diagnosis: S/P reverse total shoulder arthroplasty, left (Z96.612) Mazin ZAMBRANO GAELHeather  MEDICARE   Date of Surgery: 5/19/25 Next MD visit:  [facility] [place] [facility]   Precautions:  Other (use comment) NA Referral Information:    Date of Evaluation: Req: 0, Auth: 0, Exp:     05/29/25 POC Auth Visits:  10       Today's Date   7/17/2025    Subjective  Patient reports feeling pretty good.       Pain: 2/10     Objective  Palpation: TTP in the L incisional scars          Assessment  New exercises were added today to improve her shoulder and scapular strength so that she can be able to lift with less difficulty. She tolerated well with all exercies without any significant increase in pain.    Goals (to be met in 10 visits)      Not Met Progress Toward Partially Met Met   Pt will report improved ability to sleep without waking due to shoulder pain. [] [] [] []   Pt will improve shoulder flexion AROM to >120 degrees to be able to reach into overhead cabinets without pain or restriction. [] [] [] []   Pt will improve shoulder abduction AROM to >100 degrees to improve ability to don deodorant, don/doff shirts, and wash hair. [] [] [] []   Pt will increase shoulder AROM ER to 60 to be able to reach and fasten seatbelt. [] [] [] []   Pt will increase shoulder AROM IR to 60 to be able to reach in back pocket, tuck in shirt, and turn steering wheel without pain. [] [] [] []   Pt will improve shoulder strength throughout to 4/5 to improve function with lifting.  [] [] [] []   Pt will be independent and compliant with comprehensive HEP to maintain progress achieved in PT. [] [] [] []                                     Plan  Progress as needed.    Treatment Last 4 Visits  Treatment Day: 8       7/8/2025 7/10/2025 7/15/2025 7/17/2025   UE Treatment   Therapeutic Exercise 1. Elbow flexion 3x10   2. Finger ladder 3 min. Ea FWD   3. Shoulder flexion AAROM  with towel on incline table 3x10   4. Supine shoulder flexion AAROM with stick 3x10   5. SA punches 3x10    1. Finger ladder 2 min. Ea FWD   2. Shoulder flexion AAROM/ABD with towel on incline table 3x10   3. Supine shoulder flexion AAROM with stick 3x10   4. SA punches 3x10    1. Finger ladder 2 min. Ea FWD   2. Shoulder flexion AAROM/ABD with towel on incline table 3x10   3. Supine shoulder flexion AAROM with stick 3x10   4. SA punches 3x10   5. Side lying shoulder flexion (discontinued due to pain)  1. Biceps curls 3x10 1#   2. Shoulder flexion AAROM/ABD with towel on incline table 3x10 L  3. Supine shoulder flexion AAROM with stick 3x10 1#  4. SA punches 3x10 1#  5. Side lying shoulder flexion 3x10 L  6. Rows/Shoulder extension 3x10 ea RTB    Neuro Re-Education 1. Pulleys FWD/Side 3 min. Ea   2. Seated shoulder flexion AAROM with swiss ball 3x10   3. Standing shoulder ABD AAROM with swiss ball on table 3x10   4. Scapular retraction [id number] + shoulder isometrics with fit ball 3x10 ea    1. Pulleys FWD/Side 2 min. Ea   2. Seated shoulder flexion AAROM with swiss ball 3x10   3. Standing shoulder ABD AAROM with swiss ball on table 3x10   4. Scapular retraction [id number] + shoulder isometrics flex/ext/ABD/ER with fit ball 2x10 ea  1. Pulleys FWD/Side 2 min. Ea   2. Seated shoulder flexion AAROM with swiss ball 3x10   3. Side lying shoulder ABD L 3x10   4. Side lying shoulder ER L 3x10      1. Pulleys FWD/Side 2 min. Ea   2. Seated shoulder flexion AAROM with swiss ball 3x10   3. Side lying shoulder ABD L 3x10   4. Side lying shoulder ER L 3x10    Manual Therapy PROM in the L shoulder in all directions as tolerated PROM in the L shoulder in all directions as tolerated PROM in the L shoulder in all directions as tolerated     PROM in the L shoulder in all directions as tolerated   Therapeutic Exercise Minutes 20 20 20 20   Neuro Re-Educ Minutes 15 15 15 15   Manual Therapy Minutes 10 10 10 10   Total Time Of Timed  Procedures 45 45 45 45   Total Time Of Service-Based Procedures 0 0 0 0   Total Treatment Time 45 45 45 45        HEP  Access Code: [email or web address]  URL: [email or web address]  Date: 05/29/2025  Prepared by: Renan Hernandez    Exercises  - Seated Gentle Upper Trapezius Stretch  - 1 x daily - 7 x weekly - 3 sets - 30 hold  - Gentle Levator Scapulae Stretch  - 1 x daily - 7 x weekly - 3 sets - 30 hold  - Seated Scapular Retraction  - 1 x daily - 7 x weekly - 3 sets - 10 reps - 3 hold    Charges  1 TE, 1 NR, 1 MT

## 2025-07-22 ENCOUNTER — OFFICE VISIT (OUTPATIENT)
Dept: PHYSICAL THERAPY | Facility: HOSPITAL | Age: 80
End: 2025-07-22
Attending: ORTHOPAEDIC SURGERY
Payer: MEDICARE

## 2025-07-22 PROCEDURE — 97110 THERAPEUTIC EXERCISES: CPT | Performed by: PHYSICAL THERAPIST

## 2025-07-22 PROCEDURE — 97112 NEUROMUSCULAR REEDUCATION: CPT | Performed by: PHYSICAL THERAPIST

## 2025-07-22 PROCEDURE — 97140 MANUAL THERAPY 1/> REGIONS: CPT | Performed by: PHYSICAL THERAPIST

## 2025-07-22 NOTE — PROGRESS NOTES
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       Pain: 1/10     Objective  Palpation: TTP in the L incisional scars         Assessment  New exercises were added today to improve her shoulder and scapular strength so that she can be able to lift with less difficulty. She tolerated well with all exercies without any significant increase in pain. SA wall slide exercise was difficulty due to strength weakness.    Goals (to be met in 10 visits)      Not Met Progress Toward Partially Met Met   Pt will report improved ability to sleep without waking due to shoulder pain. [] [] [] []   Pt will improve shoulder flexion AROM to >120 degrees to be able to reach into overhead cabinets without pain or restriction. [] [] [] []   Pt will improve shoulder abduction AROM to >100 degrees to improve ability to don deodorant, don/doff shirts, and wash hair. [] [] [] []   Pt will increase shoulder AROM ER to 60 to be able to reach and fasten seatbelt. [] [] [] []   Pt will increase shoulder AROM IR to 60 to be able to reach in back pocket, tuck in shirt, and turn steering wheel without pain. [] [] [] []   Pt will improve shoulder strength throughout to 4/5 to improve function with lifting.  [] [] [] []   Pt will be independent and compliant with comprehensive HEP to maintain progress achieved in PT. [] [] [] []                                         Plan  Progress as needed.    Treatment Last 4 Visits  Treatment Day: 9       7/10/2025 7/15/2025 7/17/2025 7/22/2025   UE Treatment   Therapeutic Exercise 1. Finger ladder  2 min. Ea FWD   2. Shoulder flexion AAROM/ABD with towel on incline table 3x10   3. Supine shoulder flexion AAROM with stick 3x10   4. SA punches 3x10    1. Finger ladder 2 min. Ea FWD   2. Shoulder flexion AAROM/ABD with towel on incline table 3x10   3. Supine shoulder flexion AAROM with stick 3x10   4. SA punches 3x10   5. Side lying shoulder flexion (discontinued due to pain)  1. Biceps curls 3x10 1#   2. Shoulder flexion AAROM/ABD with towel on incline table 3x10 L  3. Supine shoulder flexion AAROM with stick 3x10 1#  4. SA punches 3x10 1#  5. Side lying shoulder flexion 3x10 L  6. Rows/Shoulder extension 3x10 ea RTB  1. Biceps curls 3x10 2#   2. Shoulder flexion AAROM/ABD with towel on incline table 3x10 L   3. SA wall slides 2x10  4. SA punches 3x10 2#   5. Side lying shoulder flexion 3x10 L 1#  6. Rows/Shoulder extension 3x10 ea RTB    Neuro Re-Education 1. Pulleys FWD/Side 2 min. Ea   2. Seated shoulder flexion AAROM with swiss ball 3x10   3. Standing shoulder ABD AAROM with swiss ball on table 3x10   4. Scapular retraction [id number] + shoulder isometrics flex/ext/ABD/ER with fit ball 2x10 ea  1. Pulleys FWD/Side 2 min. Ea   2. Seated shoulder flexion AAROM with swiss ball 3x10   3. Side lying shoulder ABD L 3x10   4. Side lying shoulder ER L 3x10      1. Pulleys FWD/Side 2 min. Ea   2. Seated shoulder flexion AAROM with swiss ball 3x10   3. Side lying shoulder ABD L 3x10   4. Side lying shoulder ER L 3x10  1. Finger ladder 2 min. Ea   2. Seated shoulder flexion AAROM with swiss ball 3x10   3. Side lying shoulder ABD L 3x10 1#  4. Side lying shoulder ER L 3x10 1#  5. Pulleys FWD/Side 2 min. Ea      Manual Therapy PROM in the L shoulder in all directions as tolerated PROM in the L shoulder in all directions as tolerated     PROM in the L shoulder in all directions as tolerated PROM in the L shoulder in all directions as tolerated   Therapeutic Exercise Minutes 20 20 20 20   Neuro Re-Educ Minutes 15 15 15 15    Manual Therapy Minutes 10 10 10 10   Total Time Of Timed Procedures 45 45 45 45   Total Time Of Service-Based Procedures 0 0 0 0   Total Treatment Time 45 45 45 45        HEP  Access Code: [email or web address]  URL: [email or web address]  Date: 05/29/2025  Prepared by: Renan Hernandez    Exercises  - Seated Gentle Upper Trapezius Stretch  - 1 x daily - 7 x weekly - 3 sets - 30 hold  - Gentle Levator Scapulae Stretch  - 1 x daily - 7 x weekly - 3 sets - 30 hold  - Seated Scapular Retraction  - 1 x daily - 7 x weekly - 3 sets - 10 reps - 3 hold    Charges  1 TE, 1 NR, 1 MT

## 2025-07-24 ENCOUNTER — OFFICE VISIT (OUTPATIENT)
Dept: PHYSICAL THERAPY | Facility: HOSPITAL | Age: 80
End: 2025-07-24
Attending: ORTHOPAEDIC SURGERY
Payer: MEDICARE

## 2025-07-24 PROCEDURE — 97112 NEUROMUSCULAR REEDUCATION: CPT | Performed by: PHYSICAL THERAPIST

## 2025-07-24 PROCEDURE — 97140 MANUAL THERAPY 1/> REGIONS: CPT | Performed by: PHYSICAL THERAPIST

## 2025-07-24 PROCEDURE — 97110 THERAPEUTIC EXERCISES: CPT | Performed by: PHYSICAL THERAPIST

## 2025-07-25 NOTE — PROGRESS NOTES
Progress Summary  Pt has attended 10 visits in Physical Therapy.    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       Pain: 0/10     Objective            Shoulder    ROM MMT (-/5)    R L R L     Flex 130 115 4 3     Ext         Abd (C5) 120 115 4 3     IR 70 40 4 3     ER 70 50 4 3     Low Trap n/a       Mid Trap n/a       SA n/a          Assessment  Patient has benefited well in PT with improved pain and ROM. However, she continues to have strength weakness in her shoulder in which reaching and lifting overhead is still difficulty. Patient will continue to benefit from PT to address remaining deficits so that she can return to PLOF with less pain and difficulty.    Goals (to be met in 10 visits)      Not Met Progress Toward Partially Met Met   Pt will report improved ability to sleep without waking due to shoulder pain. [] [x] [] []   Pt will improve shoulder flexion AROM to >120 degrees to be able to reach into overhead cabinets without pain or restriction. [] [x] [] []   Pt will improve shoulder abduction AROM to >100 degrees to improve ability to don deodorant, don/doff shirts, and wash hair. [] [] [] [x]   Pt will increase shoulder AROM ER to 60 to be able to reach and fasten seatbelt. [] [x] [] []   Pt will increase shoulder AROM IR to 60 to be able to reach in back pocket, tuck in shirt, and turn steering wheel without pain. [] [x] [] []   Pt will improve shoulder strength throughout to 4/5 to improve function with lifting.  [] [x] [] []   Pt will be independent and  compliant with comprehensive HEP to maintain progress achieved in PT. [] [x] [] []          Plan: Continue skilled Physical Therapy 2 x/week or a total of 8 visits over a 90 day period. Treatment will include:  Manual Therapy; Neuromuscular Re-education; Therapeutic Activities; Therapeutic Exercise; Home Exercise Program instruction; Patient/Family Education        Patient/Family/Caregiver was advised of these findings, precautions, and treatment options and has agreed to actively participate in planning and for this course of care.    Thank you for your referral. If you have any questions, please contact me at Dept: [phone].    Sincerely,  Electronically signed by therapist: Chavo Hernandez, PT     Physician's certification required:  Yes  Please co-sign or sign and return this letter via fax as soon as possible to [phone].   I certify the need for these services furnished under this plan of treatment and while under my care.    X___________________________________________________ Date____________________    Certification From: 7/25/2025  To:10/23/2025    Treatment Last 4 Visits  Treatment Day: 10       7/15/2025 7/17/2025 7/22/2025 7/24/2025   UE Treatment   Therapeutic Exercise 1. Finger ladder 2 min. Ea FWD   2. Shoulder flexion AAROM/ABD with towel on incline table 3x10   3. Supine shoulder flexion AAROM with stick 3x10   4. SA punches 3x10   5. Side lying shoulder flexion (discontinued due to pain)  1. Biceps curls 3x10 1#   2. Shoulder flexion AAROM/ABD with towel on incline table 3x10 L  3. Supine shoulder flexion AAROM with stick 3x10 1#  4. SA punches 3x10 1#  5. Side lying shoulder flexion 3x10 L  6. Rows/Shoulder extension 3x10 ea RTB  1. Biceps curls 3x10 2#   2. Shoulder flexion AAROM/ABD with towel on incline table 3x10 L   3. SA wall slides 2x10  4. SA punches 3x10 2#   5. Side lying shoulder flexion 3x10 L 1#  6. Rows/Shoulder extension 3x10 ea RTB  1. Biceps curls 3x10 2#   2. Shoulder  flexion AAROM/ABD with towel on incline table 3x10 L   3. SA wall slides 3x10   4. SA punches 3x10 2#   5. Side lying shoulder flexion 3x10 L  6. Rows/Shoulder extension 3x10 ea RTB      Neuro Re-Education 1. Pulleys FWD/Side 2 min. Ea   2. Seated shoulder flexion AAROM with swiss ball 3x10   3. Side lying shoulder ABD L 3x10   4. Side lying shoulder ER L 3x10      1. Pulleys FWD/Side 2 min. Ea   2. Seated shoulder flexion AAROM with swiss ball 3x10   3. Side lying shoulder ABD L 3x10   4. Side lying shoulder ER L 3x10  1. Finger ladder 2 min. Ea   2. Seated shoulder flexion AAROM with swiss ball 3x10   3. Side lying shoulder ABD L 3x10 1#  4. Side lying shoulder ER L 3x10 1#  5. Pulleys FWD/Side 2 min. Ea    1. Finger ladder 2 min. Ea   2. Seated shoulder flexion AAROM with swiss ball 3x10   3. Side lying shoulder ABD L 3x10 1#   4. Side lying shoulder ER L 3x10 1#   5. Standing Shoulder ER 3x10 YTB L     Manual Therapy PROM in the L shoulder in all directions as tolerated     PROM in the L shoulder in all directions as tolerated PROM in the L shoulder in all directions as tolerated PROM in the L shoulder in all directions as tolerated   Therapeutic Exercise Minutes 20 20 20 20   Neuro Re-Educ Minutes 15 15 15 15   Manual Therapy Minutes 10 10 10 10   Total Time Of Timed Procedures 45 45 45 45   Total Time Of Service-Based Procedures 0 0 0 0   Total Treatment Time 45 45 45 45        HEP  Access Code: [email or web address]  URL: [email or web address]  Date: 05/29/2025  Prepared by: Renan Hernandez    Exercises  - Seated Gentle Upper Trapezius Stretch  - 1 x daily - 7 x weekly - 3 sets - 30 hold  - Gentle Levator Scapulae Stretch  - 1 x daily - 7 x weekly - 3 sets - 30 hold  - Seated Scapular Retraction  - 1 x daily - 7 x weekly - 3 sets - 10 reps - 3 hold    Charges  1 TE, 1 NR, 1 MT

## 2025-07-29 ENCOUNTER — OFFICE VISIT (OUTPATIENT)
Dept: PHYSICAL THERAPY | Facility: HOSPITAL | Age: 80
End: 2025-07-29
Attending: ORTHOPAEDIC SURGERY
Payer: MEDICARE

## 2025-07-29 PROCEDURE — 97110 THERAPEUTIC EXERCISES: CPT | Performed by: PHYSICAL THERAPIST

## 2025-07-29 PROCEDURE — 97140 MANUAL THERAPY 1/> REGIONS: CPT | Performed by: PHYSICAL THERAPIST

## 2025-07-29 PROCEDURE — 97112 NEUROMUSCULAR REEDUCATION: CPT | Performed by: PHYSICAL THERAPIST

## 2025-07-31 ENCOUNTER — TELEPHONE (OUTPATIENT)
Dept: PHYSICAL MEDICINE AND REHAB | Facility: CLINIC | Age: 80
End: 2025-07-31

## 2025-07-31 ENCOUNTER — OFFICE VISIT (OUTPATIENT)
Dept: PHYSICAL MEDICINE AND REHAB | Facility: CLINIC | Age: 80
End: 2025-07-31
Payer: MEDICARE

## 2025-07-31 ENCOUNTER — OFFICE VISIT (OUTPATIENT)
Dept: PHYSICAL THERAPY | Facility: HOSPITAL | Age: 80
End: 2025-07-31
Attending: ORTHOPAEDIC SURGERY
Payer: MEDICARE

## 2025-07-31 VITALS — HEIGHT: 65 IN | WEIGHT: 180 LBS | BODY MASS INDEX: 29.99 KG/M2

## 2025-07-31 DIAGNOSIS — M79.18 MYOFASCIAL PAIN: Primary | ICD-10-CM

## 2025-07-31 DIAGNOSIS — M48.062 SPINAL STENOSIS OF LUMBAR REGION WITH NEUROGENIC CLAUDICATION: ICD-10-CM

## 2025-07-31 PROCEDURE — 20553 NJX 1/MLT TRIGGER POINTS 3/>: CPT | Performed by: PHYSICAL MEDICINE & REHABILITATION

## 2025-07-31 PROCEDURE — 97110 THERAPEUTIC EXERCISES: CPT | Performed by: PHYSICAL THERAPIST

## 2025-07-31 PROCEDURE — 97112 NEUROMUSCULAR REEDUCATION: CPT | Performed by: PHYSICAL THERAPIST

## 2025-07-31 PROCEDURE — 97140 MANUAL THERAPY 1/> REGIONS: CPT | Performed by: PHYSICAL THERAPIST

## 2025-07-31 PROCEDURE — 99214 OFFICE O/P EST MOD 30 MIN: CPT | Performed by: PHYSICAL MEDICINE & REHABILITATION

## 2025-07-31 RX ORDER — BUPIVACAINE HYDROCHLORIDE 2.5 MG/ML
2 INJECTION, SOLUTION EPIDURAL; INFILTRATION; INTRACAUDAL; PERINEURAL ONCE
Status: COMPLETED | OUTPATIENT
Start: 2025-07-31 | End: 2025-07-31

## 2025-07-31 RX ORDER — LIDOCAINE HYDROCHLORIDE 10 MG/ML
3 INJECTION, SOLUTION INFILTRATION; PERINEURAL ONCE
Status: COMPLETED | OUTPATIENT
Start: 2025-07-31 | End: 2025-07-31

## 2025-07-31 RX ORDER — METHOCARBAMOL 750 MG/1
TABLET, FILM COATED ORAL
Qty: 90 TABLET | Refills: 0 | Status: SHIPPED | OUTPATIENT
Start: 2025-07-31

## 2025-08-07 ENCOUNTER — OFFICE VISIT (OUTPATIENT)
Dept: PHYSICAL THERAPY | Facility: HOSPITAL | Age: 80
End: 2025-08-07
Attending: FAMILY MEDICINE

## 2025-08-07 PROCEDURE — 97112 NEUROMUSCULAR REEDUCATION: CPT | Performed by: PHYSICAL THERAPIST

## 2025-08-07 PROCEDURE — 97110 THERAPEUTIC EXERCISES: CPT | Performed by: PHYSICAL THERAPIST

## 2025-08-07 PROCEDURE — 97140 MANUAL THERAPY 1/> REGIONS: CPT | Performed by: PHYSICAL THERAPIST

## 2025-08-15 ENCOUNTER — OFFICE VISIT (OUTPATIENT)
Dept: PHYSICAL THERAPY | Facility: HOSPITAL | Age: 80
End: 2025-08-15
Attending: FAMILY MEDICINE

## 2025-08-15 PROCEDURE — 97112 NEUROMUSCULAR REEDUCATION: CPT | Performed by: PHYSICAL THERAPIST

## 2025-08-15 PROCEDURE — 97110 THERAPEUTIC EXERCISES: CPT | Performed by: PHYSICAL THERAPIST

## 2025-08-15 PROCEDURE — 97140 MANUAL THERAPY 1/> REGIONS: CPT | Performed by: PHYSICAL THERAPIST

## 2025-08-22 ENCOUNTER — OFFICE VISIT (OUTPATIENT)
Dept: PHYSICAL THERAPY | Facility: HOSPITAL | Age: 80
End: 2025-08-22
Attending: FAMILY MEDICINE

## 2025-08-22 PROCEDURE — 97110 THERAPEUTIC EXERCISES: CPT | Performed by: PHYSICAL THERAPIST

## 2025-08-22 PROCEDURE — 97112 NEUROMUSCULAR REEDUCATION: CPT | Performed by: PHYSICAL THERAPIST

## 2025-08-22 PROCEDURE — 97140 MANUAL THERAPY 1/> REGIONS: CPT | Performed by: PHYSICAL THERAPIST

## 2025-08-25 DIAGNOSIS — F43.9 STRESS: ICD-10-CM

## 2025-08-25 RX ORDER — ESCITALOPRAM OXALATE 5 MG/1
5 TABLET ORAL DAILY
Qty: 90 TABLET | Refills: 0 | Status: SHIPPED | OUTPATIENT
Start: 2025-08-25

## 2025-08-27 ENCOUNTER — OFFICE VISIT (OUTPATIENT)
Dept: PHYSICAL THERAPY | Facility: HOSPITAL | Age: 80
End: 2025-08-27
Attending: FAMILY MEDICINE

## 2025-08-27 PROCEDURE — 97110 THERAPEUTIC EXERCISES: CPT | Performed by: PHYSICAL THERAPIST

## 2025-08-27 PROCEDURE — 97112 NEUROMUSCULAR REEDUCATION: CPT | Performed by: PHYSICAL THERAPIST

## 2025-08-28 DIAGNOSIS — E66.9 OBESITY (BMI 30-39.9): ICD-10-CM

## 2025-08-28 RX ORDER — TOPIRAMATE 25 MG/1
25 TABLET, FILM COATED ORAL DAILY
Qty: 90 TABLET | Refills: 0 | Status: SHIPPED | OUTPATIENT
Start: 2025-08-28

## (undated) DEVICE — KIT BEACH CHR FOAM W/ SUPP ARM DRP

## (undated) DEVICE — COVER,TABLE,60X90,STERILE: Brand: MEDLINE

## (undated) DEVICE — BIT DRL 2.7MM PERIPH FOR COMPHSVE REV

## (undated) DEVICE — ANTIBACTERIAL UNDYED BRAIDED (POLYGLACTIN 910), SYNTHETIC ABSORBABLE SUTURE: Brand: COATED VICRYL

## (undated) DEVICE — PACK CDS SHOULDER

## (undated) DEVICE — Device: Brand: STABLECUT®

## (undated) DEVICE — WRAP COOLING SHLDR W/ICE PILLO

## (undated) DEVICE — BEACH CHAIR MASK SGL USE

## (undated) DEVICE — SHOULDER P.A.D II: Brand: DEROYAL

## (undated) DEVICE — SPONGE LAP 18X18IN WHT COT 4 PLY FLD STRUNG

## (undated) DEVICE — SUT COAT VCRL 0 27IN CP-1 ABSRB UD 36MM 1/2

## (undated) DEVICE — SLIM BODY SKIN STAPLER: Brand: APPOSE ULC

## (undated) DEVICE — KIT EVAC 400CC DIA1/8IN H PAT 12.5IN 3 SPR

## (undated) DEVICE — GAMMEX® PI HYBRID SIZE 8, STERILE POWDER-FREE SURGICAL GLOVE, POLYISOPRENE AND NEOPRENE BLEND: Brand: GAMMEX

## (undated) DEVICE — GOWN SURG XL DISP LEV 3 AERO BLU RAGLAN SL

## (undated) DEVICE — GOWN, ORBIS, LVL3, 2XL, ST

## (undated) DEVICE — APPLICATOR PREP 26ML CHG 2% ISO ALC 70%

## (undated) DEVICE — ISLAND DRESSING 4IN X 10IN: Brand: SILVERLON ANTIMICROBIAL WOUND DRESSING

## (undated) DEVICE — SOLUTION IRRIG 1000ML 0.9% NACL USP BTL

## (undated) DEVICE — PIN STNMN 3.2MMX9IN FOR COMPHSVE REV

## (undated) DEVICE — 3M™ TEGADERM™ TRANSPARENT FILM DRESSING FRAME STYLE, 1626W, 4 IN X 4-3/4 IN (10 CM X 12 CM), 50/CT 4CT/CASE: Brand: 3M™ TEGADERM™

## (undated) DEVICE — 3M™ STERI-STRIP™ REINFORCED ADHESIVE SKIN CLOSURES, R1547, 1/2 IN X 4 IN (12 MM X 100 MM), 6 STRIPS/ENVELOPE: Brand: 3M™ STERI-STRIP™

## (undated) DEVICE — INTENT TO BE USED WITH SUTURE MATERIAL FOR TISSUE CLOSURE: Brand: RICHARD-ALLAN® NEEDLE 3/8 CIRCLE TROCAR

## (undated) DEVICE — COVER,MAYO STAND,STERILE: Brand: MEDLINE

## (undated) DEVICE — SUT ETHBND XL 0 18IN NABSRB GRN CR 48MM CTX 1/2 CIR

## (undated) DEVICE — 3M™ STERI-STRIP™ COMPOUND BENZOIN TINCTURE 40 BAGS/CARTON 4 CARTONS/CASE C1544: Brand: 3M™ STERI-STRIP™

## (undated) DEVICE — HOOD: Brand: FLYTE

## (undated) DEVICE — Device

## (undated) DEVICE — SLING ORTH L16.5IN D7IN M DK BLU POLY COT ARM

## (undated) DEVICE — ISLAND DRESSING 4IN X 11IN: Brand: SILVERLON ANTIMICROBIAL WOUND DRESSING

## (undated) DEVICE — APPLICATOR,COTTON-TIP,WOOD,3,STRL: Brand: MEDLINE

## (undated) DEVICE — ENCORE® LATEX MICRO SIZE 8, STERILE LATEX POWDER-FREE SURGICAL GLOVE: Brand: ENCORE

## (undated) DEVICE — TOWEL,OR,DSP,ST,BLUE,DLX,2/PK,40PK/CS: Brand: MEDLINE

## (undated) NOTE — MR AVS SNAPSHOT
[address] [facility] [address] [address]  [phone]  [phone]               Thank you for choosing us for your health care visit with Siri Jolley PTA.   We are glad to serve you and happy to provide you with

## (undated) NOTE — MR AVS SNAPSHOT
[address] [facility] [address] [address]  [phone]  [phone]               Thank you for choosing us for your health care visit with Yang Lin PTA.   We are glad to serve you and happy to provide you with for a complete breast exam.  Do NOT use deodorant, talcum powder, lotions, or creams on your breasts or underarms.  They leave a coating that may be picked up by the x-rays, thereby distorting the mammogram.  Wear a two piece outfit the day of the exam. Sanjivsa April

## (undated) NOTE — LETTER
[facility], [address] [facility]   Date:   9/17/2021     Name:   Cassi Che    YOB: 1945   MRN:   [id number]       WHERE IS YOUR PAIN NOW?   Buddy Laboy the areas on your body where you feel the describe

## (undated) NOTE — MR AVS SNAPSHOT
[address] [facility] 43 [address]  [phone]  [phone]               Thank you for choosing us for your health care visit with Siomara Anguiano PTA.   We are glad to serve you and happy to provide you with [address] [place] ([facility])    [address] [address]   [phone]           Please arrive at your scheduled appointment time. Wear comfortable, loose fitting clothing.             Apr 11, 201

## (undated) NOTE — LETTER
Notifier: Stereotypes       Patient Name: Sera Dalton       Identification Number: [id number]                          Advance Beneficiary Notice of Noncoverage (ABN)   NOTE:  If Medicare doesn’t pay for D. Items/service(s) below, you may have to pay.  Medicare does not pay for everything, even some care that you or your health care provider have good reason to think you need. We expect Medicare may not pay for the D. items/service(s) below.  Items or Services Reason Medicare May Not Pay: Estimated Cost   __EKG ($129.00)  __Pap smear ($48.23) __Pelvic/Breast ($65.00)  __ Ear Irrigation ($149)  __ Injection(s)  ___ Tdap ($70)       ___ Meningitis ($206)   __Prevnar ($285)  ___ Td ($51)            ___Shingrix ($215)        __Prevnar 20 ($309)  ___ Hep A ($156)   ___Prolia ($1827.00)     __ Xiaflex ($              )   ___ Hep B ($167)      __Pneumovax ($155)                                            ___ Vaccine Administration ($31)   __ Medicare does not cover this service      __ Medicare may not pay for this   item/service for your condition     __ Medicare may not pay for this item/service as often as this        WHAT YOU NEED TO DO NOW:  Read this notice, so you can make an informed decision about your care.  Ask us any questions that you may have after you finish reading.  Choose an option below about whether to receive the D. item/service(s)  listed above.  Note: If you choose Option 1 or 2, we may help you to use any other insurance that you might have, but Medicare cannot require us to do this.  OPTIONS: Check only one box.  We cannot choose a box for you.   OPTION 1. I want the D. item/service(s) listed above. You may ask to be paid now, but I also want Medicare billed for an official decision on payment, which is sent to me on a Medicare Summary Notice (MSN). I understand that if Medicare doesn’t pay, I am responsible for payment, but I can appeal to Medicare by following the directions on the  MSN. If Medicare does pay, you will refund any payments I made to you, less co-pays or deductibles.  OPTION 2. I want the D. item/service(s) listed above, but do not bill Medicare. You may ask to be paid now as I am responsible for payment. I cannot appeal if Medicare is not billed.  OPTION 3. I don't want the D. item/service(s) listed above. I understand with this choice I am not responsible for payment, and I cannot appeal to see if Medicare would pay.    H. Additional Information:    This notice gives our opinion, not an official Medicare decision. If you have other questions on this notice or Medicare billing, call 1-800-MEDICARE ([phone]/TTY: 1-690.800.6917). Signing below means that you have received and understand this notice. You also receive a copy.  Signature: Date:       You have the right to get Medicare information in an accessible format, like large print, Braille, or audio. You also have the right to file a complaint if you feel you’ve been discriminated against. Visit Medicare.gov/about- us/syhlsazhrxvhn-xrftngklqloxzhzzi-qzwzzv.  According to the Paperwork Reduction Act of 1995, no persons are required to respond to a collection of information unless it displays a valid OMB control number. The valid OMB control number for this information collection is 6015-4605. The time required to complete this information collection is estimated to average 7 minutes per response, including the time to review instructions, search existing data resources, gather the data needed, and complete and review the information collection. If you have comments concerning the accuracy of the time estimate or suggestions for improving this form, please write to: CMS, [facility] Security     Nam, Attn: [employer] Reports Clearance Officer, [place], [place] [address].  Form CMS-R-131 (Exp. 1/31/2026) Form Approved OMB No. [id number]

## (undated) NOTE — LETTER
1/15/2025          Anna Dalton    [address] [address]         Dear Anna,    Our records indicate that the tests ordered for you by MARGARET Martin  have not been done.  If you have, in fact, already completed the tests or you do not wish to have the tests done, please contact our office at THE NUMBER LISTED BELOW.  Otherwise, please proceed with the testing.  Enclosed is a duplicate order for your convenience.    Labs Order:  C. diff toxigenic PCR (OPT)  Stool Culture W/ Shigatoxin        Sincerely,    MARGARET Martin  [address]  [facility] [address] [phone]

## (undated) NOTE — LETTER
[facility] [address] , [place]   Date:   10/17/2023     Name:   Severiano Moder    YOB: 1945   MRN:   [id number]       [facility] Jazmine the areas on your body where you feel the described sensations. Use the appropriate symbol. Gilda Malin the areas of radiation. Include all affected areas. Just to complete the picture, please draw in the face. ACHE:  ^ ^ ^   NUMBNESS:  0000   PINS & NEEDLES:  = = = =                              ^ ^ ^                       0000              = = = =                                    ^ ^ ^                       0000            = = = =      BURNING:  XXXX   STABBING: ////                  XXXX                ////                         XXXX          ////     Please jazmine the line below indicating your degree of pain right now  with 0 being no pain 10 being the worst pain possible.                                          0             1             2              3             4              5              6              7             8             9             10         Patient Signature:

## (undated) NOTE — LETTER
[address]   Date:   11/5/2019     Name:   Donna June    YOB: 1945   MRN:   [id number]       WHERE IS YOUR PAIN NOW? Ernie the areas on your body where you feel the described sensations.   Use the appropriate sym

## (undated) NOTE — LETTER
AUTHORIZATION FOR SURGICAL OPERATION OR OTHER PROCEDURE    1. I hereby authorize Dr. Bonilla/JOSHUA Atkins , and [facility] staff assigned to my case to perform the following operation and/or procedure at the [facility] site:    Cortisone injection in Left shoulder   _______________________________________________________________________________________________      _______________________________________________________________________________________________    2.  My physician has explained the nature and purpose of the operation or other procedure, possible alternative methods of treatment, the risks involved, and the possibility of complication to me.  I acknowledge that no guarantee has been made as to the result that may be obtained.  3.  I recognize that, during the course of this operation, or other procedure, unforseen conditions may necessitate additional or different procedure than those listed above.  I, therefore, further authorize and request that the above named physician, his/her physician assistants or designees perform such procedures as are, in his/her professional opinion, necessary and desirable.  4.  Any tissue or organs removed in the operation or other procedure may be disposed of by and at the discretion of the [facility] and [facility].  5.  I understand that in the event of a medical emergency, I will be transported by local paramedics to [facility] or other hospital emergency department.  6.  I certify that I have read and fully understand the above consent to operation and/or other procedure.    7.  I acknowledge that my physician has explained sedation/analgesia administration to me including the risks and benefits.  I consent to the administration of sedation/analgesia as may be necessary or desirable in the judgement of my physician.    Witness signature: ___________________________________________________ Date:   ______/______/_____                    Time:  ________ A.M.  P.M.       Patient Name:  ______________________________________________________  (please print)      Patient signature:  ___________________________________________________             Relationship to Patient:           []  Parent    Responsible person                          []  Spouse  In case of minor or                    [] Other  _____________   Incompetent name:  __________________________________________________                               (please print)      _____________      Responsible person  In case of minor or  Incompetent signature:  _______________________________________________    Statement of Physician  My signature below affirms that prior to the time of the procedure, I have explained to the patient and/or his/her guardian, the risks and benefits involved in the proposed treatment and any reasonable alternative to the proposed treatment.  I have also explained the risks and benefits involved in the refusal of the proposed treatment and have answered the patient's questions.                        Date:  ______/______/_______  Provider                      Signature:  __________________________________________________________       Time:  ___________ A.M    P.M.

## (undated) NOTE — LETTER
AUTHORIZATION FOR SURGICAL OPERATION OR OTHER PROCEDURE    1. I hereby authorize Dr. Irwin Parada and the [facility] staff assigned to my case to perform the following operation and/or procedure at the [facility]:    Right glenohumeral joint steroid injection under US guidance     2. My physician has explained the nature and purpose of the operation or other procedure, possible alternative methods of treatment, the risks involved, and the possibility of complication to me. I acknowledge that no guarantee has been made as to the result that may be obtained. 3.  I recognize that, during the course of this operation, or other procedure, unforseen conditions may necessitate additional or different procedure than those listed above. I, therefore, further authorize and request that the above named physician, his/her physician assistants or designees perform such procedures as are, in his/her professional opinion, necessary and desirable. 4.  Any tissue or organs removed in the operation or other procedure may be disposed of by and at the discretion of the [facility] staff and [facility]. 5.  I understand that in the event of a medical emergency, I will be transported by local paramedics to [facility] or other hospital emergency department. 6.  I certify that I have read and fully understand the above consent to operation and/or other procedure. 7.  I acknowledge that my physician has explained sedation/analgesia administration to me including the risks and benefits. I consent to the administration of sedation/analgesia as may be necessary or desirable in the judgement of my physician. Witness signature: ___________________________________________________ Date:  ______/______/_____                    Time:  ________ A. M.  P.M.        Patient Name:  Yakov Ahumada  4/22/1945  [id number]         Patient signature: ___________________________________________________               Statement of Physician  My signature below affirms that prior to the time of the procedure, I have explained to the patient and/or his/her guardian, the risks and benefits involved in the proposed treatment and any reasonable alternative to the proposed treatment. I have also explained the risks and benefits involved in the refusal of the proposed treatment and have answered the patient's questions.                         Date:  ______/______/_______  Provider                      Signature:  __________________________________________________________       Time:  ___________ ACAESAR VIVAS

## (undated) NOTE — LETTER
[address] [facility]  [address], [address]  [address] [place] [address] [phone]

## (undated) NOTE — LETTER
[address]   Date:   8/30/2021     Name:   Aroldo Dumont    YOB: 1945   MRN:   [id number]       WHERE IS YOUR PAIN NOW? Ernie the areas on your body where you feel the described sensations.   Use the appropriate sym

## (undated) NOTE — MR AVS SNAPSHOT
Emery Ellison [address] [facility] [address] [address]  [phone]  [phone]               Thank you for choosing us for your health care visit with Jose Ferro PTA.   We are glad to serve you and happy to provide you with [phone]           Please arrive at your scheduled appointment time. Wear comfortable, loose fitting clothing.             Apr 20, 2017 10:00 AM   [place] [facility] Visit By Therapist with Rosalva Llanes, [address] (

## (undated) NOTE — LETTER
[address]   Date:   1/5/2021     Name:   Cassi Che    YOB: 1945   MRN:   [id number]       WHERE IS YOUR PAIN NOW? Ernie the areas on your body where you feel the described sensations.   Use the appropriate symb

## (undated) NOTE — LETTER
AUTHORIZATION FOR SURGICAL OPERATION OR OTHER PROCEDURE    1. I hereby authorize Dr. Sher Loo and the [facility] staff assigned to my case to perform the following operation and/or procedure at the [facility]:    Left Glenohumeral joint Synvisc 1 injection under ultrasound guidance.     2.  My physician has explained the nature and purpose of the operation or other procedure, possible alternative methods of treatment, the risks involved, and the possibility of complication to me.  I acknowledge that no guarantee has been made as to the result that may be obtained.  3.  I recognize that, during the course of this operation, or other procedure, unforseen conditions may necessitate additional or different procedure than those listed above.  I, therefore, further authorize and request that the above named physician, his/her physician assistants or designees perform such procedures as are, in his/her professional opinion, necessary and desirable.  4.  Any tissue or organs removed in the operation or other procedure may be disposed of by and at the discretion of the [facility] staff and [facility].  5.  I understand that in the event of a medical emergency, I will be transported by local paramedics to [facility] or other hospital emergency department.  6.  I certify that I have read and fully understand the above consent to operation and/or other procedure.    7.  I acknowledge that my physician has explained sedation/analgesia administration to me including the risks and benefits.  I consent to the administration of sedation/analgesia as may be necessary or desirable in the judgement of my physician.    Witness signature: ___________________________________________________ Date:  ______/______/_____                    Time:  ________ A.M.  P.M.       Patient Name:Anna Dalton  4/22/1945  [id number]         Patient signature:   ___________________________________________________        Statement of Physician  My signature below affirms that prior to the time of the procedure, I have explained to the patient and/or his/her guardian, the risks and benefits involved in the proposed treatment and any reasonable alternative to the proposed treatment.  I have also explained the risks and benefits involved in the refusal of the proposed treatment and have answered the patient's questions.                        Date:  ______/______/_______  Provider                      Signature:  __________________________________________________________       Time:  ___________ ACAESAR VIVAS

## (undated) NOTE — LETTER
Date: 2022      Patient Name: Modesto Stahl      : 1945        Thank you for choosing [facility] [address] as your health care provider. Your physician has deemed the following medical service(s) necessary. However, your insurance plan may not pay for all of your health care and costs and may deny payment for this service. The fact that your insurance plan does not pay for an item or service does not mean you should not receive it. The purpose of this form is to help you make an informed decision about whether or not you want to receive this service(s) that may not be paid for by your insurance plan. CPT Code Description     Cost     _________ Left glenohumeral steroid injection under US guidance  _____________      _________ ______________________________ _____________      _________ ______________________________ _____________      I understand that the above mentioned service(s) or supply may not be covered by my insurance company.  I agree to be financially responsible for the cost of this service or supply in the event of my insurance denies payment as a non-covered benefit.        ______________________________________________________________________  Signature of Patient or Patient's Representative  Relationship  Date    ______________________________________________________________________  Signature of Witness to signing of form   Printed Name

## (undated) NOTE — LETTER
[address]   Date:   8/17/2021     Name:   Cassi Che    YOB: 1945   MRN:   [id number]       WHERE IS YOUR PAIN NOW? Ernie the areas on your body where you feel the described sensations.   Use the appropriate sym

## (undated) NOTE — LETTER
[address], [facility]   Date:   7/14/2022     Name:   David Go    YOB: 1945   MRN:   [id number]       WHERE IS YOUR PAIN NOW? Jazmine the areas on your body where you feel the described sensations. Use the appropriate symbol. Jason Angeles the areas of radiation. Include all affected areas. Just to complete the picture, please draw in the face. ACHE:  ^ ^ ^   NUMBNESS:  0000   PINS & NEEDLES:  = = = =                              ^ ^ ^                       0000              = = = =                                    ^ ^ ^                       0000            = = = =      BURNING:  XXXX   STABBING: ////                  XXXX                ////                         XXXX          ////     Please jazmine the line below indicating your degree of pain right now  with 0 being no pain 10 being the worst pain possible.                                          0             1             2              3             4              5              6              7             8             9             10         Patient Signature:

## (undated) NOTE — MR AVS SNAPSHOT
Emery Ell[address]  [facility] [address]  [phone]               Thank you for choosing us for your health care visit with Gauri Mohr PT.   We are glad to serve you and happy to provide you with t [facility] ([address] [place] [address])    [address] [address]   [phone]           Please arrive at your scheduled appointment time. Wear comfortable, loose fitting clothing.             Mar 21, 201

## (undated) NOTE — MR AVS SNAPSHOT
Emery Ellison [address]  [facility] [address]  [phone]               Thank you for choosing us for your health care visit with Ximena Du PT.   We are glad to serve you and happy to provide you with t [phone]           Please arrive at your scheduled appointment time. Wear comfortable, loose fitting clothing.             May 17, 2017  1:00 PM   POSTOP with Bonnie Heredia MD   [address] [place] [address] (DM AT [place])    [address]

## (undated) NOTE — LETTER
[facility] [address] , [place]   Date:   4/4/2023     Name:   Gary Esparza    YOB: 1945   MRN:   [id number]       WHERE IS YOUR PAIN NOW? Jazmine the areas on your body where you feel the described sensations. Use the appropriate symbol. Zeke Perea the areas of radiation. Include all affected areas. Just to complete the picture, please draw in the face. ACHE:  ^ ^ ^   NUMBNESS:  0000   PINS & NEEDLES:  = = = =                              ^ ^ ^                       0000              = = = =                                    ^ ^ ^                       0000            = = = =      BURNING:  XXXX   STABBING: ////                  XXXX                ////                         XXXX          ////     Please jazmine the line below indicating your degree of pain right now  with 0 being no pain 10 being the worst pain possible.                                          0             1             2              3             4              5              6              7             8             9             10         Patient Signature:

## (undated) NOTE — LETTER
AUTHORIZATION FOR SURGICAL OPERATION OR OTHER PROCEDURE    1. I hereby authorize Dr. Luisito Soria and the [facility] staff assigned to my case to perform the following operation and/or procedure at the [facility]:    Left glenohumeral steroid injection under US guidance      2. My physician has explained the nature and purpose of the operation or other procedure, possible alternative methods of treatment, the risks involved, and the possibility of complication to me. I acknowledge that no guarantee has been made as to the result that may be obtained. 3.  I recognize that, during the course of this operation, or other procedure, unforseen conditions may necessitate additional or different procedure than those listed above. I, therefore, further authorize and request that the above named physician, his/her physician assistants or designees perform such procedures as are, in his/her professional opinion, necessary and desirable. 4.  Any tissue or organs removed in the operation or other procedure may be disposed of by and at the discretion of the [facility] staff and [facility]. 5.  I understand that in the event of a medical emergency, I will be transported by local paramedics to [facility] or other hospital emergency department. 6.  I certify that I have read and fully understand the above consent to operation and/or other procedure. 7.  I acknowledge that my physician has explained sedation/analgesia administration to me including the risks and benefits. I consent to the administration of sedation/analgesia as may be necessary or desirable in the judgement of my physician. Witness signature: ___________________________________________________ Date:  ______/______/_____                    Time:  ________ A. M.  P.M.        Patient Name:  Fabián Coe  4/22/1945  [id number]         Patient signature: ___________________________________________________                 Statement of Physician  My signature below affirms that prior to the time of the procedure, I have explained to the patient and/or his/her guardian, the risks and benefits involved in the proposed treatment and any reasonable alternative to the proposed treatment. I have also explained the risks and benefits involved in the refusal of the proposed treatment and have answered the patient's questions.                         Date:  ______/______/_______  Provider                      Signature:  __________________________________________________________       Time:  ___________ ACAESAR VIVAS

## (undated) NOTE — LETTER
AUTHORIZATION FOR SURGICAL OPERATION OR OTHER PROCEDURE    1. I hereby authorize Dr. Bonilla, and [facility] staff assigned to my case to perform the following operation and/or procedure at the [facility] site:    _______________________________________________________________________________________________    Left shoulder cortisone injection  _______________________________________________________________________________________________    2.  My physician has explained the nature and purpose of the operation or other procedure, possible alternative methods of treatment, the risks involved, and the possibility of complication to me.  I acknowledge that no guarantee has been made as to the result that may be obtained.  3.  I recognize that, during the course of this operation, or other procedure, unforseen conditions may necessitate additional or different procedure than those listed above.  I, therefore, further authorize and request that the above named physician, his/her physician assistants or designees perform such procedures as are, in his/her professional opinion, necessary and desirable.  4.  Any tissue or organs removed in the operation or other procedure may be disposed of by and at the discretion of the [facility] and [facility].  5.  I understand that in the event of a medical emergency, I will be transported by local paramedics to [facility] or other hospital emergency department.  6.  I certify that I have read and fully understand the above consent to operation and/or other procedure.    7.  I acknowledge that my physician has explained sedation/analgesia administration to me including the risks and benefits.  I consent to the administration of sedation/analgesia as may be necessary or desirable in the judgement of my physician.    Witness signature: ___________________________________________________ Date:   ______/______/_____                    Time:  ________ A.M.  P.M.       Patient Name:  ______________________________________________________  (please print)      Patient signature:  ___________________________________________________             Relationship to Patient:           []  Parent    Responsible person                          []  Spouse  In case of minor or                    [] Other  _____________   Incompetent name:  __________________________________________________                               (please print)      _____________      Responsible person  In case of minor or  Incompetent signature:  _______________________________________________    Statement of Physician  My signature below affirms that prior to the time of the procedure, I have explained to the patient and/or his/her guardian, the risks and benefits involved in the proposed treatment and any reasonable alternative to the proposed treatment.  I have also explained the risks and benefits involved in the refusal of the proposed treatment and have answered the patient's questions.                        Date:  ______/______/_______  Provider                      Signature:  __________________________________________________________       Time:  ___________ A.M    P.M.

## (undated) NOTE — LETTER
[address], [facility]   Date:   9/15/2022     Name:   Charisse Nava    YOB: 1945   MRN:   [id number]       WHERE IS YOUR PAIN NOW? Jazmine the areas on your body where you feel the described sensations. Use the appropriate symbol. Sofia Danker the areas of radiation. Include all affected areas. Just to complete the picture, please draw in the face. ACHE:  ^ ^ ^   NUMBNESS:  0000   PINS & NEEDLES:  = = = =                              ^ ^ ^                       0000              = = = =                                    ^ ^ ^                       0000            = = = =      BURNING:  XXXX   STABBING: ////                  XXXX                ////                         XXXX          ////     Please jazmine the line below indicating your degree of pain right now  with 0 being no pain 10 being the worst pain possible.                                          0             1             2              3             4              5              6              7             8             9             10         Patient Signature:

## (undated) NOTE — LETTER
[address] [facility]   Date:   6/14/2022     Name:   Gary Esparza    YOB: 1945   MRN:   [id number]       WHERE IS YOUR PAIN NOW? Jazmine the areas on your body where you feel the described sensations. Use the appropriate symbol. Zeke Perea the areas of radiation. Include all affected areas. Just to complete the picture, please draw in the face. ACHE:  ^ ^ ^   NUMBNESS:  0000   PINS & NEEDLES:  = = = =                              ^ ^ ^                       0000              = = = =                                    ^ ^ ^                       0000            = = = =      BURNING:  XXXX   STABBING: ////                  XXXX                ////                         XXXX          ////     Please jazmine the line below indicating your degree of pain right now  with 0 being no pain 10 being the worst pain possible.                                          0             1             2              3             4              5              6              7             8             9             10         Patient Signature:

## (undated) NOTE — MR AVS SNAPSHOT
[address] Ell[address] [facility] [address] [address]  [phone]  [phone]               Thank you for choosing us for your health care visit with Wilfrido Joe PTA.   We are glad to serve you and happy to provide you with loose fitting clothing. Mar 27, 2017 11:30 AM   [place] [facility] Visit By Therapist with Patty Tucker, ELEUTERIO   [address] [facility] ([facility])    [address] [address]   [phone]

## (undated) NOTE — LETTER
[address], [facility]   Date:   10/18/2022     Name:   Ramone Kasper    YOB: 1945   MRN:   [id number]       WHERE IS YOUR PAIN NOW? Jazmine the areas on your body where you feel the described sensations. Use the appropriate symbol. Leodis Mathieu the areas of radiation. Include all affected areas. Just to complete the picture, please draw in the face. ACHE:  ^ ^ ^   NUMBNESS:  0000   PINS & NEEDLES:  = = = =                              ^ ^ ^                       0000              = = = =                                    ^ ^ ^                       0000            = = = =      BURNING:  XXXX   STABBING: ////                  XXXX                ////                         XXXX          ////     Please jazmine the line below indicating your degree of pain right now  with 0 being no pain 10 being the worst pain possible.                                          0             1             2              3             4              5              6              7             8             9             10         Patient Signature:

## (undated) NOTE — LETTER
[facility] [address] , [place]   Date:   7/10/2023     Name:   Modesto Stahl    YOB: 1945   MRN:   [id number]       WHERE IS YOUR PAIN NOW? Jazmine the areas on your body where you feel the described sensations. Use the appropriate symbol. Lima Stewart the areas of radiation. Include all affected areas. Just to complete the picture, please draw in the face. ACHE:  ^ ^ ^   NUMBNESS:  0000   PINS & NEEDLES:  = = = =                              ^ ^ ^                       0000              = = = =                                    ^ ^ ^                       0000            = = = =      BURNING:  XXXX   STABBING: ////                  XXXX                ////                         XXXX          ////     Please jazmine the line below indicating your degree of pain right now  with 0 being no pain 10 being the worst pain possible.                                          0             1             2              3             4              5              6              7             8             9             10         Patient Signature:

## (undated) NOTE — MR AVS SNAPSHOT
Emery Ellison 12 [facility] [address] [address]  [phone]  [phone]               Thank you for choosing us for your health care visit with Ro Reed PTA.   We are glad to serve you and happy to provide you with [phone]           Please arrive at your scheduled appointment time. Wear comfortable, loose fitting clothing.             Apr 26, 2017 11:15 AM   [place] [facility] Visit By Therapist with Perla De Oliveira, [address] [place] [address] (E

## (undated) NOTE — LETTER
AUTHORIZATION FOR SURGICAL OPERATION OR OTHER PROCEDURE    1. I hereby authorize Dr. Spring Dunham and the [facility] staff assigned to my case to perform the following operation and/or procedure at the [facility]:    Left glenohumeral joint, right subacromial bursa steroid injections under US guidance      2. My physician has explained the nature and purpose of the operation or other procedure, possible alternative methods of treatment, the risks involved, and the possibility of complication to me. I acknowledge that no guarantee has been made as to the result that may be obtained. 3.  I recognize that, during the course of this operation, or other procedure, unforseen conditions may necessitate additional or different procedure than those listed above. I, therefore, further authorize and request that the above named physician, his/her physician assistants or designees perform such procedures as are, in his/her professional opinion, necessary and desirable. 4.  Any tissue or organs removed in the operation or other procedure may be disposed of by and at the discretion of the [facility] staff and [facility]. 5.  I understand that in the event of a medical emergency, I will be transported by local paramedics to [facility] or other hospital emergency department. 6.  I certify that I have read and fully understand the above consent to operation and/or other procedure. 7.  I acknowledge that my physician has explained sedation/analgesia administration to me including the risks and benefits. I consent to the administration of sedation/analgesia as may be necessary or desirable in the judgement of my physician. Witness signature: ___________________________________________________ Date:  ______/______/_____                    Time:  ________ A. M.  P.M.        Patient Name:  Amber Montenegro  4/22/1945  [id number]         Patient signature: ___________________________________________________        Statement of Physician  My signature below affirms that prior to the time of the procedure, I have explained to the patient and/or his/her guardian, the risks and benefits involved in the proposed treatment and any reasonable alternative to the proposed treatment. I have also explained the risks and benefits involved in the refusal of the proposed treatment and have answered the patient's questions.                         Date:  ______/______/_______  Provider                      Signature:  __________________________________________________________       Time:  ___________ ACAESAR VIVAS

## (undated) NOTE — MR AVS SNAPSHOT
Emery Ell[address] [facility] [address] [address]  [phone]               Thank you for choosing us for your health care visit with Siomara Anguiano PTA.   We are glad to serve you and happy to provide you with Apr 26, 2017 11:15 AM   [place] [facility] Visit By Therapist with Ajay Castellanos, [address] ([address])    [address] [address]   [phone]           Please arrive at your

## (undated) NOTE — MR AVS SNAPSHOT
Emery Ellison 12 [facility] [address] [address]  [phone]  [phone]               Thank you for choosing us for your health care visit with Diane Bai PTA.   We are glad to serve you and happy to provide you with

## (undated) NOTE — MR AVS SNAPSHOT
Emery Ellison 12 [facility] 43 [address]  [phone]  [phone]               Thank you for choosing us for your health care visit with Tej Mittal PTA.   We are glad to serve you and happy to provide you with [phone]           Mammography does not detect all breast cancer.  The American Cancer Society recommends a physical breast examination be performed annually by a healthcare professional.  It is necessary that you schedule an appointment with your phys

## (undated) NOTE — LETTER
CHARIS. Notifier: [employer]       GARY. Patient Name: Sera Dalton Identification Number: [id number]                          Advance Beneficiary Notice of Noncoverage (ABN)   NOTE:  If Medicare doesn’t pay for D. item/service(s) below, you may have to pay.  Medicare does not pay for everything, even some care that you or your health care provider have good reason to think you need. We expect Medicare may not pay for the D. item/service(s) below.  D. Items or Services  Left Glenohumeral joint Synvisc 1 injection under ultrasound guidance.    E. Reason Medicare May Not Pay: F. Estimated Cost   __ EKG ($87.00)  __ Pap smear ($101) __Pelvic/Breast ($147.00)  __ Ear Irrigation ($138)  _x_ Injection(s)  ___ Tdap ($181)       ___ Meningitis ($290)   __Prevnar ($555)  ___ Td ($66)              ___ Prevnar 20 ($549)  ___ Hep A ($152)     ___ Prolia ($1827)         __ Xiaflex ($            )   ___ Hep B ($150)     ___ Pneumovax ($287)                                         ___ Vaccine Administration ($65)   __ Medicare does not cover this service      __ Medicare may not pay for this   item/service for your condition     __ Medicare may not pay for this item/service as often as this        WHAT YOU NEED TO DO NOW:  Read this notice, so you can make an informed decision about your care.  Ask us any questions that you may have after you finish reading.  Choose an option below about whether to receive the D. item/service(s) listed above.  Note: If you choose Option 1 or 2, we may help you to use any other insurance that you might have, but Medicare cannot require us to do this.  G. OPTIONS: Check only one box.  We cannot choose a box for you.   OPTION 1. I want the D. item/service(s) listed above. You may ask to be paid now, but I also want Medicare billed for an official decision on payment, which is sent to me on a Medicare Summary Notice (MSN). I understand that if Medicare doesn’t pay, I am responsible for  payment, but I can appeal to Medicare by following the directions on the MSN. If Medicare does pay, you will refund any payments I made to you, less co-pays or deductibles.  OPTION 2. I want the D. item/service(s) listed above, but do not bill Medicare. You may ask to be paid now as I am responsible for payment. I cannot appeal if Medicare is not billed.  OPTION 3. I don't want the D. item/service(s) listed above. I understand with this choice I am not responsible for payment, and I cannot appeal to see if Medicare would pay.    H. Additional Information:    This notice gives our opinion, not an official Medicare decision. If you have other questions on this notice or Medicare billing, call 1-800-MEDICARE (1-883.584.6849/TTY: 1-780.699.6261). Signing below means that you have received and understand this notice. You also receive a copy.  I. Signature: J. Date:       You have the right to get Medicare information in an accessible format, like large print, Braille, or audio. You also have the right to file a complaint if you feel you’ve been discriminated against. Visit Medicare.gov/about- us/lylhbeepmlodj-cydoatlvesygqfcar-ttldcr.  According to the Paperwork Reduction Act of 1995, no persons are required to respond to a collection of information unless it displays a valid OMB control number. The valid OMB control number for this information collection is 2442-4513. The time required to complete this information collection is estimated to average 7 minutes per response, including the time to review instructions, search existing data resources, gather the data needed, and complete and review the information collection. If you have comments concerning the accuracy of the time estimate or suggestions for improving this form, please write to: CMS, [facility] Security     [place], Attn: [employer] Reports Clearance Officer, [place], [place] [address].  Form CMS-R-131 (Exp. 1/31/2026) Form Approved OMB No. [id number]

## (undated) NOTE — MR AVS SNAPSHOT
Emery Ellison [address]  [facility] [address]  [phone]               Thank you for choosing us for your health care visit with Denzel Davis PT.   We are glad to serve you and happy to provide you with t [address] [facility] ([address] [place] [address])    [address] [address]   [phone]           Please arrive at your scheduled appointment time. Wear comfortable, loose fitting clothing.             Mar 23, 201

## (undated) NOTE — MR AVS SNAPSHOT
Emery Ellison [address]  [facility] [address]  [phone]               Thank you for choosing us for your health care visit with Carissa Whitlock, PT.   We are glad to serve you and happy to provide you with t [facility] Visit By Therapist with Cristian Bhagat, ELEUTERIO   [address] [facility] ([address] [place] [address])    [address]   [phone]           Please arrive at your scheduled appointment t

## (undated) NOTE — LETTER
AURORA Notifier: [employer]       GARY. Patient Name: Sera Dalton Identification Number: [id number]                          Advance Beneficiary Notice of Noncoverage (ABN)   NOTE:  If Medicare doesn’t pay for D. item/service(s) below, you may have to pay.  Medicare does not pay for everything, even some care that you or your health care provider have good reason to think you need. We expect Medicare may not pay for the D. item/service(s) below.  D. Items or Services  Left quadratus lumborum and lumbar paraspinal trigger point injections  E. Reason Medicare May Not Pay: F. Estimated Cost   __ EKG ($87.00)  __ Pap smear ($101) __Pelvic/Breast ($147.00)  __ Ear Irrigation ($138)  _x_ Injection(s)  ___ Tdap ($181)       ___ Meningitis ($290)   __Prevnar ($555)  ___ Td ($66)              ___ Prevnar 20 ($549)  ___ Hep A ($152)     ___ Prolia ($1827)         __ Xiaflex ($            )   ___ Hep B ($150)     ___ Pneumovax ($287)                                         ___ Vaccine Administration ($65)   __ Medicare does not cover this service      __ Medicare may not pay for this   item/service for your condition     __ Medicare may not pay for this item/service as often as this        WHAT YOU NEED TO DO NOW:  Read this notice, so you can make an informed decision about your care.  Ask us any questions that you may have after you finish reading.  Choose an option below about whether to receive the D. item/service(s) listed above.  Note: If you choose Option 1 or 2, we may help you to use any other insurance that you might have, but Medicare cannot require us to do this.  G. OPTIONS: Check only one box.  We cannot choose a box for you.   OPTION 1. I want the D. item/service(s) listed above. You may ask to be paid now, but I also want Medicare billed for an official decision on payment, which is sent to me on a Medicare Summary Notice (MSN). I understand that if Medicare doesn’t pay, I am responsible for  payment, but I can appeal to Medicare by following the directions on the MSN. If Medicare does pay, you will refund any payments I made to you, less co-pays or deductibles.  OPTION 2. I want the D. item/service(s) listed above, but do not bill Medicare. You may ask to be paid now as I am responsible for payment. I cannot appeal if Medicare is not billed.  OPTION 3. I don't want the D. item/service(s) listed above. I understand with this choice I am not responsible for payment, and I cannot appeal to see if Medicare would pay.    H. Additional Information:    This notice gives our opinion, not an official Medicare decision. If you have other questions on this notice or Medicare billing, call 1-800-MEDICARE (1-449.653.3865/TTY: 1-305.568.7627). Signing below means that you have received and understand this notice. You also receive a copy.  I. Signature: J. Date:       You have the right to get Medicare information in an accessible format, like large print, Braille, or audio. You also have the right to file a complaint if you feel you’ve been discriminated against. Visit Medicare.gov/about- us/zvmoarkappxrp-infyadvkjcyapcftx-xxvqqg.  According to the Paperwork Reduction Act of 1995, no persons are required to respond to a collection of information unless it displays a valid OMB control number. The valid OMB control number for this information collection is [id number]. The time required to complete this information collection is estimated to average 7 minutes per response, including the time to review instructions, search existing data resources, gather the data needed, and complete and review the information collection. If you have comments concerning the accuracy of the time estimate or suggestions for improving this form, please write to: CMS, [facility] Security     [place], Attn: [employer] Reports Clearance Officer, [place], [place] [address].  Form CMS-R-131 (Exp. 1/31/2026) Form Approved OMB No. [id number]

## (undated) NOTE — LETTER
AUTHORIZATION FOR SURGICAL OPERATION OR OTHER PROCEDURE    1. I hereby authorize Dr. Sher Loo and the [facility] staff assigned to my case to perform the following operation and/or procedure at the [facility]:    Left quadratus lumborum and lumbar paraspinal trigger point injections     2.  My physician has explained the nature and purpose of the operation or other procedure, possible alternative methods of treatment, the risks involved, and the possibility of complication to me.  I acknowledge that no guarantee has been made as to the result that may be obtained.  3.  I recognize that, during the course of this operation, or other procedure, unforseen conditions may necessitate additional or different procedure than those listed above.  I, therefore, further authorize and request that the above named physician, his/her physician assistants or designees perform such procedures as are, in his/her professional opinion, necessary and desirable.  4.  Any tissue or organs removed in the operation or other procedure may be disposed of by and at the discretion of the [facility] staff and [facility].  5.  I understand that in the event of a medical emergency, I will be transported by local paramedics to [facility] or other hospital emergency department.  6.  I certify that I have read and fully understand the above consent to operation and/or other procedure.    7.  I acknowledge that my physician has explained sedation/analgesia administration to me including the risks and benefits.  I consent to the administration of sedation/analgesia as may be necessary or desirable in the judgement of my physician.    Witness signature: ___________________________________________________ Date:  ______/______/_____                    Time:  ________ A.M.  P.M.       Patient Name:  Anna Dalton  4/22/1945  [id number]         Patient signature:   ___________________________________________________                 Statement of Physician  My signature below affirms that prior to the time of the procedure, I have explained to the patient and/or his/her guardian, the risks and benefits involved in the proposed treatment and any reasonable alternative to the proposed treatment.  I have also explained the risks and benefits involved in the refusal of the proposed treatment and have answered the patient's questions.                        Date:  ______/______/_______  Provider                      Signature:  __________________________________________________________       Time:  ___________ ACAESAR VIVAS

## (undated) NOTE — MR AVS SNAPSHOT
Emery Ellison 12 [facility] [address] [address]  [phone]  [phone]               Thank you for choosing us for your health care visit with Court Stevens PTA.   We are glad to serve you and happy to provide you with loose fitting clothing.             Mar 31, 2017 10:45 AM   FOLLOW UP with CHIQUI Marsh   [address] [place] [address])    [address] [place] [id number] [phone]              Brenda     Call the he

## (undated) NOTE — MR AVS SNAPSHOT
Emery Ellison [address]  [facility] [address]  [phone]  [phone]               Thank you for choosing us for your health care visit with Libertad Tristan PT.   We are glad to serve you and happy to provide you with t loose fitting clothing. Mar 29, 2017 12:00 PM   [place] [facility] Visit By Therapist with Sandra Zheng, [address] [facility] ([facility])    [address]  [place]  [address]   [phone]

## (undated) NOTE — LETTER
WHERE IS YOUR PAIN NOW?  Jazmine the areas on your body where you feel the described sensations.  Use the appropriate symbol.  Jazmine the areas of radiation.  Include all affected areas.  Just to complete the picture, please draw in the face.     ACHE:  ^ ^ ^   NUMBNESS:  0000   PINS & NEEDLES:  = = = =                              ^ ^ ^                       0000              = = = =                                    ^ ^ ^                       0000            = = = =      BURNING:  XXXX   STABBING: ////                  XXXX                ////                         XXXX          ////     Please jazmine the line below indicating your degree of pain right now  with 0 being no pain 10 being the worst pain possible.                                         0             1             2              3             4              5              6              7             8             9             10         Patient Signature:

## (undated) NOTE — MR AVS SNAPSHOT
[facility]  [address], [address]  [phone]               Thank you for choosing us for your health care visit with [facility], DO.   We are glad to serve you and happy to provide you with this sum between 7:30am to 6pm and on Saturday between 8am and 1pm. Evening and weekend appointments for your exam are available. Walk-in patients are welcome for most exams.      [facility] and [address] [place] [address] Shirley acquired. Please contact the Patient Business Office at [phone] if you have any questions related to insurance coverage.                Reason for Today's Visit     Hypertension     Knee Pain           Medical Issues Discussed Today     HTN (hypertens gabapentin 300 MG Caps   TAKE ONE CAPSULE BY MOUTH TWICE DAILY   Commonly known as:  NEURONTIN           HYDROcodone-acetaminophen 5-325 MG Tabs   Take 1 tablet by mouth every 6 (six) hours as needed for Pain.    Commonly known as:  Yessica velasquezo 5.(added 3/29/17)- Pt will be able to sit to stand with no low back pain.          Results of Recent Testing       Thahart                  Visit [facility] online at  [email or web address]

## (undated) NOTE — LETTER
AUTHORIZATION FOR SURGICAL OPERATION OR OTHER PROCEDURE    1. I hereby authorize Dr. Sher Loo and the [facility] staff assigned to my case to perform the following operation and/or procedure at the [facility] Office:  Left glenohumeral joint steroid injection under US guidance   2.  My physician has explained the nature and purpose of the operation or other procedure, possible alternative methods of treatment, the risks involved, and the possibility of complication to me.  I acknowledge that no guarantee has been made as to the result that may be obtained.  3.  I recognize that, during the course of this operation, or other procedure, unforseen conditions may necessitate additional or different procedure than those listed above.  I, therefore, further authorize and request that the above named physician, his/her physician assistants or designees perform such procedures as are, in his/her professional opinion, necessary and desirable.  4.  Any tissue or organs removed in the operation or other procedure may be disposed of by and at the discretion of the [facility] staff and [facility].  5.  I understand that in the event of a medical emergency, I will be transported by local paramedics to [facility] or other hospital emergency department.  6.  I certify that I have read and fully understand the above consent to operation and/or other procedure.    7.  I acknowledge that my physician has explained sedation/analgesia administration to me including the risks and benefits.  I consent to the administration of sedation/analgesia as may be necessary or desirable in the judgement of my physician.    Witness signature: ___________________________________________________ Date:  ______/______/_____                    Time:  ________ A.M.  P.MAidan Dalton  [id number]  4/22/1945  Patient signature:  ___________________________________________________             Relationship to  Patient:           []  Parent    Responsible person                          []  Spouse  In case of minor or                    [] Other  _____________   Incompetent name:  __________________________________________________                               (please print)      _____________      Responsible person  In case of minor or  Incompetent signature:  _______________________________________________    Statement of Physician  My signature below affirms that prior to the time of the procedure, I have explained to the patient and/or his/her guardian, the risks and benefits involved in the proposed treatment and any reasonable alternative to the proposed treatment.  I have also explained the risks and benefits involved in the refusal of the proposed treatment and have answered the patient's questions.                        Date:  ______/______/_______  Provider                      Signature:  __________________________________________________________       Time:  ___________ A.M    P.M.

## (undated) NOTE — LETTER
10/6/2020    [facility] [phone]            Dear Mauricio Angeles,      Our records indicate that you are due for an appointment for a Colonoscopy with Augie Irvin MD.    Please call our office to schedule

## (undated) NOTE — MR AVS SNAPSHOT
Emery Ellison 12 [facility] [address] [address]  [phone]  [phone]               Thank you for choosing us for your health care visit with Fer Dent PTA.   We are glad to serve you and happy to provide you with [facility] ([address] [place] [address])    [address] [address]   [phone]           Please arrive at your scheduled appointment time. Wear comfortable, loose fitting clothing.             Apr 13, 201

## (undated) NOTE — LETTER
[address]   Date:   4/19/2021     Name:   Idalia Taylor    YOB: 1945   MRN:   [id number]       WHERE IS YOUR PAIN NOW? Ernie the areas on your body where you feel the described sensations.   Use the appropriate sym

## (undated) NOTE — LETTER
[address]   Date:   8/5/2019     Name:   Vj Muir    YOB: 1945   MRN:   [id number]       WHERE IS YOUR PAIN NOW? Ernie the areas on your body where you feel the described sensations.   Use the appropriate symb

## (undated) NOTE — LETTER
[facility], [place]  Authorization for Invasive Procedures  1.  I hereby authorize Dr. Elizabeth Granados , my physician and whomever may be designated as the doctor's assistant, to perform the following operation and/or procedure:  Sean 5. I consent to the photographing of the operations or procedures to be performed for the purposes of advancing medicine, science, and/or education, provided my identity is not revealed.  If the procedure has been videotaped, the physician/surgeon will obta Witness Signature: ____________________________________________ Date: __________ Time: ___________    Statement of Physician  My signature below affirms that prior to the time of the procedure, I have explained to the patient and/or her legal representativ